# Patient Record
Sex: FEMALE | Race: WHITE | NOT HISPANIC OR LATINO | Employment: FULL TIME | URBAN - METROPOLITAN AREA
[De-identification: names, ages, dates, MRNs, and addresses within clinical notes are randomized per-mention and may not be internally consistent; named-entity substitution may affect disease eponyms.]

---

## 2017-03-08 ENCOUNTER — MYC MEDICAL ADVICE (OUTPATIENT)
Dept: FAMILY MEDICINE | Facility: CLINIC | Age: 47
End: 2017-03-08

## 2017-03-08 DIAGNOSIS — T75.3XXA MOTION SICKNESS, INITIAL ENCOUNTER: Primary | ICD-10-CM

## 2017-03-08 RX ORDER — SCOLOPAMINE TRANSDERMAL SYSTEM 1 MG/1
PATCH, EXTENDED RELEASE TRANSDERMAL
Qty: 2 PATCH | Refills: 0 | Status: SHIPPED | OUTPATIENT
Start: 2017-03-08 | End: 2018-02-17

## 2017-03-08 NOTE — TELEPHONE ENCOUNTER
Please see message below, patient would like this filled at the [facility] in  [place] on [address], pharmacy loaded.   She would like as many patches as possible, if insurance doesn't cover the amount she will pay cash so a PA will not be needed.     Thanks!

## 2017-03-08 NOTE — TELEPHONE ENCOUNTER
Rx was sent. Please inform the patient that it typically takes at least 4 hours for the scopolamine patch to be effective. She may want to consider treatment with Dramamine or bonine instead.

## 2017-03-24 ENCOUNTER — OFFICE VISIT (OUTPATIENT)
Dept: FAMILY MEDICINE | Facility: CLINIC | Age: 47
End: 2017-03-24
Payer: COMMERCIAL

## 2017-03-24 VITALS
OXYGEN SATURATION: 98 % | SYSTOLIC BLOOD PRESSURE: 102 MMHG | WEIGHT: 165 LBS | BODY MASS INDEX: 26.52 KG/M2 | HEIGHT: 66 IN | TEMPERATURE: 98.5 F | HEART RATE: 80 BPM | DIASTOLIC BLOOD PRESSURE: 65 MMHG

## 2017-03-24 DIAGNOSIS — B00.1 COLD SORE: ICD-10-CM

## 2017-03-24 DIAGNOSIS — E55.9 VITAMIN D DEFICIENCY: ICD-10-CM

## 2017-03-24 DIAGNOSIS — Z01.419 ENCOUNTER FOR GYNECOLOGICAL EXAMINATION WITHOUT ABNORMAL FINDING: Primary | ICD-10-CM

## 2017-03-24 LAB
CHOLEST SERPL-MCNC: 246 MG/DL
DEPRECATED CALCIDIOL+CALCIFEROL SERPL-MC: 49 UG/L (ref 20–75)
GLUCOSE SERPL-MCNC: 95 MG/DL (ref 70–99)
HDLC SERPL-MCNC: 64 MG/DL
HGB BLD-MCNC: 13.1 G/DL (ref 11.7–15.7)
LDLC SERPL CALC-MCNC: 159 MG/DL
NONHDLC SERPL-MCNC: 182 MG/DL
TRIGL SERPL-MCNC: 117 MG/DL

## 2017-03-24 PROCEDURE — 80061 LIPID PANEL: CPT | Performed by: NURSE PRACTITIONER

## 2017-03-24 PROCEDURE — 82306 VITAMIN D 25 HYDROXY: CPT | Performed by: NURSE PRACTITIONER

## 2017-03-24 PROCEDURE — 99396 PREV VISIT EST AGE 40-64: CPT | Performed by: NURSE PRACTITIONER

## 2017-03-24 PROCEDURE — 36415 COLL VENOUS BLD VENIPUNCTURE: CPT | Performed by: NURSE PRACTITIONER

## 2017-03-24 PROCEDURE — 85018 HEMOGLOBIN: CPT | Performed by: NURSE PRACTITIONER

## 2017-03-24 PROCEDURE — 82947 ASSAY GLUCOSE BLOOD QUANT: CPT | Performed by: NURSE PRACTITIONER

## 2017-03-24 RX ORDER — ACYCLOVIR 800 MG/1
800 TABLET ORAL 3 TIMES DAILY
Qty: 18 TABLET | Refills: 11 | Status: SHIPPED | OUTPATIENT
Start: 2017-03-24 | End: 2018-05-18

## 2017-03-24 NOTE — PROGRESS NOTES
Taran Preston,    This note is to let you know the results of your recent lab studies.    Your blood count (hemoglobin), blood sugar (glucose) and vitamin D level came back normal.    Your cholesterol levels are mildly elevated. I recommend a diet low in fat and cholesterol as well as regular aerobic activity. I would like to recheck your cholesterol in one year.    Faustina URIBE CNP

## 2017-03-24 NOTE — PROGRESS NOTES
SUBJECTIVE:     CC: Kary Perry is an 46 year old woman who presents for preventive health visit.     Physical   Annual:     Getting at least 3 servings of Calcium per day::  Yes    Bi-annual eye exam::  Yes    Dental care twice a year::  Yes    Sleep apnea or symptoms of sleep apnea::  Daytime drowsiness    Diet::  Regular (no restrictions)    Frequency of exercise::  2-3 days/week    Duration of exercise::  15-30 minutes    Taking medications regularly::  Yes    Medication side effects::  Not applicable    Additional concerns today::  No        Today's PHQ-2 Score:   PHQ-2 ( 1999 Pf[employer]) 3/23/2017   Little interest or pleasure in doing things Not at all   Feeling down, depressed or hopeless Not at all   PHQ-2 Score 0       Abuse: Current or Past(Physical, Sexual or Emotional)- No  Do you feel safe in your environment - Yes    Social History   Substance Use Topics     Smoking status: Never Smoker     Smokeless tobacco: Never Used     Alcohol use Yes      Comment: 3 drinks per week     The patient does not drink >3 drinks per day nor >7 drinks per week.    Recent Labs   Lab Test  04/05/16   0858  03/10/15   0918  02/24/14   0945   CHOL  212*  169  191   HDL  58  63  54   LDL  129*  88  102   TRIG  125  91  175*   CHOLHDLRATIO   --   2.7  3.5   NHDL  154*   --    --        Reviewed orders with patient.  Reviewed health maintenance and updated orders accordingly - Yes    Mammo Decision Support:  Patient under age 50, mutual decision reflected in health maintenance.      Pertinent mammograms are reviewed under the imaging tab.  History of abnormal Pap smear:   Last 3 Pap Results:   PAP (no units)   Date Value   03/10/2015 NIL   02/24/2014 OTHER-NIL, See Result   04/15/2011 NIL     Ablation several years ago.  Last menstrual cycle before her ablation.  Having some hot flashes.  She feels like she is going through menopause.    Cold sores:  Rarely.  Going well with the acyclovir.      Reviewed and updated  as needed this visit by clinical staff  Tobacco  Allergies  Meds  Med Hx  Surg Hx  Fam Hx  Soc Hx        Reviewed and updated as needed this visit by Provider          ROS:  C: NEGATIVE for fever, chills, change in weight  I: NEGATIVE for worrisome rashes, moles or lesions  E: NEGATIVE for vision changes or irritation  ENT: NEGATIVE for ear, mouth and throat problems  R: NEGATIVE for significant cough or SOB  B: NEGATIVE for masses, tenderness or discharge  CV: NEGATIVE for chest pain, palpitations or peripheral edema  GI: NEGATIVE for nausea, abdominal pain, heartburn, or change in bowel habits  : NEGATIVE for unusual urinary or vaginal symptoms.  M: NEGATIVE for significant arthralgias or myalgia  N: NEGATIVE for weakness, dizziness or paresthesias  E: NEGATIVE for temperature intolerance, skin/hair changes  P: NEGATIVE for changes in mood or affect    Problem list, Medication list, Allergies, and Medical/Social/Surgical histories reviewed in [facility] and updated as appropriate.  Labs reviewed in EPIC  BP Readings from Last 3 Encounters:   03/24/17 102/65   12/14/16 104/64   11/08/16 108/58    Wt Readings from Last 3 Encounters:   03/24/17 165 lb (74.8 kg)   12/14/16 166 lb 12.8 oz (75.7 kg)   11/08/16 169 lb (76.7 kg)                  Patient Active Problem List   Diagnosis     Undiagnosed cardiac murmurs     Other specified disorders of temporomandibular joint     Diffuse cystic mastopathy     Herpes simplex virus (HSV) infection     DUB (dysfunctional uterine bleeding)     Sprain and strain of shoulder and upper arm     CARDIOVASCULAR SCREENING; LDL GOAL LESS THAN 160     Dermatitis     Dysphagia     Vitamin D deficiency     Neck sprain     Eczema     History of ovarian cyst     Past Surgical History:   Procedure Laterality Date     C NONSPECIFIC PROCEDURE  1993    Tonsillectomy     HYSTEROSCOPY  10/04    D&C/hysteroscopy/polypectomy     HYSTEROSCOPY  5/08    hysteroscopy/polypectomy     HYSTEROSCOPY   11/2/09    hysteroscopy/D&C/endometrial ablation       Social History   Substance Use Topics     Smoking status: Never Smoker     Smokeless tobacco: Never Used     Alcohol use Yes      Comment: 3 drinks per week     Family History   Problem Relation Age of Onset     Hypertension Mother      Lipids Mother      Obesity Mother      Blood Disease Mother      low epo level     CANCER Mother 67     Lymphoma chemo and radiation     Hyperlipidemia Mother      Other Cancer Mother      Lymphoma     Other - See Comments Mother      anemia     CANCER Maternal Grandmother      Melanoma/Leukemia     OSTEOPOROSIS Maternal Grandmother      C.A.D. Maternal Grandmother      Arthritis Maternal Grandmother      Obesity Maternal Grandmother      Breast Cancer Maternal Grandmother      age 60-70     HEART DISEASE Paternal Grandmother      Obesity Paternal Grandmother      Blood Disease Paternal Grandmother      Used Blood Thinners     CEREBROVASCULAR DISEASE Paternal Grandmother      Hypertension Paternal Grandmother      C.A.D. Paternal Grandmother      Arthritis Paternal Grandmother      Musculoskeletal Disorder Paternal Grandmother      Anti Phospho Lipid Syndrome     CEREBROVASCULAR DISEASE Paternal Grandfather      DIABETES Paternal Grandfather      HEART DISEASE Paternal Grandfather      Heart Attack     Alcohol/Drug Father      methamphetamine     HEART DISEASE Father      passed away from CHF     Substance Abuse Father      Breast Cancer Other          Current Outpatient Prescriptions   Medication Sig Dispense Refill     acyclovir (ZOVIRAX) 800 MG tablet Take 1 tablet (800 mg) by mouth 3 times daily 18 tablet 11     scopolamine (TRANSDERM) 72 hr patch Apply 1 patch to hairless area behind one ear at least 4 hours before travel.  Remove old patch and change every 3 days (72 hours). 2 patch 0     Omega-3 Fatty Acids (OMEGA-3 FISH OIL PO) Take 1,200 mg by mouth daily        Cholecalciferol (VITAMIN D) 2000 UNITS tablet Take 4,000  "Units by mouth daily        Ibuprofen 200 MG capsule Take 200-600 mg by mouth every 4 hours as needed for fever. 100 capsule 3     Calcium 600 MG tablet Take 1 tablet by mouth daily.       VITAMIN B COMPLEX PO TABS 2,000 mg daily       MULTIVITAMIN TABS   OR 2 tabs daily       [DISCONTINUED] acyclovir (ZOVIRAX) 800 MG tablet Take 1 tablet (800 mg) by mouth 3 times daily (Patient taking differently: No sig reported) 18 tablet 11     Allergies   Allergen Reactions     Sulfa Drugs Hives     Recent Labs   Lab Test  04/05/16   0858  03/10/15   0918  02/24/14   0945 12/09/11   07/30/10   1109  02/25/10   0723   A1C  5.7   --    --    --    --    --    --    LDL  129*  88  102  106   < >   --   126   HDL  58  63  54  58   < >   --   53   TRIG  125  91  175*  88   < >   --   84   ALT   --    --    --   19   --    --   18   CR   --   0.63   --   0.68   --    --   0.74   GFRESTIMATED   --   >90  Non  GFR Calc     --   108   --    --   87   GFRESTBLACK   --   >90   GFR Calc     --   125   --    --   >90   POTASSIUM   --   4.0   --   4.5   --    --   3.7   TSH   --    --    --   2.600   --   2.49   --     < > = values in this interval not displayed.      OBJECTIVE:     /65  Pulse 80  Temp 98.5  F (36.9  C) (Oral)  Ht 5' 6\" (1.676 m)  Wt 165 lb (74.8 kg)  SpO2 98%  BMI 26.63 kg/m2  EXAM:  GENERAL: healthy, alert and no distress  EYES: Eyes grossly normal to inspection, PERRL and conjunctivae and sclerae normal  HENT: ear canals and TM's normal, nose and mouth without ulcers or lesions  NECK: no adenopathy, no asymmetry, masses, or scars and thyroid normal to palpation  RESP: lungs clear to auscultation - no rales, rhonchi or wheezes  BREAST: normal without masses, tenderness or nipple discharge and no palpable axillary masses or adenopathy  CV: regular rate and rhythm, normal S1 S2, no S3 or S4, no murmur, click or rub, no peripheral edema and peripheral pulses strong  ABDOMEN: soft, " "nontender, no hepatosplenomegaly, no masses and bowel sounds normal   (female): normal female external genitalia, normal urethral meatus, vaginal mucosa pink, moist, well rugated, and normal cervix/adnexa/uterus without masses or discharge  MS: no gross musculoskeletal defects noted, no edema  SKIN: no suspicious lesions or rashes  NEURO: Normal strength and tone, mentation intact and speech normal  PSYCH: mentation appears normal, affect normal/bright    ASSESSMENT/PLAN:     (Z01.419) Encounter for gynecological examination without abnormal finding  (primary encounter diagnosis)  Comment:   Plan: Lipid panel reflex to direct LDL, Hemoglobin,         Glucose, Vitamin D Deficiency            (E55.9) Vitamin D deficiency  Comment:   Plan: Vitamin D Deficiency            (B00.1) Cold sore  Comment:   Plan: acyclovir (ZOVIRAX) 800 MG tablet            COUNSELING:  Reviewed preventive health counseling, as reflected in patient instructions         reports that she has never smoked. She has never used smokeless tobacco.    Estimated body mass index is 26.63 kg/(m^2) as calculated from the following:    Height as of this encounter: 5' 6\" (1.676 m).    Weight as of this encounter: 165 lb (74.8 kg).   Weight management plan: Discussed healthy diet and exercise guidelines and patient will follow up in 12 months in clinic to re-evaluate.    Counseling Resources:  ATP IV Guidelines  Pooled Cohorts Equation Calculator  Breast Cancer Risk Calculator  FRAX Risk Assessment  ICSI Preventive Guidelines  Dietary Guidelines for Americans, 2010  USDA's MyPlate  ASA Prophylaxis  Lung CA Screening    RONY Ramirez [facility]  Answers for HPI/ROS submitted by the patient on 3/23/2017   Q1: Little interest or pleasure in doing things: 0=Not at all  Q2: Feeling down, depressed or hopeless: 0=Not at all  PHQ-2 Score: 0    "

## 2017-03-24 NOTE — NURSING NOTE
"Chief Complaint   Patient presents with     Physical     Mouth Problem       Initial /65  Pulse 80  Temp 98.5  F (36.9  C) (Oral)  Ht 5' 6\" (1.676 m)  Wt 165 lb (74.8 kg)  SpO2 98%  BMI 26.63 kg/m2 Estimated body mass index is 26.63 kg/(m^2) as calculated from the following:    Height as of this encounter: 5' 6\" (1.676 m).    Weight as of this encounter: 165 lb (74.8 kg).  Medication Reconciliation: complete       Vel Crenshaw MA       "

## 2017-03-24 NOTE — MR AVS SNAPSHOT
After Visit Summary   3/24/2017    Kary Perry    MRN: [id number]           Patient Information     Date Of Birth          1970        Visit Information        Provider Department      3/24/2017 8:00 AM Faustina Wright APRN [facility]        Today's Diagnoses     Encounter for gynecological examination without abnormal finding    -  1    Vitamin D deficiency        Cold sore          Care Instructions      Preventive Health Recommendations  Female Ages 40 to 49    Yearly exam:     See your health care provider every year in order to  1. Review health changes.   2. Discuss preventive care.    3. Review your medicines if your doctor prescribed any.      Get a Pap test every three years (unless you have an abnormal result and your provider advises testing more often).      If you get Pap tests with HPV test, you only need to test every 5 years, unless you have an abnormal result. You do not need a Pap test if your uterus was removed (hysterectomy) and you have not had cancer.      You should be tested each year for STDs (sexually transmitted diseases), if you're at risk.       Ask your doctor if you should have a mammogram.      Have a colonoscopy (test for colon cancer) if someone in your family has had colon cancer or polyps before age 50.       Have a cholesterol test every 5 years.       Have a diabetes test (fasting glucose) after age 45. If you are at risk for diabetes, you should have this test every 3 years.    Shots: Get a flu shot each year. Get a tetanus shot every 10 years.     Nutrition:     Eat at least 5 servings of fruits and vegetables each day.    Eat whole-grain bread, whole-wheat pasta and brown rice instead of white grains and rice.    Talk to your provider about Calcium and Vitamin D.     Lifestyle    Exercise at least 150 minutes a week (an average of 30 minutes a day, 5 days a week). This will help you control your weight and prevent  "disease.    Limit alcohol to one drink per day.    No smoking.     Wear sunscreen to prevent skin cancer.    See your dentist every six months for an exam and cleaning.        Follow-ups after your visit        Who to contact     If you have questions or need follow up information about today's clinic visit or your schedule please contact [facility] directly at [phone].  Normal or non-critical lab and imaging results will be communicated to you by [employer]ars4Lifehart, letter or phone within 4 business days after the clinic has received the results. If you do not hear from us within 7 days, please contact the clinic through Delyt or phone. If you have a critical or abnormal lab result, we will notify you by phone as soon as possible.  Submit refill requests through Laiyaoyao or call your pharmacy and they will forward the refill request to us. Please allow 3 business days for your refill to be completed.          Additional Information About Your Visit        Pillars4[employer] Information     [employer] gives you secure access to your electronic health record. If you see a primary care provider, you can also send messages to your care team and make appointments. If you have questions, please call your primary care clinic.  If you do not have a primary care provider, please call [phone] and they will assist you.        Care EveryWhere ID     This is your Care EveryWhere ID. This could be used by other organizations to access your [facility] medical records  [id number]        Your Vitals Were     Pulse Temperature Height Pulse Oximetry BMI (Body Mass Index)       80 98.5  F (36.9  C) (Oral) 5' 6\" (1.676 m) 98% 26.63 kg/m2        Blood Pressure from Last 3 Encounters:   03/24/17 102/65   12/14/16 104/64   11/08/16 108/58    Weight from Last 3 Encounters:   03/24/17 165 lb (74.8 kg)   12/14/16 166 lb 12.8 oz (75.7 kg)   11/08/16 169 lb (76.7 kg)              We Performed the Following     Glucose     Hemoglobin     " Lipid panel reflex to direct LDL     Vitamin D Deficiency          Today's Medication Changes          These changes are accurate as of: 3/24/17  8:30 AM.  If you have any questions, ask your nurse or doctor.               These medicines have changed or have updated prescriptions.        Dose/Directions    acyclovir 800 MG tablet   Commonly known as:  ZOVIRAX   This may have changed:    - how much to take  - when to take this   Used for:  Cold sore        Dose:  800 mg   Take 1 tablet (800 mg) by mouth 3 times daily   Quantity:  18 tablet   Refills:  11            Where to get your medicines      These medications were sent to [facility] - [place], [place] - [address]  [address], [place] [address]     Phone:  [phone]     acyclovir 800 MG tablet                Primary Care Provider Office Phone # Fax #    Andressa Oneal [phone]       [facility] [address] [facility] [address]  [facility] [address]        Thank you!     Thank you for choosing [facility]  for your care. Our goal is always to provide you with excellent care. Hearing back from our patients is one way we can continue to improve our services. Please take a few minutes to complete the written survey that you may receive in the mail after your visit with us. Thank you!             Your Updated Medication List - Protect others around you: Learn how to safely use, store and throw away your medicines at www.disposemymeds.org.          This list is accurate as of: 3/24/17  8:30 AM.  Always use your most recent med list.                   Brand Name Dispense Instructions for use    acyclovir 800 MG tablet    ZOVIRAX    18 tablet    Take 1 tablet (800 mg) by mouth 3 times daily       calcium 600 MG tablet      Take 1 tablet by mouth daily.       ibuprofen 200 MG capsule     100 capsule    Take 200-600 mg by mouth every 4 hours as needed for fever.       MULTIVITAMIN TABS   OR       2 tabs daily       OMEGA-3 FISH OIL PO      Take 1,200 mg by mouth daily       scopolamine 72 hr patch    TRANSDERM    2 patch    Apply 1 patch to hairless area behind one ear at least 4 hours before travel.  Remove old patch and change every 3 days (72 hours).       vitamin B complex with vitamin C Tabs tablet    STRESS TAB     2,000 mg daily       vitamin D 2000 UNITS tablet      Take 4,000 Units by mouth daily

## 2017-08-11 ENCOUNTER — RADIANT APPOINTMENT (OUTPATIENT)
Dept: MAMMOGRAPHY | Facility: CLINIC | Age: 47
End: 2017-08-11

## 2017-08-11 DIAGNOSIS — Z12.31 VISIT FOR SCREENING MAMMOGRAM: ICD-10-CM

## 2018-01-18 ENCOUNTER — TELEPHONE (OUTPATIENT)
Dept: FAMILY MEDICINE | Facility: CLINIC | Age: 48
End: 2018-01-18

## 2018-01-18 DIAGNOSIS — K62.5 RECTAL BLEEDING: Primary | ICD-10-CM

## 2018-01-18 DIAGNOSIS — K64.9 HEMORRHOIDS: ICD-10-CM

## 2018-01-18 NOTE — TELEPHONE ENCOUNTER
Referral requested to [facility] or Colorectal surgeon to check for probable hemorrhoids and treat. Huddle with provider.     Gave referral numbers to patient to check on first available appt.   Yulia Galvez RN

## 2018-01-25 NOTE — TELEPHONE ENCOUNTER
Reason for Call:  Other Referral    Detailed comments: Kary has indicated that she would prefer the [facility] in [place] but in order for them to schedule an appointment for her, they need the referral sent over to them via fax: [phone] as soon as possible. [facility] asked kary if this was just a consult or should she actually get a colonoscopy? Please advise PCP and patient of situation.    Phone Number Patient can be reached at: Home number on file [phone] (home)    Best Time: anytime    Can we leave a detailed message on this number? YES    Call taken on 1/25/2018 at 11:26 AM by Monica Catalan

## 2018-02-01 ENCOUNTER — TRANSFERRED RECORDS (OUTPATIENT)
Dept: HEALTH INFORMATION MANAGEMENT | Facility: CLINIC | Age: 48
End: 2018-02-01

## 2018-02-06 ENCOUNTER — TRANSFERRED RECORDS (OUTPATIENT)
Dept: HEALTH INFORMATION MANAGEMENT | Facility: CLINIC | Age: 48
End: 2018-02-06

## 2018-02-08 ENCOUNTER — RADIANT APPOINTMENT (OUTPATIENT)
Dept: CT IMAGING | Facility: CLINIC | Age: 48
End: 2018-02-08
Attending: INTERNAL MEDICINE
Payer: COMMERCIAL

## 2018-02-08 ENCOUNTER — TRANSFERRED RECORDS (OUTPATIENT)
Dept: HEALTH INFORMATION MANAGEMENT | Facility: CLINIC | Age: 48
End: 2018-02-08

## 2018-02-08 RX ORDER — IOPAMIDOL 755 MG/ML
101 INJECTION, SOLUTION INTRAVASCULAR ONCE
Status: COMPLETED | OUTPATIENT
Start: 2018-02-08 | End: 2018-02-08

## 2018-02-08 RX ADMIN — IOPAMIDOL 101 ML: 755 INJECTION, SOLUTION INTRAVASCULAR at 17:53

## 2018-02-08 NOTE — DISCHARGE INSTRUCTIONS

## 2018-02-09 ENCOUNTER — TRANSFERRED RECORDS (OUTPATIENT)
Dept: HEALTH INFORMATION MANAGEMENT | Facility: CLINIC | Age: 48
End: 2018-02-09

## 2018-02-17 ENCOUNTER — OFFICE VISIT (OUTPATIENT)
Dept: URGENT CARE | Facility: URGENT CARE | Age: 48
End: 2018-02-17
Payer: COMMERCIAL

## 2018-02-17 VITALS
OXYGEN SATURATION: 100 % | DIASTOLIC BLOOD PRESSURE: 71 MMHG | SYSTOLIC BLOOD PRESSURE: 112 MMHG | BODY MASS INDEX: 26.52 KG/M2 | WEIGHT: 165 LBS | TEMPERATURE: 97.9 F | HEIGHT: 66 IN | HEART RATE: 75 BPM

## 2018-02-17 DIAGNOSIS — T75.3XXA MOTION SICKNESS, INITIAL ENCOUNTER: ICD-10-CM

## 2018-02-17 DIAGNOSIS — B00.9 HERPES SIMPLEX VIRUS INFECTION: Primary | ICD-10-CM

## 2018-02-17 DIAGNOSIS — B00.1 COLD SORE: ICD-10-CM

## 2018-02-17 PROCEDURE — 87529 HSV DNA AMP PROBE: CPT | Mod: 59 | Performed by: INTERNAL MEDICINE

## 2018-02-17 PROCEDURE — 36415 COLL VENOUS BLD VENIPUNCTURE: CPT | Performed by: INTERNAL MEDICINE

## 2018-02-17 PROCEDURE — 87798 DETECT AGENT NOS DNA AMP: CPT | Performed by: INTERNAL MEDICINE

## 2018-02-17 PROCEDURE — 99213 OFFICE O/P EST LOW 20 MIN: CPT | Performed by: INTERNAL MEDICINE

## 2018-02-17 PROCEDURE — 87529 HSV DNA AMP PROBE: CPT | Performed by: INTERNAL MEDICINE

## 2018-02-17 RX ORDER — ACYCLOVIR 800 MG/1
800 TABLET ORAL 3 TIMES DAILY
Qty: 24 TABLET | Refills: 2 | Status: SHIPPED | OUTPATIENT
Start: 2018-02-17 | End: 2018-02-19

## 2018-02-17 RX ORDER — SCOLOPAMINE TRANSDERMAL SYSTEM 1 MG/1
PATCH, EXTENDED RELEASE TRANSDERMAL
Qty: 5 PATCH | Refills: 0 | Status: SHIPPED | OUTPATIENT
Start: 2018-02-17 | End: 2019-10-11

## 2018-02-17 RX ORDER — ACYCLOVIR 50 MG/G
OINTMENT TOPICAL
Qty: 15 G | Refills: 3 | Status: SHIPPED | OUTPATIENT
Start: 2018-02-17 | End: 2018-05-18

## 2018-02-17 ASSESSMENT — ENCOUNTER SYMPTOMS: FEVER: 0

## 2018-02-17 NOTE — NURSING NOTE
"Chief Complaint   Patient presents with     Urgent Care     Shingles     c/o shingles for 1 day       Initial /71  Pulse 75  Temp 97.9  F (36.6  C) (Tympanic)  Ht 5' 6\" (1.676 m)  Wt 165 lb (74.8 kg)  SpO2 100%  BMI 26.63 kg/m2 Estimated body mass index is 26.63 kg/(m^2) as calculated from the following:    Height as of this encounter: 5' 6\" (1.676 m).    Weight as of this encounter: 165 lb (74.8 kg).  Medication Reconciliation: complete   Mare Brar MA      "

## 2018-02-17 NOTE — MR AVS SNAPSHOT
"              After Visit Summary   2/17/2018    Kary Perry    MRN: [id number]           Patient Information     Date Of Birth          1970        Visit Information        Provider Department      2/17/2018 8:15 AM Rika Bassett MD [facility]        Today's Diagnoses     Herpes simplex virus infection    -  1    Cold sore        Motion sickness, initial encounter           Follow-ups after your visit        Who to contact     If you have questions or need follow up information about today's clinic visit or your schedule please contact [facility] directly at [phone].  Normal or non-critical lab and imaging results will be communicated to you by MyChart, letter or phone within 4 business days after the clinic has received the results. If you do not hear from us within 7 days, please contact the clinic through [employer] or phone. If you have a critical or abnormal lab result, we will notify you by phone as soon as possible.  Submit refill requests through [email or web address] or call your pharmacy and they will forward the refill request to us. Please allow 3 business days for your refill to be completed.          Additional Information About Your Visit        MyChart Information     [email or web address] gives you secure access to your electronic health record. If you see a primary care provider, you can also send messages to your care team and make appointments. If you have questions, please call your primary care clinic.  If you do not have a primary care provider, please call [phone] and they will assist you.        Care EveryWhere ID     This is your Care EveryWhere ID. This could be used by other organizations to access your [place] medical records  [id number]        Your Vitals Were     Pulse Temperature Height Pulse Oximetry BMI (Body Mass Index)       75 97.9  F (36.6  C) (Tympanic) 5' 6\" (1.676 m) 100% 26.63 kg/m2        Blood Pressure from Last 3 " Encounters:   02/17/18 112/71   03/24/17 102/65   12/14/16 104/64    Weight from Last 3 Encounters:   02/17/18 165 lb (74.8 kg)   03/24/17 165 lb (74.8 kg)   12/14/16 166 lb 12.8 oz (75.7 kg)              Today, you had the following     No orders found for display         Today's Medication Changes          These changes are accurate as of 2/17/18  8:33 AM.  If you have any questions, ask your nurse or doctor.               These medicines have changed or have updated prescriptions.        Dose/Directions    * acyclovir 800 MG tablet   Commonly known as:  ZOVIRAX   This may have changed:  Another medication with the same name was added. Make sure you understand how and when to take each.   Used for:  Cold sore   Changed by:  Rika Bassett MD        Dose:  800 mg   Take 1 tablet (800 mg) by mouth 3 times daily   Quantity:  18 tablet   Refills:  11       * acyclovir 800 MG tablet   Commonly known as:  ZOVIRAX   This may have changed:  You were already taking a medication with the same name, and this prescription was added. Make sure you understand how and when to take each.   Used for:  Herpes simplex virus infection, Cold sore   Changed by:  Rika Bassett MD        Dose:  800 mg   Take 1 tablet (800 mg) by mouth 3 times daily for 2 days prn   Quantity:  24 tablet   Refills:  2       * Notice:  This list has 2 medication(s) that are the same as other medications prescribed for you. Read the directions carefully, and ask your doctor or other care provider to review them with you.         Where to get your medicines      These medications were sent to [facility] - [place], [place] - [address]  [address], [place] [address]     Phone:  [phone]     acyclovir 800 MG tablet    scopolamine 72 hr patch                Primary Care Provider Office Phone # Fax #    RONY Hernández [facility] [phone]       [address] [place]  [address]        Equal Access to Services     [facility] : Hadii freddy candelaria naziaava Mindy, wazorada luqlaineyha, qanhan bonifaciobryonlida spear, whitney covington. So [facility] [phone].    ATENCIÓN: Si habla español, tiene a domingo disposición servicios gratuitos de asistencia lingüística. Elier al [phone].    We comply with applicable federal civil rights laws and [place] laws. We do not discriminate on the basis of race, color, national origin, age, disability, sex, sexual orientation, or gender identity.            Thank you!     Thank you for choosing [facility]  for your care. Our goal is always to provide you with excellent care. Hearing back from our patients is one way we can continue to improve our services. Please take a few minutes to complete the written survey that you may receive in the mail after your visit with us. Thank you!             Your Updated Medication List - Protect others around you: Learn how to safely use, store and throw away your medicines at www.disposemymeds.org.          This list is accurate as of 2/17/18  8:33 AM.  Always use your most recent med list.                   Brand Name Dispense Instructions for use Diagnosis    * acyclovir 800 MG tablet    ZOVIRAX    18 tablet    Take 1 tablet (800 mg) by mouth 3 times daily    Cold sore       * acyclovir 800 MG tablet    ZOVIRAX    24 tablet    Take 1 tablet (800 mg) by mouth 3 times daily for 2 days prn    Herpes simplex virus infection, Cold sore       calcium 600 MG tablet      Take 1 tablet by mouth daily.        ibuprofen 200 MG capsule     100 capsule    Take 200-600 mg by mouth every 4 hours as needed for fever.    Neck pain       MULTIVITAMIN TABS   OR      2 tabs daily        OMEGA-3 FISH OIL PO      Take 1,200 mg by mouth daily        scopolamine 72 hr patch    TRANSDERM    5 patch    Apply 1 patch to hairless area behind one ear at least 4 hours before travel.  Remove old patch and  change every 3 days (72 hours).    Motion sickness, initial encounter       vitamin B complex with vitamin C Tabs tablet    STRESS TAB     2,000 mg daily        vitamin D 2000 UNITS tablet      Take 4,000 Units by mouth daily        * Notice:  This list has 2 medication(s) that are the same as other medications prescribed for you. Read the directions carefully, and ask your doctor or other care provider to review them with you.

## 2018-02-18 LAB
HSV1 DNA SPEC QL NAA+PROBE: NEGATIVE
HSV2 DNA SPEC QL NAA+PROBE: NEGATIVE
SPECIMEN SOURCE: NORMAL

## 2018-02-19 ENCOUNTER — MYC MEDICAL ADVICE (OUTPATIENT)
Dept: FAMILY MEDICINE | Facility: CLINIC | Age: 48
End: 2018-02-19

## 2018-02-19 LAB
SPECIMEN TYPE: ABNORMAL
VARICELLA ZOSTER DNA PCR COMMENT: ABNORMAL
VZV DNA SPEC QL NAA+PROBE: ABNORMAL

## 2018-02-19 NOTE — TELEPHONE ENCOUNTER
Will route this to Dr Phillip who commented on her test results earlier today. I asked pt to look at her result note .    Chary Reyes RN, BSN

## 2018-02-19 NOTE — PROGRESS NOTES
This confirms that the Herpes type virus is the kind that causes chicken pox and Shingles. Hope that is helpful

## 2018-02-20 ENCOUNTER — TELEPHONE (OUTPATIENT)
Dept: FAMILY MEDICINE | Facility: CLINIC | Age: 48
End: 2018-02-20

## 2018-02-20 DIAGNOSIS — B02.9 HERPES ZOSTER WITHOUT COMPLICATION: Primary | ICD-10-CM

## 2018-02-20 RX ORDER — ACYCLOVIR 800 MG/1
800 TABLET ORAL
Qty: 35 TABLET | Refills: 0 | Status: SHIPPED | OUTPATIENT
Start: 2018-02-20 | End: 2018-05-18

## 2018-02-20 NOTE — TELEPHONE ENCOUNTER
Patient seen in urgent care over the weekend, given refill of her cold sore treatment before shingles diagnosis was verified. Out of medication, needs rx for shingles treatment unable to refill cold sore rx because it is too soon through insurance.  Orin Garcia [facility]  [phone]

## 2018-02-20 NOTE — TELEPHONE ENCOUNTER
Karloina,    Can you advise pt on her medication dosing now that shingles is verified.     Looks like when UC provider gave her the aclyclovir the dosing was for cold sore but now testing shows it is shingles.     Thanks,    Chary Reyes, RN, BSN

## 2018-02-20 NOTE — TELEPHONE ENCOUNTER
Patient is calling and wants to speak with someone regarding my chart messages, said's her my chart messages are not being understood with what she has been getting for a response back  She has now been confirmed that she does have shingles yesterday and the medication given she wants to know if she is to still take as was given or is there going to be a change in the dosage  And yes you can leave message  [facility] pharmacy is the pharmacy she would be using

## 2018-05-04 ENCOUNTER — OFFICE VISIT (OUTPATIENT)
Dept: DERMATOLOGY | Facility: CLINIC | Age: 48
End: 2018-05-04
Payer: COMMERCIAL

## 2018-05-04 VITALS — HEART RATE: 70 BPM | SYSTOLIC BLOOD PRESSURE: 100 MMHG | OXYGEN SATURATION: 96 % | DIASTOLIC BLOOD PRESSURE: 67 MMHG

## 2018-05-04 DIAGNOSIS — D48.5 NEOPLASM OF UNCERTAIN BEHAVIOR OF SKIN: Primary | ICD-10-CM

## 2018-05-04 DIAGNOSIS — D18.00 ANGIOMA: ICD-10-CM

## 2018-05-04 DIAGNOSIS — L81.4 LENTIGO: ICD-10-CM

## 2018-05-04 DIAGNOSIS — D22.9 NEVUS: ICD-10-CM

## 2018-05-04 DIAGNOSIS — L82.1 SEBORRHEIC KERATOSIS: ICD-10-CM

## 2018-05-04 PROCEDURE — 11100 ZZHC BIOPSY SKIN/SUBQ/MUC MEM, SINGLE LESION: CPT | Mod: 59 | Performed by: PHYSICIAN ASSISTANT

## 2018-05-04 PROCEDURE — 87624 HPV HI-RISK TYP POOLED RSLT: CPT | Performed by: PHYSICIAN ASSISTANT

## 2018-05-04 PROCEDURE — 88305 TISSUE EXAM BY PATHOLOGIST: CPT | Mod: TC | Performed by: PHYSICIAN ASSISTANT

## 2018-05-04 PROCEDURE — 99204 OFFICE O/P NEW MOD 45 MIN: CPT | Mod: 25 | Performed by: PHYSICIAN ASSISTANT

## 2018-05-04 PROCEDURE — 00000159 ZZHCL STATISTIC H-SEND OUTS PREP: Performed by: PHYSICIAN ASSISTANT

## 2018-05-04 NOTE — PATIENT INSTRUCTIONS
Topical cream on affected area once daily       Wound Care Instructions     FOR SUPERFICIAL WOUNDS     [facility] [phone]                 AFTER 24 HOURS YOU SHOULD REMOVE THE BANDAGE AND BEGIN DAILY DRESSING CHANGES AS FOLLOWS:     1) Remove Dressing.     2) Clean and dry the area with tap water using a Q-tip or sterile gauze pad.     3) Apply Vaseline, Aquaphor, Polysporin ointment or Bacitracin ointment over entire wound.  Do NOT use Neosporin ointment.     4) Cover the wound with a band-aid, or a sterile non-stick gauze pad and micropore paper tape      REPEAT THESE INSTRUCTIONS AT LEAST ONCE A DAY UNTIL THE WOUND HAS COMPLETELY HEALED.    It is an old wives tale that a wound heals better when it is exposed to air and allowed to dry out. The wound will heal faster with a better cosmetic result if it is kept moist with ointment and covered with a bandage.    **Do not let the wound dry out.**      Supplies Needed:      *Cotton tipped applicators (Q-tips)    *Polysporin Ointment or Bacitracin Ointment (NOT NEOSPORIN)    *Band-aids or non-stick gauze pads and micropore paper tape.      PATIENT INFORMATION:    During the healing process you will notice a number of changes. All wounds develop a small halo of redness surrounding the wound.  This means healing is occurring. Severe itching with extensive redness usually indicates sensitivity to the ointment or bandage tape used to dress the wound.  You should call our office if this develops.      Swelling  and/or discoloration around your surgical site is common, particularly when performed around the eye.    All wounds normally drain.  The larger the wound the more drainage there will be.  After 7-10 days, you will notice the wound beginning to shrink and new skin will begin to grow.  The wound is healed when you can see skin has formed over the entire area.  A healed wound has a healthy, shiny look to the surface and is red to dark pink in color to  normalize.  Wounds may take approximately 4-6 weeks to heal.  Larger wounds may take 6-8 weeks.  After the wound is healed you may discontinue dressing changes.    You may experience a sensation of tightness as your wound heals. This is normal and will gradually subside.    Your healed wound may be sensitive to temperature changes. This sensitivity improves with time, but if you re having a lot of discomfort, try to avoid temperature extremes.    Patients frequently experience itching after their wound appears to have healed because of the continue healing under the skin.  Plain Vaseline will help relieve the itching.        POSSIBLE COMPLICATIONS    BLEEDIN. Leave the bandage in place.  2. Use tightly rolled up gauze or a cloth to apply direct pressure over the bandage for 30  minutes.  3. Reapply pressure for an additional 30 minutes if necessary  4. Use additional gauze and tape to maintain pressure once the bleeding has stopped.

## 2018-05-04 NOTE — PROGRESS NOTES
HPI:   Kary Perry is a 47 year old female who presents for Full skin cancer screening.  chief complaint  Last Skin Exam: none      1st Baseline: YES  Personal HX of Skin Cancer: no   Personal HX of Malignant Melanoma: no   Family HX of Skin Cancer / Malignant Melanoma: mother BCC  Personal HX of Atypical Moles:   no  Risk factors: sun exposure, blistering sunburn and tanning bed use in the past.   New / Changing lesions:yes, scalp and right of face   Social History: Here today with wife, Estrellita  On review of systems, there are no further skin complaints, patient is feeling otherwise well.  See patient intake sheet.  ROS of the following were done and are negative: Constitutional, Eyes, Ears, Nose,   Mouth, Throat, Cardiovascular, Respiratory, GI, Genitourinary, Musculoskeletal,   Psychiatric, Endocrine, Allergic/Immunologic.    This document serves as a record of the services and decisions personally performed and made by Marley Calle, MS, PA-C. It was created on her behalf by Elisabeth Snell, a trained medical scribe. The creation of this document is based on the provider's statements to the medical scribe.  Elisabeth Snell 3:17 PM May 4, 2018    PHYSICAL EXAM:   /67  Pulse 70  SpO2 96%  Breastfeeding? No  Skin exam performed as follows: Type 2 skin. Mood appropriate  Alert and Oriented X 3. Well developed, well nourished in no distress.  General appearance: Normal  Head including face: Normal  Eyes: conjunctiva and lids: Normal  Mouth: Lips, teeth, gums: Normal  Neck: Normal  Chest-breast/axillae: Normal  Back: Normal  Spleen and liver: Normal  Cardiovascular: Exam of peripheral vascular system by observation for swelling, varicosities, edema: Normal  Genitalia: groin, buttocks: Normal  Extremities: digits/nails (clubbing): Normal  Eccrine and Apocrine glands: Normal  Right upper extremity: Normal  Left upper extremity: Normal  Right lower extremity: Normal  Left lower  extremity: Normal  Skin: Scalp and body hair: See below    Pt deferred exam of breasts, groin, buttocks: No    Other physical findings:  1. Multiple pigmented macules on extremities and trunk  2. Multiple pigmented macules on face, trunk and extremities  3. Multiple vascular papules on trunk, arms and legs  4. Multiple scattered keratotic plaques  5. 10 mm inflamed tan papule on perianal region       Except as noted above, no other signs of skin cancer or melanoma.     ASSESSMENT/PLAN:   Benign Full skin cancer screening today. . Patient with history of none  Advised on monthly self exams and 1 year  Patient Education: Appropriate brochures given.    Multiple benign appearing nevi on arms, legs and trunk. Discussed ABCDEs of melanoma and sunscreen.   Multiple lentigos on arms, legs and trunk. Advised benign, no treatment needed.  Multiple scattered angiomas. Advised benign, no treatment needed.   Seborrheic keratosis on arms, legs and trunk. Advised benign, no treatment needed.  Inflamed acrochordon vs nevus on the perianal area, r/o atypia - advised. Shave bx in typical fashion .  Area cleaned with betadyne and anesthetized with 1% lidocaine with epi .Dermablade used to remove the lesion and sent to pathology. Bleeding was cauterized. Pt tolerated procedure well.  Corn vs verruca on right planter foot - advised. Try OTC but will call if no improvement.        Follow-up: yearly FSE/PRN sooner    1.) Patient was asked about new and changing moles. YES  2.) Patient received a complete physical skin examination: YES  3.) Patient was counseled to perform a monthly self skin examination: YES  Scribed By:Elisabeth Snell Medical Scribe    The information in this document, created by the medical scribe for me, accurately reflects the services I personally performed and the decisions made by me. I have reviewed and approved this document for accuracy prior to leaving the patient care area.  May 4, 2018 3:37  PM    Marley Calle, MS, PAYESI

## 2018-05-04 NOTE — MR AVS SNAPSHOT
After Visit Summary   5/4/2018    Kary Perry    MRN: [id number]           Patient Information     Date Of Birth          1970        Visit Information        Provider Department      5/4/2018 3:00 PM Marley Calle PA-C [facility]        Today's Diagnoses     Neoplasm of uncertain behavior of skin    -  1    Lentigo        Angioma        Seborrheic keratosis        Nevus          Care Instructions    Topical cream on affected area once daily       Wound Care Instructions     FOR SUPERFICIAL WOUNDS     [facility] [phone]                 AFTER 24 HOURS YOU SHOULD REMOVE THE BANDAGE AND BEGIN DAILY DRESSING CHANGES AS FOLLOWS:     1) Remove Dressing.     2) Clean and dry the area with tap water using a Q-tip or sterile gauze pad.     3) Apply Vaseline, Aquaphor, Polysporin ointment or Bacitracin ointment over entire wound.  Do NOT use Neosporin ointment.     4) Cover the wound with a band-aid, or a sterile non-stick gauze pad and micropore paper tape      REPEAT THESE INSTRUCTIONS AT LEAST ONCE A DAY UNTIL THE WOUND HAS COMPLETELY HEALED.    It is an old wives tale that a wound heals better when it is exposed to air and allowed to dry out. The wound will heal faster with a better cosmetic result if it is kept moist with ointment and covered with a bandage.    **Do not let the wound dry out.**      Supplies Needed:      *Cotton tipped applicators (Q-tips)    *Polysporin Ointment or Bacitracin Ointment (NOT NEOSPORIN)    *Band-aids or non-stick gauze pads and micropore paper tape.      PATIENT INFORMATION:    During the healing process you will notice a number of changes. All wounds develop a small halo of redness surrounding the wound.  This means healing is occurring. Severe itching with extensive redness usually indicates sensitivity to the ointment or bandage tape used to dress the wound.  You should call our office if this develops.       Swelling  and/or discoloration around your surgical site is common, particularly when performed around the eye.    All wounds normally drain.  The larger the wound the more drainage there will be.  After 7-10 days, you will notice the wound beginning to shrink and new skin will begin to grow.  The wound is healed when you can see skin has formed over the entire area.  A healed wound has a healthy, shiny look to the surface and is red to dark pink in color to normalize.  Wounds may take approximately 4-6 weeks to heal.  Larger wounds may take 6-8 weeks.  After the wound is healed you may discontinue dressing changes.    You may experience a sensation of tightness as your wound heals. This is normal and will gradually subside.    Your healed wound may be sensitive to temperature changes. This sensitivity improves with time, but if you re having a lot of discomfort, try to avoid temperature extremes.    Patients frequently experience itching after their wound appears to have healed because of the continue healing under the skin.  Plain Vaseline will help relieve the itching.        POSSIBLE COMPLICATIONS    BLEEDIN. Leave the bandage in place.  2. Use tightly rolled up gauze or a cloth to apply direct pressure over the bandage for 30  minutes.  3. Reapply pressure for an additional 30 minutes if necessary  4. Use additional gauze and tape to maintain pressure once the bleeding has stopped.            Follow-ups after your visit        Your next 10 appointments already scheduled     May 18, 2018  8:00 AM AYANNA Jarrett Physical Adult with RONY Hernández CNP   [facility] ([facility]    [address] [place] [id number] [phone]              Who to contact     If you have questions or need follow up information about today's clinic visit or your schedule please contact [facility] directly at [phone].  Normal or  non-critical lab and imaging results will be communicated to you by MyChart, letter or phone within 4 business days after the clinic has received the results. If you do not hear from us within 7 days, please contact the clinic through [employer] or phone. If you have a critical or abnormal lab result, we will notify you by phone as soon as possible.  Submit refill requests through [employer] or call your pharmacy and they will forward the refill request to us. Please allow 3 business days for your refill to be completed.          Additional Information About Your Visit        [employer] Information     [employer] gives you secure access to your electronic health record. If you see a primary care provider, you can also send messages to your care team and make appointments. If you have questions, please call your primary care clinic.  If you do not have a primary care provider, please call [phone] and they will assist you.        Care EveryWhere ID     This is your Care EveryWhere ID. This could be used by other organizations to access your [place] medical records  [id number]        Your Vitals Were     Pulse Pulse Oximetry Breastfeeding?             70 96% No          Blood Pressure from Last 3 Encounters:   05/04/18 100/67   02/17/18 112/71   03/24/17 102/65    Weight from Last 3 Encounters:   02/17/18 74.8 kg (165 lb)   03/24/17 74.8 kg (165 lb)   12/14/16 75.7 kg (166 lb 12.8 oz)              Today, you had the following     No orders found for display       Primary Care Provider Office Phone # Fax #    RONY Hernández [facility] [phone] [address] [place] [address]        Equal Access to Services     [facility]: Hadii freddy dickerson Sofawn, waaxda luqadaha, qaybta kaalmada rainer, whitney covington. So [facility] [phone].    ATENCIÓN: Si habla español, tiene a domingo disposición servicios gratuitos de asistencia lingüística. Llame al  [phone].    We comply with applicable federal civil rights laws and [place] laws. We do not discriminate on the basis of race, color, national origin, age, disability, sex, sexual orientation, or gender identity.            Thank you!     Thank you for choosing [facility]  for your care. Our goal is always to provide you with excellent care. Hearing back from our patients is one way we can continue to improve our services. Please take a few minutes to complete the written survey that you may receive in the mail after your visit with us. Thank you!             Your Updated Medication List - Protect others around you: Learn how to safely use, store and throw away your medicines at www.disposemymeds.org.          This list is accurate as of 5/4/18  3:42 PM.  Always use your most recent med list.                   Brand Name Dispense Instructions for use Diagnosis    acyclovir 5 % ointment    ZOVIRAX    15 g    Apply topically 6 times daily    Herpes simplex virus infection, Cold sore       * acyclovir 800 MG tablet    ZOVIRAX    18 tablet    Take 1 tablet (800 mg) by mouth 3 times daily    Cold sore       * acyclovir 800 MG tablet    ZOVIRAX    35 tablet    Take 1 tablet (800 mg) by mouth 5 times daily    Herpes zoster without complication       calcium 600 MG tablet      Take 1 tablet by mouth daily.        ibuprofen 200 MG capsule     100 capsule    Take 200-600 mg by mouth every 4 hours as needed for fever.    Neck pain       MULTIVITAMIN TABS   OR      2 tabs daily        OMEGA-3 FISH OIL PO      Take 1,200 mg by mouth daily        scopolamine 72 hr patch    TRANSDERM    5 patch    Apply 1 patch to hairless area behind one ear at least 4 hours before travel.  Remove old patch and change every 3 days (72 hours).    Motion sickness, initial encounter       vitamin B complex with vitamin C Tabs tablet    STRESS TAB     2,000 mg daily        vitamin D 2000 units tablet      Take 4,000  Units by mouth daily        * Notice:  This list has 2 medication(s) that are the same as other medications prescribed for you. Read the directions carefully, and ask your doctor or other care provider to review them with you.

## 2018-05-04 NOTE — LETTER
5/4/2018         RE: Kary Perry  [address]  [facility] [address]        Dear Colleague,    Thank you for referring your patient, Kary Perry, to the [facility]. Please see a copy of my visit note below.    HPI:   Kary Perry is a 47 year old female who presents for Full skin cancer screening.  chief complaint  Last Skin Exam: none      1st Baseline: YES  Personal HX of Skin Cancer: no   Personal HX of Malignant Melanoma: no   Family HX of Skin Cancer / Malignant Melanoma: mother BCC  Personal HX of Atypical Moles:   no  Risk factors: sun exposure, blistering sunburn and tanning bed use in the past.   New / Changing lesions:yes, scalp and right of face   Social History: Here today with wifeEstrellita  On review of systems, there are no further skin complaints, patient is feeling otherwise well.  See patient intake sheet.  ROS of the following were done and are negative: Constitutional, Eyes, Ears, Nose,   Mouth, Throat, Cardiovascular, Respiratory, GI, Genitourinary, Musculoskeletal,   Psychiatric, Endocrine, Allergic/Immunologic.    This document serves as a record of the services and decisions personally performed and made by Marley Calle, MS, PA-C. It was created on her behalf by Elisabeth Snell, a trained medical scribe. The creation of this document is based on the provider's statements to the medical scribe.  Elisabeth Snell 3:17 PM May 4, 2018    PHYSICAL EXAM:   /67  Pulse 70  SpO2 96%  Breastfeeding? No  Skin exam performed as follows: Type 2 skin. Mood appropriate  Alert and Oriented X 3. Well developed, well nourished in no distress.  General appearance: Normal  Head including face: Normal  Eyes: conjunctiva and lids: Normal  Mouth: Lips, teeth, gums: Normal  Neck: Normal  Chest-breast/axillae: Normal  Back: Normal  Spleen and liver: Normal  Cardiovascular: Exam of peripheral vascular system by  observation for swelling, varicosities, edema: Normal  Genitalia: groin, buttocks: Normal  Extremities: digits/nails (clubbing): Normal  Eccrine and Apocrine glands: Normal  Right upper extremity: Normal  Left upper extremity: Normal  Right lower extremity: Normal  Left lower extremity: Normal  Skin: Scalp and body hair: See below    Pt deferred exam of breasts, groin, buttocks: No    Other physical findings:  1. Multiple pigmented macules on extremities and trunk  2. Multiple pigmented macules on face, trunk and extremities  3. Multiple vascular papules on trunk, arms and legs  4. Multiple scattered keratotic plaques  5. 10 mm inflamed tan papule on perianal region       Except as noted above, no other signs of skin cancer or melanoma.     ASSESSMENT/PLAN:   Benign Full skin cancer screening today. . Patient with history of none  Advised on monthly self exams and 1 year  Patient Education: Appropriate brochures given.    Multiple benign appearing nevi on arms, legs and trunk. Discussed ABCDEs of melanoma and sunscreen.   Multiple lentigos on arms, legs and trunk. Advised benign, no treatment needed.  Multiple scattered angiomas. Advised benign, no treatment needed.   Seborrheic keratosis on arms, legs and trunk. Advised benign, no treatment needed.  Inflamed acrochordon vs nevus on the perianal area, r/o atypia - advised. Shave bx in typical fashion .  Area cleaned with betadyne and anesthetized with 1% lidocaine with epi .Dermablade used to remove the lesion and sent to pathology. Bleeding was cauterized. Pt tolerated procedure well.  Corn vs verruca on right planter foot - advised. Try OTC but will call if no improvement.        Follow-up: yearly FSE/PRN sooner    1.) Patient was asked about new and changing moles. YES  2.) Patient received a complete physical skin examination: YES  3.) Patient was counseled to perform a monthly self skin examination: YES  Scribed By:Elisabeth Snell, Medical  Scribe    The information in this document, created by the medical scribe for me, accurately reflects the services I personally performed and the decisions made by me. I have reviewed and approved this document for accuracy prior to leaving the patient care area.  May 4, 2018 3:37 PM    Marley Calle MS, PASimiC        Again, thank you for allowing me to participate in the care of your patient.        Sincerely,        Marley Calle PA-C

## 2018-05-13 ENCOUNTER — HEALTH MAINTENANCE LETTER (OUTPATIENT)
Age: 48
End: 2018-05-13

## 2018-05-14 LAB — COPATH REPORT: NORMAL

## 2018-05-15 ENCOUNTER — TELEPHONE (OUTPATIENT)
Dept: DERMATOLOGY | Facility: CLINIC | Age: 48
End: 2018-05-15

## 2018-05-15 NOTE — TELEPHONE ENCOUNTER
Called and LM for patient to call back in regards to pathology results and findings.  MG Cosme-BSN  [facility]  [phone]

## 2018-05-15 NOTE — TELEPHONE ENCOUNTER
Patient called back. Educated patient on pathology findings and further testing needed in order to verify diganosis. Patient stated she would like to go ahead with further studies in order to determine if lesion has any HPV strains. Patient voiced understanding.  MG Cosme-BSN  [facility]  [phone]

## 2018-05-15 NOTE — TELEPHONE ENCOUNTER
Notes Recorded by Marley Calle PA-C on 5/14/2018 at 8:27 PM  The area removed on her buttocks was likely a normal skin tag, but the pathologist can not rule out a wart. There are further studies that can be performed to determine if the lesion has any HPV strains - would she like to do this?

## 2018-05-15 NOTE — TELEPHONE ENCOUNTER
Called and LM for patient to call back in regards to pathology results and findings.  MG Cosme-BSN  [facility]  [phone]

## 2018-05-17 NOTE — TELEPHONE ENCOUNTER
Patient called back, let her know tissue is out for further testing and that I will f/u with her in 7-10 days. Patient voice understanding.  MG Cosme-BSN  [facility]  [phone]

## 2018-05-17 NOTE — TELEPHONE ENCOUNTER
Called and LM for patient to call back, regarding further testing with pathologist.  Ba RN-BSN  [facility]  [phone]

## 2018-05-18 ENCOUNTER — OFFICE VISIT (OUTPATIENT)
Dept: FAMILY MEDICINE | Facility: CLINIC | Age: 48
End: 2018-05-18
Payer: COMMERCIAL

## 2018-05-18 VITALS
SYSTOLIC BLOOD PRESSURE: 92 MMHG | RESPIRATION RATE: 14 BRPM | DIASTOLIC BLOOD PRESSURE: 68 MMHG | TEMPERATURE: 98.1 F | BODY MASS INDEX: 26.68 KG/M2 | WEIGHT: 166 LBS | HEART RATE: 66 BPM | HEIGHT: 66 IN

## 2018-05-18 DIAGNOSIS — N62 LARGE BREASTS: ICD-10-CM

## 2018-05-18 DIAGNOSIS — B00.9 HERPES SIMPLEX VIRUS INFECTION: ICD-10-CM

## 2018-05-18 DIAGNOSIS — Z01.419 ENCOUNTER FOR GYNECOLOGICAL EXAMINATION WITHOUT ABNORMAL FINDING: Primary | ICD-10-CM

## 2018-05-18 DIAGNOSIS — B00.1 COLD SORE: ICD-10-CM

## 2018-05-18 LAB
CHOLEST SERPL-MCNC: 227 MG/DL
DEPRECATED CALCIDIOL+CALCIFEROL SERPL-MC: 50 UG/L (ref 20–75)
GLUCOSE SERPL-MCNC: 80 MG/DL (ref 70–99)
HDLC SERPL-MCNC: 64 MG/DL
HGB BLD-MCNC: 12.9 G/DL (ref 11.7–15.7)
LDLC SERPL CALC-MCNC: 136 MG/DL
NONHDLC SERPL-MCNC: 163 MG/DL
TRIGL SERPL-MCNC: 134 MG/DL

## 2018-05-18 PROCEDURE — 87624 HPV HI-RISK TYP POOLED RSLT: CPT | Performed by: NURSE PRACTITIONER

## 2018-05-18 PROCEDURE — G0145 SCR C/V CYTO,THINLAYER,RESCR: HCPCS | Performed by: NURSE PRACTITIONER

## 2018-05-18 PROCEDURE — 82947 ASSAY GLUCOSE BLOOD QUANT: CPT | Performed by: NURSE PRACTITIONER

## 2018-05-18 PROCEDURE — 82306 VITAMIN D 25 HYDROXY: CPT | Performed by: NURSE PRACTITIONER

## 2018-05-18 PROCEDURE — 80061 LIPID PANEL: CPT | Performed by: NURSE PRACTITIONER

## 2018-05-18 PROCEDURE — 85018 HEMOGLOBIN: CPT | Performed by: NURSE PRACTITIONER

## 2018-05-18 PROCEDURE — 99396 PREV VISIT EST AGE 40-64: CPT | Performed by: NURSE PRACTITIONER

## 2018-05-18 PROCEDURE — 36415 COLL VENOUS BLD VENIPUNCTURE: CPT | Performed by: NURSE PRACTITIONER

## 2018-05-18 RX ORDER — ACYCLOVIR 800 MG/1
800 TABLET ORAL 3 TIMES DAILY
Qty: 6 TABLET | Refills: 11 | Status: SHIPPED | OUTPATIENT
Start: 2018-05-18 | End: 2019-09-17

## 2018-05-18 NOTE — PROGRESS NOTES
SUBJECTIVE:   CC: Kary Perry is an 47 year old woman who presents for preventive health visit.     Physical   Annual:     Getting at least 3 servings of Calcium per day::  Yes    Bi-annual eye exam::  Yes    Dental care twice a year::  Yes    Sleep apnea or symptoms of sleep apnea::  None    Diet::  Regular (no restrictions) and Breakfast skipped    Frequency of exercise::  2-3 days/week    Duration of exercise::  15-30 minutes    Taking medications regularly::  Not Applicable                    Today's PHQ-2 Score:   PHQ-2 ( 1999 Pfizer) 5/18/2018   Q1: Little interest or pleasure in doing things 0   Q2: Feeling down, depressed or hopeless 0   PHQ-2 Score 0   Q1: Little interest or pleasure in doing things Not at all   Q2: Feeling down, depressed or hopeless Not at all   PHQ-2 Score 0       Abuse: Current or Past(Physical, Sexual or Emotional)- No  Do you feel safe in your environment - Yes    Social History   Substance Use Topics     Smoking status: Never Smoker     Smokeless tobacco: Never Used     Alcohol use Yes      Comment: 3 drinks per week     Alcohol Use 5/18/2018   If you drink alcohol do you typically have greater than 3 drinks per day OR greater than 7 drinks per week? No   No flowsheet data found.    Reviewed orders with patient.  Reviewed health maintenance and updated orders accordingly - Yes  Labs reviewed in EPIC  BP Readings from Last 3 Encounters:   05/18/18 92/68   05/04/18 100/67   02/17/18 112/71    Wt Readings from Last 3 Encounters:   05/18/18 166 lb (75.3 kg)   02/17/18 165 lb (74.8 kg)   03/24/17 165 lb (74.8 kg)                  Patient Active Problem List   Diagnosis     Undiagnosed cardiac murmurs     Other specified disorders of temporomandibular joint     Diffuse cystic mastopathy     Herpes simplex virus (HSV) infection     DUB (dysfunctional uterine bleeding)     Sprain and strain of shoulder and upper arm     CARDIOVASCULAR SCREENING; LDL GOAL LESS THAN 160      Dermatitis     Dysphagia     Vitamin D deficiency     Neck sprain     Eczema     History of ovarian cyst     Past Surgical History:   Procedure Laterality Date     C NONSPECIFIC PROCEDURE  1993    Tonsillectomy     HYSTEROSCOPY  10/04    D&C/hysteroscopy/polypectomy     HYSTEROSCOPY  5/08    hysteroscopy/polypectomy     HYSTEROSCOPY  11/2/09    hysteroscopy/D&C/endometrial ablation       Social History   Substance Use Topics     Smoking status: Never Smoker     Smokeless tobacco: Never Used     Alcohol use Yes      Comment: 3 drinks per week     Family History   Problem Relation Age of Onset     Hypertension Mother      Lipids Mother      Obesity Mother      Blood Disease Mother      low epo level     CANCER Mother 67     Lymphoma chemo and radiation     Hyperlipidemia Mother      Other Cancer Mother      Lymphoma     Other - See Comments Mother      anemia     Skin Cancer Mother      CANCER Maternal Grandmother      Melanoma/Leukemia     OSTEOPOROSIS Maternal Grandmother      C.A.D. Maternal Grandmother      Arthritis Maternal Grandmother      Obesity Maternal Grandmother      Breast Cancer Maternal Grandmother      age 60-70     Skin Cancer Maternal Grandmother      HEART DISEASE Paternal Grandmother      Obesity Paternal Grandmother      Blood Disease Paternal Grandmother      Used Blood Thinners     CEREBROVASCULAR DISEASE Paternal Grandmother      Hypertension Paternal Grandmother      C.A.D. Paternal Grandmother      Arthritis Paternal Grandmother      Musculoskeletal Disorder Paternal Grandmother      Anti Phospho Lipid Syndrome     CEREBROVASCULAR DISEASE Paternal Grandfather      DIABETES Paternal Grandfather      HEART DISEASE Paternal Grandfather      Heart Attack     Alcohol/Drug Father      methamphetamine     HEART DISEASE Father      passed away from CHF     Substance Abuse Father      Breast Cancer Other          Current Outpatient Prescriptions   Medication Sig Dispense Refill     Calcium 600 MG  tablet Take 1 tablet by mouth daily.       Cholecalciferol (VITAMIN D) 2000 UNITS tablet Take 4,000 Units by mouth daily        Ibuprofen 200 MG capsule Take 200-600 mg by mouth every 4 hours as needed for fever. 100 capsule 3     MULTIVITAMIN TABS   OR 2 tabs daily       Omega-3 Fatty Acids (OMEGA-3 FISH OIL PO) Take 1,200 mg by mouth daily        scopolamine (TRANSDERM) 72 hr patch Apply 1 patch to hairless area behind one ear at least 4 hours before travel.  Remove old patch and change every 3 days (72 hours). 5 patch 0     VITAMIN B COMPLEX PO TABS 2,000 mg daily       Allergies   Allergen Reactions     Sulfa Drugs Hives     Recent Labs   Lab Test  03/24/17   0839  04/05/16   0858  03/10/15   0918  12/09/11   07/30/10   1109   A1C   --   5.7   --    --    --    --    --    LDL  159*  129*  88   < >  106   < >   --    HDL  64  58  63   < >  58   < >   --    TRIG  117  125  91   < >  88   < >   --    ALT   --    --    --    --   19   --    --    CR   --    --   0.63   --   0.68   --    --    GFRESTIMATED   --    --   >90  Non  GFR Calc     --   108   --    --    GFRESTBLACK   --    --   >90   GFR Calc     --   125   --    --    POTASSIUM   --    --   4.0   --   4.5   --    --    TSH   --    --    --    --   2.600   --   2.49    < > = values in this interval not displayed.        Patient under age 50, mutual decision reflected in health maintenance.      Pertinent mammograms are reviewed under the imaging tab.  History of abnormal Pap smear:   Last 3 Pap and HPV Results:   PAP / HPV 3/10/2015 2/24/2014 4/15/2011   PAP NIL OTHER-NIL, See Result NIL       Reviewed and updated as needed this visit by clinical staff  Tobacco  Allergies  Meds  Med Hx  Surg Hx  Fam Hx  Soc Hx        Reviewed and updated as needed this visit by Provider        Shingles in February.  Right low back.  Completely resolved.    Colonoscopy:  Done 2/2018   Hemorrhoid, was banded.      Cold sores:  Once a  "year or every other year.    Large breasts:  Painful shoulders, neck, dents in her shoulders.  Considering breast reduction.    Doing better now.  Review of Systems  CONSTITUTIONAL: NEGATIVE for fever, chills, change in weight  INTEGUMENTARY/SKIN: NEGATIVE for worrisome rashes, moles or lesions  EYES: NEGATIVE for vision changes or irritation  ENT: NEGATIVE for ear, mouth and throat problems  RESP: NEGATIVE for significant cough or SOB  BREAST: NEGATIVE for masses, tenderness or discharge  CV: NEGATIVE for chest pain, palpitations or peripheral edema  GI: NEGATIVE for nausea, abdominal pain, heartburn, or change in bowel habits  : NEGATIVE for unusual urinary or vaginal symptoms. No vaginal bleeding.  MUSCULOSKELETAL: NEGATIVE for significant arthralgias or myalgia  NEURO: NEGATIVE for weakness, dizziness or paresthesias  ENDOCRINE: NEGATIVE for temperature intolerance, skin/hair changes  PSYCHIATRIC: NEGATIVE for changes in mood or affect      OBJECTIVE:   BP 92/68 (BP Location: Left arm, Patient Position: Chair, Cuff Size: Adult Regular)  Pulse 66  Temp 98.1  F (36.7  C) (Oral)  Resp 14  Ht 5' 6\" (1.676 m)  Wt 166 lb (75.3 kg)  BMI 26.79 kg/m2  Physical Exam  GENERAL: healthy, alert and no distress  EYES: Eyes grossly normal to inspection, PERRL and conjunctivae and sclerae normal  HENT: ear canals and TM's normal, nose and mouth without ulcers or lesions  NECK: no adenopathy, no asymmetry, masses, or scars and thyroid normal to palpation  RESP: lungs clear to auscultation - no rales, rhonchi or wheezes  BREAST: normal without masses, tenderness or nipple discharge and no palpable axillary masses or adenopathy  CV: regular rate and rhythm, normal S1 S2, no S3 or S4, no murmur, click or rub, no peripheral edema and peripheral pulses strong  ABDOMEN: soft, nontender, no hepatosplenomegaly, no masses and bowel sounds normal   (female): normal female external genitalia, normal urethral meatus, vaginal mucosa " "pink, moist, well rugated, and normal cervix/adnexa/uterus without masses or discharge  MS: no gross musculoskeletal defects noted, no edema  SKIN: no suspicious lesions or rashes  NEURO: Normal strength and tone, mentation intact and speech normal  PSYCH: mentation appears normal, affect normal/bright    ASSESSMENT/PLAN:   (Z01.419) Encounter for gynecological examination without abnormal finding  (primary encounter diagnosis)  Comment:   Plan: Pap imaged thin layer screen with HPV -         recommended age 30 - 65 years (select HPV order        below), HPV High Risk Types DNA Cervical, Lipid        panel reflex to direct LDL Fasting, Hemoglobin,        Glucose, Vitamin D Deficiency            (B00.1) Cold sore  Comment: history of/infrequent  Plan: acyclovir (ZOVIRAX) 800 MG tablet        Refills given    (N62) Large breasts  Comment:   Plan: PLASTIC SURGERY REFERRAL        Consult with plastics for treatment options.         COUNSELING:  Reviewed preventive health counseling, as reflected in patient instructions         reports that she has never smoked. She has never used smokeless tobacco.    Estimated body mass index is 26.79 kg/(m^2) as calculated from the following:    Height as of this encounter: 5' 6\" (1.676 m).    Weight as of this encounter: 166 lb (75.3 kg).   Weight management plan: Discussed healthy diet and exercise guidelines and patient will follow up in 12 months in clinic to re-evaluate.    Counseling Resources:  ATP IV Guidelines  Pooled Cohorts Equation Calculator  Breast Cancer Risk Calculator  FRAX Risk Assessment  ICSI Preventive Guidelines  Dietary Guidelines for Americans, 2010  USDA's MyPlate  ASA Prophylaxis  Lung CA Screening    RONY Ramirez [facility]  Answers for HPI/ROS submitted by the patient on 5/18/2018   PHQ-2 Score: 0    "

## 2018-05-18 NOTE — MR AVS SNAPSHOT
After Visit Summary   5/18/2018    Kary Perry    MRN: [id number]           Patient Information     Date Of Birth          1970        Visit Information        Provider Department      5/18/2018 8:00 AM Faustina Wright APRN [facility]        Today's Diagnoses     Encounter for gynecological examination without abnormal finding    -  1    Herpes simplex virus infection        Cold sore        Large breasts          Care Instructions      Preventive Health Recommendations  Female Ages 40 to 49    Yearly exam:     See your health care provider every year in order to  1. Review health changes.   2. Discuss preventive care.    3. Review your medicines if your doctor prescribed any.      Get a Pap test every three years (unless you have an abnormal result and your provider advises testing more often).      If you get Pap tests with HPV test, you only need to test every 5 years, unless you have an abnormal result. You do not need a Pap test if your uterus was removed (hysterectomy) and you have not had cancer.      You should be tested each year for STDs (sexually transmitted diseases), if you're at risk.       Ask your doctor if you should have a mammogram.      Have a colonoscopy (test for colon cancer) if someone in your family has had colon cancer or polyps before age 50.       Have a cholesterol test every 5 years.       Have a diabetes test (fasting glucose) after age 45. If you are at risk for diabetes, you should have this test every 3 years.    Shots: Get a flu shot each year. Get a tetanus shot every 10 years.     Nutrition:     Eat at least 5 servings of fruits and vegetables each day.    Eat whole-grain bread, whole-wheat pasta and brown rice instead of white grains and rice.    Talk to your provider about Calcium and Vitamin D.     Lifestyle    Exercise at least 150 minutes a week (an average of 30 minutes a day, 5 days a week). This will help you  control your weight and prevent disease.    Limit alcohol to one drink per day.    No smoking.     Wear sunscreen to prevent skin cancer.    See your dentist every six months for an exam and cleaning.          Follow-ups after your visit        Additional Services     PLASTIC SURGERY REFERRAL       Your provider has referred you to: [facility]: Plastic and Reconstructive Surgery [facility] [phone]   [email or web address]  [facility] [place] [phone] (Dr. Na Barton)   [email or web address]    Please be aware that coverage of these services is subject to the terms and limitations of your health insurance plan.  Call member services at your health plan with any benefit or coverage questions.      Please bring the following with you to your appointment:    (1) Any X-Rays, CTs or MRIs which have been performed.  Contact the facility where they were done to arrange for  prior to your scheduled appointment.    (2) List of current medications  (3) This referral request   (4) Any documents/labs given to you for this referral                  Who to contact     If you have questions or need follow up information about today's clinic visit or your schedule please contact [facility] directly at [phone].  Normal or non-critical lab and imaging results will be communicated to you by MyChart, letter or phone within 4 business days after the clinic has received the results. If you do not hear from us within 7 days, please contact the clinic through MyChart or phone. If you have a critical or abnormal lab result, we will notify you by phone as soon as possible.  Submit refill requests through [employer] or call your pharmacy and they will forward the refill request to us. Please allow 3 business days for your refill to be completed.          Additional Information About Your Visit        [employer]  "Information     [facility] gives you secure access to your electronic health record. If you see a primary care provider, you can also send messages to your care team and make appointments. If you have questions, please call your primary care clinic.  If you do not have a primary care provider, please call [phone] and they will assist you.        Care EveryWhere ID     This is your Care EveryWhere ID. This could be used by other organizations to access your [facility] medical records  [id number]        Your Vitals Were     Pulse Temperature Respirations Height BMI (Body Mass Index)       66 98.1  F (36.7  C) (Oral) 14 5' 6\" (1.676 m) 26.79 kg/m2        Blood Pressure from Last 3 Encounters:   05/18/18 92/68   05/04/18 100/67   02/17/18 112/71    Weight from Last 3 Encounters:   05/18/18 166 lb (75.3 kg)   02/17/18 165 lb (74.8 kg)   03/24/17 165 lb (74.8 kg)              We Performed the Following     Glucose     Hemoglobin     HPV High Risk Types DNA Cervical     Lipid panel reflex to direct LDL Fasting     Pap imaged thin layer screen with HPV - recommended age 30 - 65 years (select HPV order below)     PLASTIC SURGERY REFERRAL     Vitamin D Deficiency          Where to get your medicines      These medications were sent to [facility] - [place], [place] - [address]  [address], [place] [address]     Phone:  [phone]     acyclovir 800 MG tablet          Primary Care Provider Office Phone # Fax #    Faustina Wright, APRN [facility] [phone]       [address] [place] [address]        Equal Access to Services     DEWAYNE JOHNSON AH: Hadii aad ku hadashava Sofawn, waaxda luqadaha, qaybta kaalmada whitney spear. So [facility] [phone].    ATENCIÓN: Si habla español, tiene a domingo disposición servicios gratuitos de asistencia lingüística. Elier hector [phone].    We comply with applicable federal civil rights laws and " [place] laws. We do not discriminate on the basis of race, color, national origin, age, disability, sex, sexual orientation, or gender identity.            Thank you!     Thank you for choosing [facility]  for your care. Our goal is always to provide you with excellent care. Hearing back from our patients is one way we can continue to improve our services. Please take a few minutes to complete the written survey that you may receive in the mail after your visit with us. Thank you!             Your Updated Medication List - Protect others around you: Learn how to safely use, store and throw away your medicines at www.disposemymeds.org.          This list is accurate as of 5/18/18  8:39 AM.  Always use your most recent med list.                   Brand Name Dispense Instructions for use Diagnosis    acyclovir 800 MG tablet    ZOVIRAX    6 tablet    Take 1 tablet (800 mg) by mouth 3 times daily for 2 days    Cold sore       calcium 600 MG tablet      Take 1 tablet by mouth daily.        ibuprofen 200 MG capsule     100 capsule    Take 200-600 mg by mouth every 4 hours as needed for fever.    Neck pain       MULTIVITAMIN TABS   OR      2 tabs daily        OMEGA-3 FISH OIL PO      Take 1,200 mg by mouth daily        scopolamine 72 hr patch    TRANSDERM    5 patch    Apply 1 patch to hairless area behind one ear at least 4 hours before travel.  Remove old patch and change every 3 days (72 hours).    Motion sickness, initial encounter       vitamin B complex with vitamin C Tabs tablet    STRESS TAB     2,000 mg daily        vitamin D 2000 units tablet      Take 4,000 Units by mouth daily

## 2018-05-22 LAB
COPATH REPORT: NORMAL
PAP: NORMAL

## 2018-05-23 NOTE — PROGRESS NOTES
Taran Preston,    This note is to let you know that all of your blood test results from the other day look good.  Your cholesterol levels are slightly elevated (the total cholesterol and LDL cholesterols) but I am not overly concerned about these at this time.    Take good care of yourself and I look forward to seeing you soon    Faustina

## 2018-05-24 LAB
FINAL DIAGNOSIS: NORMAL
HPV HR 12 DNA CVX QL NAA+PROBE: NEGATIVE
HPV16 DNA SPEC QL NAA+PROBE: NEGATIVE
HPV18 DNA SPEC QL NAA+PROBE: NEGATIVE
SPECIMEN DESCRIPTION: NORMAL
SPECIMEN SOURCE CVX/VAG CYTO: NORMAL

## 2018-05-25 LAB — COPATH REPORT: NORMAL

## 2018-05-29 ENCOUNTER — TELEPHONE (OUTPATIENT)
Dept: DERMATOLOGY | Facility: CLINIC | Age: 48
End: 2018-05-29

## 2018-05-29 NOTE — TELEPHONE ENCOUNTER
Called and LM for patient to call back in regards to lab results.  Ba RN-BSN  [facility]  [phone]

## 2018-05-29 NOTE — TELEPHONE ENCOUNTER
[facility] Message Sent:    Jean Paul Pinon-    Great news. There was no HPV DNA that was found in the specimen. Area was just a skin tag, and there is no further treatment needed.    Please let me know if you have any further questions.    MG Cosme-BSN  [facility]  [phone]

## 2018-05-29 NOTE — TELEPHONE ENCOUNTER
Notes Recorded by Marley Calle PA-C on 5/29/2018 at 1:40 PM  Great news, no HPV DNA was seen in the specimen, so it was just a skin tag. No further treatment is needed.

## 2018-08-23 ENCOUNTER — TRANSFERRED RECORDS (OUTPATIENT)
Dept: HEALTH INFORMATION MANAGEMENT | Facility: CLINIC | Age: 48
End: 2018-08-23

## 2018-09-26 ENCOUNTER — RADIANT APPOINTMENT (OUTPATIENT)
Dept: MAMMOGRAPHY | Facility: CLINIC | Age: 48
End: 2018-09-26
Payer: COMMERCIAL

## 2018-09-26 DIAGNOSIS — Z00.00 PREVENTATIVE HEALTH CARE: ICD-10-CM

## 2019-01-22 ENCOUNTER — OFFICE VISIT (OUTPATIENT)
Dept: DERMATOLOGY | Facility: CLINIC | Age: 49
End: 2019-01-22
Payer: COMMERCIAL

## 2019-01-22 VITALS — DIASTOLIC BLOOD PRESSURE: 74 MMHG | SYSTOLIC BLOOD PRESSURE: 117 MMHG | HEART RATE: 83 BPM | OXYGEN SATURATION: 99 %

## 2019-01-22 DIAGNOSIS — L82.0 INFLAMED SEBORRHEIC KERATOSIS: ICD-10-CM

## 2019-01-22 DIAGNOSIS — B07.0 PLANTAR WART: Primary | ICD-10-CM

## 2019-01-22 PROCEDURE — 17110 DESTRUCTION B9 LES UP TO 14: CPT | Performed by: PHYSICIAN ASSISTANT

## 2019-01-22 NOTE — PROGRESS NOTES
HPI:   Kary Perry is a 48 year old female who presents for evaluation of spot on scalp and left arm. Also, plantar wart on right foot.   chief complaint  Location: left arm, scalp, and right foot   Condition present for:  awhile.   Previous treatments include: hydrocortisone on left arm; salicylic acid on wart      Social: sold her house and cabin and bought a new house; will be moving soon    Review Of Systems  Eyes: negative  Ears/Nose/Throat: negative  Respiratory: No shortness of breath, dyspnea on exertion, cough, or hemoptysis  Cardiovascular: negative  Gastrointestinal: negative  Genitourinary: negative  Musculoskeletal: negative  Neurologic: negative  Psychiatric: negative    This document serves as a record of the services and decisions personally performed and made by Marley Calle, MS, PA-C. It was created on her behalf by Elisabeth Snell, a trained medical scribe. The creation of this document is based on the provider's statements to the medical scribe.  Elisabeth Snell 10:29 AM January 22, 2019    PHYSICAL EXAM:    /74   Pulse 83   SpO2 99%   Breastfeeding? No   Skin exam performed as follows: Type 2 skin. Mood appropriate  Alert and Oriented X 3. Well developed, well nourished in no distress.  General appearance: Normal  Head including face: Normal  Eyes: conjunctiva and lids: Normal  Mouth: Lips, teeth, gums: Normal  Neck: Normal  Chest-breast/axillae: Normal  Back: Normal  Spleen and liver: Normal  Cardiovascular: Exam of peripheral vascular system by observation for swelling, varicosities, edema: Normal  Genitalia: groin, buttocks: Normal  Extremities: digits/nails (clubbing): Normal  Eccrine and Apocrine glands: Normal  Right upper extremity: Normal  Left upper extremity: Normal  Right lower extremity: Normal  Left lower extremity: Normal  Skin: Scalp and body hair: See below    1. Inflamed keratotic plaque located on left hairline x 1  2. Verrucous  papule located on right plantar surface     ASSESSMENT/PLAN:     1. Inflamed seborrheic keratosis on left hairline x 1. As physically tender cryosurgery performed. Advised on post op care.   2. Wart on right plantar surface - advised on diagnosis and treatment options. Has tried salicylic acid in the past with improvement. She would like to proceed with LN2 and will continue her OTC treatments at home.   -Cryosurgery performed. Advised on blistering and post op care.       Follow-up: PRN   CC:   Scribed By: Elisabeth Snell, Medical Scribe    The information in this document, created by the medical scribe for me, accurately reflects the services I personally performed and the decisions made by me. I have reviewed and approved this document for accuracy prior to leaving the patient care area.  January 22, 2019 10:37 AM    Marley Calle MS, PA-C

## 2019-01-22 NOTE — LETTER
1/22/2019         RE: Kary Perry  [address]  [facility] [address]        Dear Colleague,    Thank you for referring your patient, Kary Perry, to the [facility]. Please see a copy of my visit note below.    HPI:   Kary Perry is a 48 year old female who presents for evaluation of spot on scalp and left arm. Also, plantar wart on right foot.   chief complaint  Location: left arm, scalp, and right foot   Condition present for:  awhile.   Previous treatments include: hydrocortisone on left arm; salicylic acid on wart      Social: sold her house and cabin and bought a new house; will be moving soon    Review Of Systems  Eyes: negative  Ears/Nose/Throat: negative  Respiratory: No shortness of breath, dyspnea on exertion, cough, or hemoptysis  Cardiovascular: negative  Gastrointestinal: negative  Genitourinary: negative  Musculoskeletal: negative  Neurologic: negative  Psychiatric: negative    This document serves as a record of the services and decisions personally performed and made by Marley Calle, MS, PA-C. It was created on her behalf by Elisabeth Snell, a trained medical scribe. The creation of this document is based on the provider's statements to the medical scribe.  Elisabeth Snell 10:29 AM January 22, 2019    PHYSICAL EXAM:    /74   Pulse 83   SpO2 99%   Breastfeeding? No   Skin exam performed as follows: Type 2 skin. Mood appropriate  Alert and Oriented X 3. Well developed, well nourished in no distress.  General appearance: Normal  Head including face: Normal  Eyes: conjunctiva and lids: Normal  Mouth: Lips, teeth, gums: Normal  Neck: Normal  Chest-breast/axillae: Normal  Back: Normal  Spleen and liver: Normal  Cardiovascular: Exam of peripheral vascular system by observation for swelling, varicosities, edema: Normal  Genitalia: groin, buttocks: Normal  Extremities: digits/nails (clubbing):  Normal  Eccrine and Apocrine glands: Normal  Right upper extremity: Normal  Left upper extremity: Normal  Right lower extremity: Normal  Left lower extremity: Normal  Skin: Scalp and body hair: See below    1. Inflamed keratotic plaque located on left hairline x 1  2. Verrucous papule located on right plantar surface     ASSESSMENT/PLAN:     1. Inflamed seborrheic keratosis on left hairline x 1. As physically tender cryosurgery performed. Advised on post op care.   2. Wart on right plantar surface - advised on diagnosis and treatment options. Has tried salicylic acid in the past with improvement. She would like to proceed with LN2 and will continue her OTC treatments at home.   -Cryosurgery performed. Advised on blistering and post op care.       Follow-up: PRN   CC:   Scribed By: Elisabeth Snell, Medical Scribe    The information in this document, created by the medical scribe for me, accurately reflects the services I personally performed and the decisions made by me. I have reviewed and approved this document for accuracy prior to leaving the patient care area.  January 22, 2019 10:37 AM    Marley Calle MS, PAYESI      Again, thank you for allowing me to participate in the care of your patient.        Sincerely,        Marley Calle PA-C

## 2019-04-15 ENCOUNTER — OFFICE VISIT (OUTPATIENT)
Dept: ORTHOPEDICS | Facility: CLINIC | Age: 49
End: 2019-04-15
Payer: COMMERCIAL

## 2019-04-15 ENCOUNTER — ANCILLARY PROCEDURE (OUTPATIENT)
Dept: GENERAL RADIOLOGY | Facility: CLINIC | Age: 49
End: 2019-04-15
Attending: FAMILY MEDICINE
Payer: COMMERCIAL

## 2019-04-15 DIAGNOSIS — M25.532 LEFT WRIST PAIN: Primary | ICD-10-CM

## 2019-04-15 DIAGNOSIS — M25.532 LEFT WRIST PAIN: ICD-10-CM

## 2019-04-15 NOTE — PROGRESS NOTES
SPORTS & ORTHOPEDIC WALK-IN VISIT 4/15/2019    Primary Care Physician:    Here today for left wrist pain.  Fell onto a boat while on vacation roughly 10 days ago.  Had some pain in wrist immediately but more so in the days afterwards.  Overall pain is improved but she continues to have some point tenderness over the wrist that has worsened after using keyboard today.  No tingling or numbness.  No prior wrist injury.  She does not have pain at rest.  Has tried OTC splint which helps some.    Patient reports about to go snorkeling and jumped off the boat while holding on to the boat.     Reason for visit:     What part of your body is injured / painful?  left wrist    What caused the injury /pain? Recreational/competitive sports injury - Other:snorkeling    How long ago did your injury occur or pain begin? several days ago    What are your most bothersome symptoms? Pain    How would you characterize your symptom?  aching    What makes your symptoms better? Rest    What makes your symptoms worse? Movement    Have you been previously seen for this problem? No    Medical History:    Any recent changes to your medical history? No    Any new medication prescribed since last visit? No    Have you had surgery on this body part before? No           Past Medical History, Current Medications, and Allergies are reviewed in the electronic medical record as appropriate.       EXAM:There were no vitals taken for this visit.    General: Alert, pleasant, no distress  Left wrist: warm, well perfused, SILT throughout     Inspection: No soft tissue swelling, ecchymosis, erythema, deformity     ROM: Full and symmetric, some pain with passive full extension and ulnar deviation     Strength: Able to flex, extend, pronate and supinate against resistance without significant discomfort     Palpation: Mild TTP  diffusely over dorsal wrist.  No specific snuffbox tenderness.  Mild TTP of the ulnar wrist.  No TTP of the remaining carpal  bones or metacarpals.  No TTP of the distal radius or ulnar styloid.       Special Tests: None      Imaging: X-rays are performed and reviewed independently demonstrating no acute fracture or dislocation.  No significant degenerative disease.  See EMR for formal radiology report      Assessment: Patient is a 48 year old female with left wrist sprain.  Less likely occult fracture or significant soft tissue injury.    Recommendations:   Reviewed imaging and assessment with patient in detail  Recommended continued use of splint and offered wrist splint from clinic today.  Given home exercises to begin with gentle range of motion and progress as tolerated  Patient will call if she like to follow-up with hand therapy formally  Recommended follow-up in 1 month if she is continued to have significant discomfort.  Recommended continued icing after activity.  May use acetaminophen or OTC NSAIDs as needed for pain.    Richie Quinones MD

## 2019-07-01 ENCOUNTER — OFFICE VISIT (OUTPATIENT)
Dept: ORTHOPEDICS | Facility: CLINIC | Age: 49
End: 2019-07-01
Payer: COMMERCIAL

## 2019-07-01 DIAGNOSIS — M25.532 LEFT WRIST PAIN: Primary | ICD-10-CM

## 2019-07-01 NOTE — PROGRESS NOTES
[facility]  Orthopedics  Richie Quinones MD  2019     Name: Kary Perry  MRN: [id number]  Age: 49 year old  : 1970  Referring provider: Faustina Wright     Chief Complaint: Left wrist sprain    Date of Injury: 2019    History of Present Illness:   Kary Perry is a 49 year old, female who presents today for follow-up regarding her left wrist sprain. I last evaluated the patient on 4/15/19, at which time she reported falling on a boat 10 days ago. After clinical ad radiographic examination, I recommended she use a splint for immobilization and she was given a HEP to begin for gentle ROM. Today, the patient reports that she was wearing the splint and she was doing well, but when she discontinued the splint she again had pain with mowing the lawn or driving the car. She has a numbness/tingling in her little and ring finger and up into her elbow.    Review of Systems:   A 10-point review of systems was obtained and is negative except for as noted in the HPI.     Physical Examination:     General: Alert, pleasant, no distress  Left wrist: warm, well perfused, SILT throughout     Inspection: No soft tissue swelling, ecchymosis, erythema, deformity     ROM: Full and symmetric, some pain with passive ulnar deviation     Strength: Able to flex, extend, pronate and supinate against resistance without significant discomfort     Palpation: No TTP of dorsal wrist.  No snuffbox tenderness.  Mild TTP of the ulnar wrist.  No TTP of the remaining carpal bones or metacarpals.  No TTP of the distal radius or ulnar styloid.       Special Tests: None    Left Elbow: no swelling or deformity. Full ROM and full strength without pain. No ttp about the elbow including over the ulnar nerve or cubital tunnel. No provocation of symptoms with prolonged elbow flexion.     Assessment:   49 year old, female with left wrist injury that has been slow to improve. Pain is improved but tingling/numbness in  hand persists, which may be related to the same injury, but patient does describe some radiation of symptoms to elbow suggesting possible impingement there.     Plan:   We will schedule her for a left wrist MRI for further evaluation. We may also consider an EMG to investigate her neurological symptoms if symptoms persist and no etiology seen on MRI. She should follow-up after her MRI to discuss the results.     Richie Quinones MD      Scribe Disclosure:  I, Jann Faustin, am serving as a scribe to document services personally performed by Richie Quinones MD at this visit, based upon the provider's statements to me. All documentation has been reviewed by the aforementioned provider prior to being entered into the official medical record.

## 2019-07-01 NOTE — PROGRESS NOTES
SPORTS & ORTHOPEDIC WALK-IN FOLLOW-UP VISIT 7/1/2019    Interval History:     Follow up reason: left wrist     Date of injury: April 2019    Date last seen: 4/15/19    Following Therapeutic Plan: Yes     Pain: Improving    Function: Improving    Interval History: Was doing okay but then has gotten worse. Driving a car, mowing the lawn do not do good. Tingling in pinky and ring finger up to elbow. Was given brace and wore it at night and then had an additional one for work- thinks she wore it for 2-3 weeks and then stopped.     Medical History:    Any recent changes to your medical history? No    Any new medication prescribed since last visit? No    Review of Systems:    Do you have fever, chills, weight loss? No    Do you have any vision problems? No    Do you have any chest pain or edema? No    Do you have any shortness of breath or wheezing?  No    Do you have stomach problems? No    Do you have any numbness or focal weakness? No    Do you have diabetes? No    Do you have problems with bleeding or clotting? No    Do you have an rashes or other skin lesions? No

## 2019-07-03 ENCOUNTER — ANCILLARY PROCEDURE (OUTPATIENT)
Dept: MRI IMAGING | Facility: CLINIC | Age: 49
End: 2019-07-03
Attending: FAMILY MEDICINE
Payer: COMMERCIAL

## 2019-07-03 DIAGNOSIS — M25.532 LEFT WRIST PAIN: ICD-10-CM

## 2019-07-12 ENCOUNTER — OFFICE VISIT (OUTPATIENT)
Dept: ORTHOPEDICS | Facility: CLINIC | Age: 49
End: 2019-07-12
Payer: COMMERCIAL

## 2019-07-12 VITALS — HEIGHT: 66 IN | BODY MASS INDEX: 26.79 KG/M2 | RESPIRATION RATE: 16 BRPM

## 2019-07-12 DIAGNOSIS — M77.8 TENDONITIS OF WRIST, LEFT: Primary | ICD-10-CM

## 2019-07-12 NOTE — PROGRESS NOTES
SPORTS & ORTHOPEDIC WALK-IN FOLLOW-UP VISIT 7/12/2019    Interval History:     Follow up reason: Pain in wrist    Date of injury: April 2019     Date last seen: 7/1/19    Following Therapeutic Plan: Yes     Pain: Improving    Function: Improving    Interval History: Little better, still has some nerve tingling stuff but depends on activity.      Medical History:    Any recent changes to your medical history? No    Any new medication prescribed since last visit? No    Review of Systems:    Do you have fever, chills, weight loss? No    Do you have any vision problems? No    Do you have any chest pain or edema? No    Do you have any shortness of breath or wheezing?  No    Do you have stomach problems? No    Do you have any numbness or focal weakness? No    Do you have diabetes? No    Do you have problems with bleeding or clotting? No    Do you have an rashes or other skin lesions? No

## 2019-07-12 NOTE — LETTER
"2019       RE: Kary Perry  [address] [place] [address]     Dear Colleague,    Thank you for referring your patient, Kary Perry, to the [facility] SPORTS AND ORTHOPAEDIC WALK IN CLINIC at [facility]. Please see a copy of my visit note below.    [facility]  Orthopedics  Richie Quinones MD  2019     Name: Kary Perry  MRN: [id number]  Age: 49 year old  : 1970  Referring provider: Faustina Wright     Chief Complaint: Follow Up of the Left Wrist    History of Present Illness:   Kary Perry is a 49 year old, female who presents today for follow-up regarding a left wrist sprain. I last evaluated the patient on 2019, at which time the patient reported doing well in the splint, but had pain once she mowed the lawn without the splint on. See note for further details. We elected to proceed with an MRI of the left wrist. She presents today to discuss the results of the MRI. Her wrist symptoms have not changed since her last visit. Her pain is primarily localized over the ulnar aspect of wrist into the fifth metacarpal. She voices no further concerns at this time.     Review of Systems:   A 10-point review of systems was obtained and is negative except for as noted in the HPI.     Physical Examination:  Resp. rate 16, height 1.676 m (5' 6\"), not currently breastfeeding.  Exam:  Patient is alert, in no acute distress, pleasant and conversational  No further exam this visit    Imaging:  MR imaging of the left wrist without contrast (7/3/2019)  1. Low-grade sprain of the dorsal scapholunate ligament.  2. Mild tenosynovitis of the extensor carpi ulnaris.    I have independently reviewed the above imaging studies; the results were discussed with the patient.     Assessment:   49 year old, female with mild scapholunate sprain and ECU tendonitis     Plan:   Reviewed the imaging with the patient in detail.  "   Discussed etiology of her diagnosis with the patient in detail.    Discussed treatment options including NSAIDs, icing, home exercise program and hand therapy.  Provided a home exercise program for wrist rehab.   Recommended NSAIDs as needed to reduce inflammation.  She will call if she would like referral to hand therapy or to schedule a steroid injection.  Follow-up with me as needed or if symptoms do not improve.     Richie Quinones MD      Scribe Disclosure:  I, Nacho Sveta, am serving as a scribe to document services personally performed by Richie Quinones MD at this visit, based upon the provider's statements to me. All documentation has been reviewed by the aforementioned provider prior to being entered into the official medical record.                 SPORTS & ORTHOPEDIC WALK-IN FOLLOW-UP VISIT 7/12/2019    Interval History:     Follow up reason: Pain in wrist    Date of injury: April 2019     Date last seen: 7/1/19    Following Therapeutic Plan: Yes     Pain: Improving    Function: Improving    Interval History: Little better, still has some nerve tingling stuff but depends on activity.      Medical History:    Any recent changes to your medical history? No    Any new medication prescribed since last visit? No    Review of Systems:    Do you have fever, chills, weight loss? No    Do you have any vision problems? No    Do you have any chest pain or edema? No    Do you have any shortness of breath or wheezing?  No    Do you have stomach problems? No    Do you have any numbness or focal weakness? No    Do you have diabetes? No    Do you have problems with bleeding or clotting? No    Do you have an rashes or other skin lesions? No             Again, thank you for allowing me to participate in the care of your patient.      Sincerely,    Richie Quinones MD

## 2019-07-12 NOTE — PROGRESS NOTES
"[facility]  Orthopedics  Richie Quinones MD  2019     Name: Kary Perry  MRN: [id number]  Age: 49 year old  : 1970  Referring provider: Faustina Wright     Chief Complaint: Follow Up of the Left Wrist    History of Present Illness:   Kary Perry is a 49 year old, female who presents today for follow-up regarding a left wrist sprain. I last evaluated the patient on 2019, at which time the patient reported doing well in the splint, but had pain once she mowed the lawn without the splint on. See note for further details. We elected to proceed with an MRI of the left wrist. She presents today to discuss the results of the MRI. Her wrist symptoms have not changed since her last visit. Her pain is primarily localized over the ulnar aspect of wrist into the fifth metacarpal. She voices no further concerns at this time.     Review of Systems:   A 10-point review of systems was obtained and is negative except for as noted in the HPI.     Physical Examination:  Resp. rate 16, height 1.676 m (5' 6\"), not currently breastfeeding.  Exam:  Patient is alert, in no acute distress, pleasant and conversational  No further exam this visit    Imaging:  MR imaging of the left wrist without contrast (7/3/2019)  1. Low-grade sprain of the dorsal scapholunate ligament.  2. Mild tenosynovitis of the extensor carpi ulnaris.    I have independently reviewed the above imaging studies; the results were discussed with the patient.     Assessment:   49 year old, female with mild scapholunate sprain and ECU tendonitis     Plan:   Reviewed the imaging with the patient in detail.    Discussed etiology of her diagnosis with the patient in detail.    Discussed treatment options including NSAIDs, icing, home exercise program and hand therapy.  Provided a home exercise program for wrist rehab.   Recommended NSAIDs as needed to reduce inflammation.  She will call if she would like referral to hand therapy or " to schedule a steroid injection.  Follow-up with me as needed or if symptoms do not improve.     Richie Quinones MD      Scribe Disclosure:  I, Nacho Bangura, am serving as a scribe to document services personally performed by Richie Quinones MD at this visit, based upon the provider's statements to me. All documentation has been reviewed by the aforementioned provider prior to being entered into the official medical record.

## 2019-09-17 ENCOUNTER — OFFICE VISIT (OUTPATIENT)
Dept: DERMATOLOGY | Facility: CLINIC | Age: 49
End: 2019-09-17
Payer: COMMERCIAL

## 2019-09-17 VITALS — HEART RATE: 86 BPM | DIASTOLIC BLOOD PRESSURE: 69 MMHG | OXYGEN SATURATION: 96 % | SYSTOLIC BLOOD PRESSURE: 112 MMHG

## 2019-09-17 DIAGNOSIS — D48.5 NEOPLASM OF UNCERTAIN BEHAVIOR OF SKIN: Primary | ICD-10-CM

## 2019-09-17 DIAGNOSIS — D22.9 NEVUS: ICD-10-CM

## 2019-09-17 DIAGNOSIS — D18.01 ANGIOMA OF SKIN: ICD-10-CM

## 2019-09-17 DIAGNOSIS — L82.1 SEBORRHEIC KERATOSIS: ICD-10-CM

## 2019-09-17 DIAGNOSIS — L81.4 LENTIGO: ICD-10-CM

## 2019-09-17 DIAGNOSIS — B07.9 VIRAL WARTS, UNSPECIFIED TYPE: ICD-10-CM

## 2019-09-17 PROCEDURE — 88305 TISSUE EXAM BY PATHOLOGIST: CPT | Mod: TC | Performed by: PHYSICIAN ASSISTANT

## 2019-09-17 PROCEDURE — 17110 DESTRUCTION B9 LES UP TO 14: CPT | Performed by: PHYSICIAN ASSISTANT

## 2019-09-17 PROCEDURE — 11102 TANGNTL BX SKIN SINGLE LES: CPT | Mod: 59 | Performed by: PHYSICIAN ASSISTANT

## 2019-09-17 PROCEDURE — 99214 OFFICE O/P EST MOD 30 MIN: CPT | Mod: 25 | Performed by: PHYSICIAN ASSISTANT

## 2019-09-17 PROCEDURE — 11103 TANGNTL BX SKIN EA SEP/ADDL: CPT | Performed by: PHYSICIAN ASSISTANT

## 2019-09-17 NOTE — PROGRESS NOTES
HPI:   Chief complaint: Kary Perry is a 49 year old female who presents for Full skin cancer screening to rule out skin cancer.  Last Skin Exam: 1 year ago      1st Baseline: no  Personal HX of Skin Cancer: none   Personal HX of Malignant Melanoma: none   Family HX of Skin Cancer / Malignant Melanoma: mother BCC  Personal HX of Atypical Moles: none  Risk factors: sun exposure, blistering sunburn and tanning bed use in the past.   New / Changing lesions: yes, spot on upper back and right upper thigh  Social History: Wife, Estrellita. 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 Moved last spring.  On review of systems, there are no further skin complaints, patient is feeling otherwise well.  See patient intake sheet.  ROS of the following were done and are negative: Constitutional, Eyes, Ears, Nose,   Mouth, Throat, Cardiovascular, Respiratory, GI, Genitourinary, Musculoskeletal,   Psychiatric, Endocrine, Allergic/Immunologic.    This document serves as a record of the services and decisions personally performed and made by Marley Calle, MS, PA-C. It was created on her behalf by Elisabeth nSell, a trained medical scribe. The creation of this document is based on the provider's statements to the medical scribe.  Elisabeth Snell 4:07 PM September 17, 2019      PHYSICAL EXAM:   /69   Pulse 86   SpO2 96%   Breastfeeding? No   Skin exam performed as follows: Type 2 skin. Mood appropriate  Alert and Oriented X 3. Well developed, well nourished in no distress.  General appearance: Normal  Head including face: Normal  Eyes: conjunctiva and lids: Normal  Mouth: Lips, teeth, gums: Normal  Neck: Normal  Chest-breast/axillae: Normal  Back: Normal  Spleen and liver: Normal  Cardiovascular: Exam of peripheral vascular system by observation for swelling, varicosities, edema: Normal  Genitalia: groin, buttocks: Normal  Extremities: digits/nails (clubbing):  Normal  Eccrine and Apocrine glands: Normal  Right upper extremity: Normal  Left upper extremity: Normal  Right lower extremity: Normal  Left lower extremity: Normal  Skin: Scalp and body hair: See below    Pt deferred exam of breasts, groin, buttocks: No    Other physical findings:  1. Multiple pigmented macules on extremities and trunk  2. Multiple pigmented macules on face, trunk and extremities  3. Multiple vascular papules on trunk, arms and legs  4. Multiple scattered keratotic plaques  5. 4 mm pink fleshy papule on left trapezius  6. 3 mm pink fleshy papule on left lower abdomen   7. Verrucous papule located on right plantar surface          Except as noted above, no other signs of skin cancer or melanoma.     ASSESSMENT/PLAN:   Benign Full skin cancer screening today.     Patient with history of none  Advised on monthly self exams and 1 year  Patient Education: Appropriate brochures given.    Multiple benign appearing nevi on arms, legs and trunk. Discussed ABCDEs of melanoma and sunscreen.   Multiple lentigos on arms, legs and trunk. Advised benign, no treatment needed.  Multiple scattered angiomas. Advised benign, no treatment needed.   Seborrheic keratosis on arms, legs and trunk. Advised benign, no treatment needed.  Dermal nevus r/o atypicality on left trapezius. Shave bx in typical fashion .  Area cleaned with betadyne and anesthetized with 1% lidocaine with epi .  Dermablade used to remove the lesion and sent to pathology. Bleeding was cauterized. Pt tolerated procedure well.   Dermal nevus r/o atypicality on left lower abdomen. Shave bx in typical fashion .  Area cleaned with betadyne and anesthetized with 1% lidocaine with epi .  Dermablade used to remove the lesion and sent to pathology. Bleeding was cauterized. Pt tolerated procedure well.  Wart on right plantar surface - advised on diagnosis and treatment options. Has tried LN2 in the past. Discussed LN2, cantharidin, Candin, imiquimod and OTC  treatments. Discussed likely need for multiple treatments. After lengthy discussion, she would like to proceed with LN2.    --Cryosurgery performed. Advised on blistering and post op care.         Follow-up: pending path/yearly FSE/PRN sooner    1.) Patient was asked about new and changing moles. YES  2.) Patient received a complete physical skin examination: YES  3.) Patient was counseled to perform a monthly self skin examination: YES  Scribed By:Elisabeth Snell, Medical Scribe      The information in this document, created by the medical scribe for me, accurately reflects the services I personally performed and the decisions made by me. I have reviewed and approved this document for accuracy prior to leaving the patient care area.  September 17, 2019 4:15 PM    Marley Calle MS, PA-C

## 2019-09-17 NOTE — LETTER
9/17/2019         RE: Kary Perry  [address] [facility] [address]        Dear Colleague,    Thank you for referring your patient, Kary Perry, to the [facility]. Please see a copy of my visit note below.    HPI:   Chief complaint: Kary Perry is a 49 year old female who presents for Full skin cancer screening to rule out skin cancer.  Last Skin Exam: 1 year ago      1st Baseline: no  Personal HX of Skin Cancer: none   Personal HX of Malignant Melanoma: none   Family HX of Skin Cancer / Malignant Melanoma: mother BCC  Personal HX of Atypical Moles: none  Risk factors: sun exposure, blistering sunburn and tanning bed use in the past.   New / Changing lesions: yes, spot on upper back and right upper thigh  Social History: Wife, Estrellita. 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 Moved last spring.  On review of systems, there are no further skin complaints, patient is feeling otherwise well.  See patient intake sheet.  ROS of the following were done and are negative: Constitutional, Eyes, Ears, Nose,   Mouth, Throat, Cardiovascular, Respiratory, GI, Genitourinary, Musculoskeletal,   Psychiatric, Endocrine, Allergic/Immunologic.    This document serves as a record of the services and decisions personally performed and made by Marley Calle, MS, PA-C. It was created on her behalf by Elisabeth Snell, a trained medical scribe. The creation of this document is based on the provider's statements to the medical scribe.  Elisabeth Snell 4:07 PM September 17, 2019      PHYSICAL EXAM:   /69   Pulse 86   SpO2 96%   Breastfeeding? No   Skin exam performed as follows: Type 2 skin. Mood appropriate  Alert and Oriented X 3. Well developed, well nourished in no distress.  General appearance: Normal  Head including face: Normal  Eyes: conjunctiva and lids: Normal  Mouth: Lips, teeth, gums:  Normal  Neck: Normal  Chest-breast/axillae: Normal  Back: Normal  Spleen and liver: Normal  Cardiovascular: Exam of peripheral vascular system by observation for swelling, varicosities, edema: Normal  Genitalia: groin, buttocks: Normal  Extremities: digits/nails (clubbing): Normal  Eccrine and Apocrine glands: Normal  Right upper extremity: Normal  Left upper extremity: Normal  Right lower extremity: Normal  Left lower extremity: Normal  Skin: Scalp and body hair: See below    Pt deferred exam of breasts, groin, buttocks: No    Other physical findings:  1. Multiple pigmented macules on extremities and trunk  2. Multiple pigmented macules on face, trunk and extremities  3. Multiple vascular papules on trunk, arms and legs  4. Multiple scattered keratotic plaques  5. 4 mm pink fleshy papule on left trapezius  6. 3 mm pink fleshy papule on left lower abdomen   7. Verrucous papule located on right plantar surface          Except as noted above, no other signs of skin cancer or melanoma.     ASSESSMENT/PLAN:   Benign Full skin cancer screening today.     Patient with history of none  Advised on monthly self exams and 1 year  Patient Education: Appropriate brochures given.    Multiple benign appearing nevi on arms, legs and trunk. Discussed ABCDEs of melanoma and sunscreen.   Multiple lentigos on arms, legs and trunk. Advised benign, no treatment needed.  Multiple scattered angiomas. Advised benign, no treatment needed.   Seborrheic keratosis on arms, legs and trunk. Advised benign, no treatment needed.  Dermal nevus r/o atypicality on left trapezius. Shave bx in typical fashion .  Area cleaned with betadyne and anesthetized with 1% lidocaine with epi .  Dermablade used to remove the lesion and sent to pathology. Bleeding was cauterized. Pt tolerated procedure well.   Dermal nevus r/o atypicality on left lower abdomen. Shave bx in typical fashion .  Area cleaned with betadyne and anesthetized with 1% lidocaine with epi .   Dermablade used to remove the lesion and sent to pathology. Bleeding was cauterized. Pt tolerated procedure well.  Wart on right plantar surface - advised on diagnosis and treatment options. Has tried LN2 in the past. Discussed LN2, cantharidin, Candin, imiquimod and OTC treatments. Discussed likely need for multiple treatments. After lengthy discussion, she would like to proceed with LN2.    --Cryosurgery performed. Advised on blistering and post op care.         Follow-up: pending path/yearly FSE/PRN sooner    1.) Patient was asked about new and changing moles. YES  2.) Patient received a complete physical skin examination: YES  3.) Patient was counseled to perform a monthly self skin examination: YES  Scribed By:Elisabeth Snell, Medical Scribe      The information in this document, created by the medical scribe for me, accurately reflects the services I personally performed and the decisions made by me. I have reviewed and approved this document for accuracy prior to leaving the patient care area.  September 17, 2019 4:15 PM    Marley Calle MS, PAYESI        Again, thank you for allowing me to participate in the care of your patient.        Sincerely,        Marley Calle PA-C

## 2019-09-17 NOTE — PATIENT INSTRUCTIONS
Wound Care Instructions     FOR SUPERFICIAL WOUNDS     [facility] [phone]                 AFTER 24 HOURS YOU SHOULD REMOVE THE BANDAGE AND BEGIN DAILY DRESSING CHANGES AS FOLLOWS:     1) Remove Dressing.     2) Clean and dry the area with tap water using a Q-tip or sterile gauze pad.     3) Apply Vaseline, Aquaphor, Polysporin ointment or Bacitracin ointment over entire wound.  Do NOT use Neosporin ointment.     4) Cover the wound with a band-aid, or a sterile non-stick gauze pad and micropore paper tape    REPEAT THESE INSTRUCTIONS AT LEAST ONCE A DAY UNTIL THE WOUND HAS COMPLETELY HEALED.    It is an old wives tale that a wound heals better when it is exposed to air and allowed to dry out. The wound will heal faster with a better cosmetic result if it is kept moist with ointment and covered with a bandage.    **Do not let the wound dry out.**    Supplies Needed:      *Cotton tipped applicators (Q-tips)    *Vaseline, Aquaphor, Polysporin or Bacitracin Ointment (NOT NEOSPORIN)    *Band-aids or non-stick gauze pads and micropore paper tape.      PATIENT INFORMATION:    During the healing process you will notice a number of changes. All wounds develop a small halo of redness surrounding the wound.  This means healing is occurring. Severe itching with extensive redness usually indicates sensitivity to the ointment or bandage tape used to dress the wound.  You should call our office if this develops.      Swelling  and/or discoloration around your surgical site is common, particularly when performed around the eye.    All wounds normally drain.  The larger the wound the more drainage there will be.  After 7-10 days, you will notice the wound beginning to shrink and new skin will begin to grow.  The wound is healed when you can see skin has formed over the entire area.  A healed wound has a healthy, shiny look to the surface and is red to dark pink in color to normalize.  Wounds may take approximately  4-6 weeks to heal.  Larger wounds may take 6-8 weeks.  After the wound is healed you may discontinue dressing changes.    You may experience a sensation of tightness as your wound heals. This is normal and will gradually subside.    Your healed wound may be sensitive to temperature changes. This sensitivity improves with time, but if you re having a lot of discomfort, try to avoid temperature extremes.    Patients frequently experience itching after their wound appears to have healed because of the continue healing under the skin.  Plain Vaseline will help relieve the itching.      POSSIBLE COMPLICATIONS    BLEEDIN. Leave the bandage in place.  2. Use tightly rolled up gauze or a cloth to apply direct pressure over the bandage for 30  minutes.  3. Reapply pressure for an additional 30 minutes if necessary  4. Use additional gauze and tape to maintain pressure once the bleeding has stopped.

## 2019-09-23 LAB — COPATH REPORT: NORMAL

## 2019-10-11 ENCOUNTER — OFFICE VISIT (OUTPATIENT)
Dept: FAMILY MEDICINE | Facility: CLINIC | Age: 49
End: 2019-10-11
Payer: COMMERCIAL

## 2019-10-11 VITALS
OXYGEN SATURATION: 99 % | TEMPERATURE: 98.2 F | BODY MASS INDEX: 26.36 KG/M2 | HEIGHT: 66 IN | WEIGHT: 164 LBS | RESPIRATION RATE: 16 BRPM | HEART RATE: 68 BPM | DIASTOLIC BLOOD PRESSURE: 74 MMHG | SYSTOLIC BLOOD PRESSURE: 92 MMHG

## 2019-10-11 DIAGNOSIS — Z13.220 SCREENING FOR HYPERLIPIDEMIA: ICD-10-CM

## 2019-10-11 DIAGNOSIS — Z00.00 ROUTINE GENERAL MEDICAL EXAMINATION AT A HEALTH CARE FACILITY: Primary | ICD-10-CM

## 2019-10-11 DIAGNOSIS — Z13.1 SCREENING FOR DIABETES MELLITUS: ICD-10-CM

## 2019-10-11 DIAGNOSIS — Z13.0 SCREENING FOR IRON DEFICIENCY ANEMIA: ICD-10-CM

## 2019-10-11 DIAGNOSIS — E55.9 VITAMIN D DEFICIENCY: ICD-10-CM

## 2019-10-11 DIAGNOSIS — T75.3XXA MOTION SICKNESS, INITIAL ENCOUNTER: ICD-10-CM

## 2019-10-11 DIAGNOSIS — B00.1 COLD SORE: ICD-10-CM

## 2019-10-11 LAB
CHOLEST SERPL-MCNC: 217 MG/DL
GLUCOSE SERPL-MCNC: 83 MG/DL (ref 70–99)
HDLC SERPL-MCNC: 64 MG/DL
HGB BLD-MCNC: 12.8 G/DL (ref 11.7–15.7)
LDLC SERPL CALC-MCNC: 133 MG/DL
NONHDLC SERPL-MCNC: 153 MG/DL
TRIGL SERPL-MCNC: 98 MG/DL

## 2019-10-11 PROCEDURE — 36415 COLL VENOUS BLD VENIPUNCTURE: CPT | Performed by: NURSE PRACTITIONER

## 2019-10-11 PROCEDURE — 99213 OFFICE O/P EST LOW 20 MIN: CPT | Mod: 25 | Performed by: NURSE PRACTITIONER

## 2019-10-11 PROCEDURE — 82947 ASSAY GLUCOSE BLOOD QUANT: CPT | Performed by: NURSE PRACTITIONER

## 2019-10-11 PROCEDURE — 99396 PREV VISIT EST AGE 40-64: CPT | Performed by: NURSE PRACTITIONER

## 2019-10-11 PROCEDURE — 80061 LIPID PANEL: CPT | Performed by: NURSE PRACTITIONER

## 2019-10-11 PROCEDURE — 85018 HEMOGLOBIN: CPT | Performed by: NURSE PRACTITIONER

## 2019-10-11 RX ORDER — SCOLOPAMINE TRANSDERMAL SYSTEM 1 MG/1
PATCH, EXTENDED RELEASE TRANSDERMAL
Qty: 5 PATCH | Refills: 0 | Status: SHIPPED | OUTPATIENT
Start: 2019-10-11 | End: 2021-12-07

## 2019-10-11 RX ORDER — ACYCLOVIR 400 MG/1
400 TABLET ORAL 3 TIMES DAILY PRN
Qty: 20 TABLET | Refills: 1 | Status: SHIPPED | OUTPATIENT
Start: 2019-10-11 | End: 2020-02-27

## 2019-10-11 RX ORDER — ACYCLOVIR 400 MG/1
400 TABLET ORAL
COMMUNITY
End: 2019-10-11

## 2019-10-11 ASSESSMENT — ENCOUNTER SYMPTOMS
DIARRHEA: 0
FREQUENCY: 0
DIZZINESS: 0
HEARTBURN: 0
ABDOMINAL PAIN: 0
SHORTNESS OF BREATH: 0
DYSURIA: 0
CHILLS: 0
CONSTIPATION: 0
FEVER: 0
NERVOUS/ANXIOUS: 0
PARESTHESIAS: 0
HEMATOCHEZIA: 0
MYALGIAS: 0
NAUSEA: 0
COUGH: 0
HEMATURIA: 0
WEAKNESS: 0
JOINT SWELLING: 0
ARTHRALGIAS: 0
HEADACHES: 0
SORE THROAT: 0
PALPITATIONS: 0
BREAST MASS: 0
EYE PAIN: 0

## 2019-10-11 ASSESSMENT — MIFFLIN-ST. JEOR: SCORE: 1380.65

## 2019-10-11 NOTE — RESULT ENCOUNTER NOTE
Taran Preston,    This note is to let you know that your lab results look good. Your cholesterol levels are mildly elevated. I recommend a diet low in fat and cholesterol as well as regular aerobic activity. I would like to recheck your cholesterol in one year.    Let me know if you have any questions. I will repeat your labs in one year.    Faustina URIBE CNP

## 2019-10-11 NOTE — PROGRESS NOTES
SUBJECTIVE:   CC: Kary Perry is an 49 year old woman who presents for preventive health visit.     Healthy Habits:     Getting at least 3 servings of Calcium per day:  Yes    Bi-annual eye exam:  Yes    Dental care twice a year:  Yes    Sleep apnea or symptoms of sleep apnea:  None    Diet:  Regular (no restrictions)    Frequency of exercise:  2-3 days/week    Duration of exercise:  15-30 minutes    Taking medications regularly:  Not Applicable    Medication side effects:  Not applicable    PHQ-2 Total Score: 0    Additional concerns today:  No      Today's PHQ-2 Score:   PHQ-2 ( 1999 Pf[employer]) 10/11/2019   Q1: Little interest or pleasure in doing things 0   Q2: Feeling down, depressed or hopeless 0   PHQ-2 Score 0   Q1: Little interest or pleasure in doing things Not at all   Q2: Feeling down, depressed or hopeless Not at all   PHQ-2 Score 0       Abuse: Current or Past(Physical, Sexual or Emotional)- No  Do you feel safe in your environment? Yes    Social History     Tobacco Use     Smoking status: Never Smoker     Smokeless tobacco: Never Used   Substance Use Topics     Alcohol use: Yes     Comment: 3 drinks per week     If you drink alcohol do you typically have >3 drinks per day or >7 drinks per week? No    Alcohol Use 10/11/2019   Prescreen: >3 drinks/day or >7 drinks/week? No   Prescreen: >3 drinks/day or >7 drinks/week? -   No flowsheet data found.    Reviewed orders with patient.  Reviewed health maintenance and updated orders accordingly - Yes  Lab work is in process  Labs reviewed in EPIC  BP Readings from Last 3 Encounters:   10/11/19 92/74   09/17/19 112/69   01/22/19 117/74    Wt Readings from Last 3 Encounters:   10/11/19 74.4 kg (164 lb)   05/18/18 75.3 kg (166 lb)   02/17/18 74.8 kg (165 lb)                  Patient Active Problem List   Diagnosis     Undiagnosed cardiac murmurs     Other specified disorders of temporomandibular joint     Diffuse cystic mastopathy     Herpes simplex  virus (HSV) infection     DUB (dysfunctional uterine bleeding)     Sprain and strain of shoulder and upper arm     CARDIOVASCULAR SCREENING; LDL GOAL LESS THAN 160     Dermatitis     Dysphagia     Vitamin D deficiency     Neck sprain     Eczema     History of ovarian cyst     Past Surgical History:   Procedure Laterality Date     C NONSPECIFIC PROCEDURE  1993    Tonsillectomy     HYSTEROSCOPY  10/04    D&C/hysteroscopy/polypectomy     HYSTEROSCOPY  5/08    hysteroscopy/polypectomy     HYSTEROSCOPY  11/2/09    hysteroscopy/D&C/endometrial ablation       Social History     Tobacco Use     Smoking status: Never Smoker     Smokeless tobacco: Never Used   Substance Use Topics     Alcohol use: Yes     Comment: 3 drinks per week     Family History   Problem Relation Age of Onset     Hypertension Mother      Lipids Mother      Obesity Mother      Blood Disease Mother         low epo level     Cancer Mother 67        Lymphoma chemo and radiation     Hyperlipidemia Mother      Other Cancer Mother         Lymphoma     Other - See Comments Mother         anemia     Skin Cancer Mother      Cancer Maternal Grandmother         Melanoma/Leukemia     Osteoporosis Maternal Grandmother      C.A.D. Maternal Grandmother      Arthritis Maternal Grandmother      Obesity Maternal Grandmother      Breast Cancer Maternal Grandmother         age 60-70     Skin Cancer Maternal Grandmother      Heart Disease Paternal Grandmother      Obesity Paternal Grandmother      Blood Disease Paternal Grandmother         Used Blood Thinners     Cerebrovascular Disease Paternal Grandmother      Hypertension Paternal Grandmother      C.A.D. Paternal Grandmother      Arthritis Paternal Grandmother      Musculoskeletal Disorder Paternal Grandmother         Anti Phospho Lipid Syndrome     Cerebrovascular Disease Paternal Grandfather      Diabetes Paternal Grandfather      Heart Disease Paternal Grandfather         Heart Attack     Alcohol/Drug Father          methamphetamine     Heart Disease Father         passed away from CHF     Substance Abuse Father      Breast Cancer Other          Current Outpatient Medications   Medication Sig Dispense Refill     acyclovir (ZOVIRAX) 400 MG tablet Take 1 tablet (400 mg) by mouth 3 times daily as needed (cold sore) 20 tablet 1     Calcium 600 MG tablet Take 1 tablet by mouth daily.       Cholecalciferol (VITAMIN D-3) 5000 units TABS Take 1 tablet by mouth daily 90 tablet 3     Ibuprofen 200 MG capsule Take 200-600 mg by mouth every 4 hours as needed for fever. 100 capsule 3     MULTIVITAMIN TABS   OR 2 tabs daily       Omega-3 Fatty Acids (OMEGA-3 FISH OIL PO) Take 1,200 mg by mouth daily        scopolamine (TRANSDERM) 1 MG/3DAYS 72 hr patch Apply 1 patch to hairless area behind one ear at least 4 hours before travel.  Remove old patch and change every 3 days (72 hours). 5 patch 0     VITAMIN B COMPLEX PO TABS 2,000 mg daily       Allergies   Allergen Reactions     Sulfa Drugs Hives     Recent Labs   Lab Test 05/18/18  0846 03/24/17  0839 04/05/16  0858 03/10/15  0918  12/09/11   A1C  --   --  5.7  --   --   --    * 159* 129* 88   < > 106   HDL 64 64 58 63   < > 58   TRIG 134 117 125 91   < > 88   ALT  --   --   --   --   --  19   CR  --   --   --  0.63  --  0.68   GFRESTIMATED  --   --   --  >90  Non  GFR Calc    --  108   GFRESTBLACK  --   --   --  >90  African American GFR Calc    --  125   POTASSIUM  --   --   --  4.0  --  4.5   TSH  --   --   --   --   --  2.600    < > = values in this interval not displayed.        Mammogram Screening: Patient over age 50, mutual decision to screen reflected in health maintenance.    Pertinent mammograms are reviewed under the imaging tab.  History of abnormal Pap smear:   NO - age 30-65 PAP every 5 years with negative HPV co-testing recommended  Last 3 Pap Results:   PAP (no units)   Date Value   05/18/2018 NIL   03/10/2015 NIL   02/24/2014 OTHER-NIL, See Result  "    PAP / HPV Latest Ref Rng & Units 5/18/2018 3/10/2015 2/24/2014   PAP - NIL NIL OTHER-NIL, See Result   HPV 16 DNA NEG:Negative Negative - -   HPV 18 DNA NEG:Negative Negative - -   OTHER HR HPV NEG:Negative Negative - -     Reviewed and updated as needed this visit by clinical staff  Tobacco  Allergies  Meds  Med Hx  Surg Hx  Fam Hx  Soc Hx        Reviewed and updated as needed this visit by Provider        Cold sores.   She gets an outbreak 3 times a year.  Acyclovir works well.  She is requesting refills today.    Motion sickness:  She will use scopolomine patches for sailing/motion sickness.   It works well.  She is requesting refills today.     Review of Systems   Constitutional: Negative for chills and fever.   HENT: Negative for congestion, ear pain, hearing loss and sore throat.    Eyes: Negative for pain and visual disturbance.   Respiratory: Negative for cough and shortness of breath.    Cardiovascular: Negative for chest pain, palpitations and peripheral edema.   Gastrointestinal: Negative for abdominal pain, constipation, diarrhea, heartburn, hematochezia and nausea.   Breasts:  Negative for tenderness, breast mass and discharge.   Genitourinary: Negative for dysuria, frequency, genital sores, hematuria, pelvic pain, urgency, vaginal bleeding and vaginal discharge.   Musculoskeletal: Negative for arthralgias, joint swelling and myalgias.   Skin: Negative for rash.   Neurological: Negative for dizziness, weakness, headaches and paresthesias.   Psychiatric/Behavioral: Negative for mood changes. The patient is not nervous/anxious.         OBJECTIVE:   BP 92/74 (BP Location: Left arm, Patient Position: Sitting, Cuff Size: Adult Regular)   Pulse 68   Temp 98.2  F (36.8  C) (Oral)   Resp 16   Ht 1.676 m (5' 6\")   Wt 74.4 kg (164 lb)   SpO2 99%   BMI 26.47 kg/m    Physical Exam  GENERAL: healthy, alert and no distress  EYES: Eyes grossly normal to inspection, PERRL and conjunctivae and sclerae " normal  HENT: ear canals and TM's normal, nose and mouth without ulcers or lesions  NECK: no adenopathy, no asymmetry, masses, or scars and thyroid normal to palpation  RESP: lungs clear to auscultation - no rales, rhonchi or wheezes  CV: regular rate and rhythm, normal S1 S2, no S3 or S4, no murmur, click or rub, no peripheral edema and peripheral pulses strong  ABDOMEN: soft, nontender, no hepatosplenomegaly, no masses and bowel sounds normal  MS: no gross musculoskeletal defects noted, no edema  SKIN: no suspicious lesions or rashes  NEURO: Normal strength and tone, mentation intact and speech normal  PSYCH: mentation appears normal, affect normal/bright     Diagnostic Test Results:  Labs reviewed in [facility]  Labs drawn, results pending    ASSESSMENT/PLAN:   (Z00.00) Routine general medical examination at a health care facility  (primary encounter diagnosis)  Comment:   Plan: Lipid panel reflex to direct LDL Fasting,         Hemoglobin, Glucose, Cholecalciferol (VITAMIN         D-3) 5000 units TABS            (T75.3XXA) Motion sickness, initial encounter  Comment: History of  Plan: scopolamine (TRANSDERM) 1 MG/3DAYS 72 hr patch        I did go ahead and refill her scopolamine patches today.  She will use them as prescribed/as needed.    (B00.1) Cold sore  Comment: History of  Plan: acyclovir (ZOVIRAX) 400 MG tablet        Her cold sores respond well to acyclovir.  Refills were given.  I encouraged her to continue healthy self cares and return to clinic to see me yearly for refills, sooner as needed.    (Z13.1) Screening for diabetes mellitus  Comment:   Plan: Done today    (Z13.0) Screening for iron deficiency anemia  Comment:   Plan: History ofdone today    (Z13.220) Screening for hyperlipidemia  Comment:   Plan:     (E55.9) Vitamin D deficiency  Comment:   Plan: Cholecalciferol (VITAMIN D-3) 5000 units TABS        She will continue her vitamin D 5000 units a day.  I offered to draw a vitamin D level but she  "declined.      COUNSELING:  Reviewed preventive health counseling, as reflected in patient instructions    Estimated body mass index is 26.47 kg/m  as calculated from the following:    Height as of this encounter: 1.676 m (5' 6\").    Weight as of this encounter: 74.4 kg (164 lb).    Weight management plan: Discussed healthy diet and exercise guidelines     reports that she has never smoked. She has never used smokeless tobacco.      Counseling Resources:  ATP IV Guidelines  Pooled Cohorts Equation Calculator  Breast Cancer Risk Calculator  FRAX Risk Assessment  ICSI Preventive Guidelines  Dietary Guidelines for Americans, 2010  USDA's MyPlate  ASA Prophylaxis  Lung CA Screening    RONY Ramirez CNP  [facility]  "

## 2019-10-16 ENCOUNTER — ANCILLARY PROCEDURE (OUTPATIENT)
Dept: MAMMOGRAPHY | Facility: CLINIC | Age: 49
End: 2019-10-16
Attending: NURSE PRACTITIONER
Payer: COMMERCIAL

## 2019-10-16 DIAGNOSIS — Z12.31 SCREENING MAMMOGRAM FOR HIGH-RISK PATIENT: ICD-10-CM

## 2019-10-29 ENCOUNTER — TRANSFERRED RECORDS (OUTPATIENT)
Dept: HEALTH INFORMATION MANAGEMENT | Facility: CLINIC | Age: 49
End: 2019-10-29

## 2019-10-30 ENCOUNTER — OFFICE VISIT (OUTPATIENT)
Dept: URGENT CARE | Facility: URGENT CARE | Age: 49
End: 2019-10-30
Payer: OTHER MISCELLANEOUS

## 2019-10-30 VITALS
DIASTOLIC BLOOD PRESSURE: 70 MMHG | HEART RATE: 92 BPM | BODY MASS INDEX: 27 KG/M2 | TEMPERATURE: 98.4 F | OXYGEN SATURATION: 97 % | SYSTOLIC BLOOD PRESSURE: 120 MMHG | HEIGHT: 66 IN | WEIGHT: 168 LBS
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
## 2019-10-30 DIAGNOSIS — R68.84 JAW PAIN: ICD-10-CM

## 2019-10-30 DIAGNOSIS — M25.512 ACUTE PAIN OF LEFT SHOULDER: ICD-10-CM

## 2019-10-30 DIAGNOSIS — M79.661 PAIN OF RIGHT LOWER LEG: ICD-10-CM

## 2019-10-30 DIAGNOSIS — M25.551 HIP PAIN, RIGHT: ICD-10-CM

## 2019-10-30 DIAGNOSIS — M54.2 NECK PAIN: ICD-10-CM

## 2019-10-30 DIAGNOSIS — G44.319 ACUTE POST-TRAUMATIC HEADACHE, NOT INTRACTABLE: ICD-10-CM

## 2019-10-30 DIAGNOSIS — M62.830 SPASM OF BACK MUSCLES: ICD-10-CM

## 2019-10-30 PROCEDURE — 99214 OFFICE O/P EST MOD 30 MIN: CPT | Performed by: INTERNAL MEDICINE

## 2019-10-30 RX ORDER — CYCLOBENZAPRINE HCL 10 MG
10 TABLET ORAL 3 TIMES DAILY PRN
Qty: 10 TABLET | Refills: 0 | Status: SHIPPED | OUTPATIENT
Start: 2019-10-30 | End: 2019-10-30

## 2019-10-30 RX ORDER — CYCLOBENZAPRINE HCL 10 MG
10 TABLET ORAL 3 TIMES DAILY PRN
Qty: 10 TABLET | Refills: 0 | Status: SHIPPED | OUTPATIENT
Start: 2019-10-30 | End: 2019-11-19

## 2019-10-30 ASSESSMENT — ENCOUNTER SYMPTOMS
NAUSEA: 0
LIGHT-HEADEDNESS: 0
CONFUSION: 0
NUMBNESS: 0
WOUND: 0
ABDOMINAL PAIN: 0
WEAKNESS: 0
BRUISES/BLEEDS EASILY: 0
HEADACHES: 1

## 2019-10-30 ASSESSMENT — MIFFLIN-ST. JEOR: SCORE: 1398.79

## 2019-10-30 NOTE — LETTER
[facility]  [address] [facility] [address] [address]  Phone: [phone]    October 30, 2019        Kary Perry  [address] [address]          To whom it may concern:    RE: Kary Perry    Patient was seen and treated today at our clinic.  Please excuse up 2 days work absence this week.    Please contact me for questions or concerns.      Sincerely,        Rika Bassett MD
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
## 2019-11-04 ENCOUNTER — HEALTH MAINTENANCE LETTER (OUTPATIENT)
Age: 49
End: 2019-11-04

## 2019-11-05 ENCOUNTER — HOSPITAL ENCOUNTER (OUTPATIENT)
Facility: CLINIC | Age: 49
End: 2019-11-05
Attending: PLASTIC SURGERY | Admitting: PLASTIC SURGERY
Payer: COMMERCIAL

## 2019-11-19 ENCOUNTER — OFFICE VISIT (OUTPATIENT)
Dept: FAMILY MEDICINE | Facility: CLINIC | Age: 49
End: 2019-11-19
Payer: OTHER MISCELLANEOUS

## 2019-11-19 VITALS
HEIGHT: 66 IN | SYSTOLIC BLOOD PRESSURE: 92 MMHG | RESPIRATION RATE: 14 BRPM | BODY MASS INDEX: 27 KG/M2 | WEIGHT: 168 LBS | HEART RATE: 80 BPM | TEMPERATURE: 98.5 F | DIASTOLIC BLOOD PRESSURE: 62 MMHG

## 2019-11-19 DIAGNOSIS — R68.84 JAW PAIN: ICD-10-CM

## 2019-11-19 DIAGNOSIS — M54.2 ACUTE NECK PAIN: Primary | ICD-10-CM

## 2019-11-19 DIAGNOSIS — M62.830 SPASM OF BACK MUSCLES: ICD-10-CM

## 2019-11-19 DIAGNOSIS — M25.551 HIP PAIN, RIGHT: ICD-10-CM

## 2019-11-19 PROCEDURE — 99214 OFFICE O/P EST MOD 30 MIN: CPT | Performed by: NURSE PRACTITIONER

## 2019-11-19 RX ORDER — CYCLOBENZAPRINE HCL 10 MG
5 TABLET ORAL 3 TIMES DAILY PRN
Qty: 30 TABLET | Refills: 0 | Status: SHIPPED | OUTPATIENT
Start: 2019-11-19 | End: 2021-02-16

## 2019-11-19 ASSESSMENT — MIFFLIN-ST. JEOR: SCORE: 1398.79
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
## 2019-12-04 ENCOUNTER — OFFICE VISIT (OUTPATIENT)
Dept: FAMILY MEDICINE | Facility: CLINIC | Age: 49
End: 2019-12-04
Payer: COMMERCIAL

## 2019-12-04 VITALS
OXYGEN SATURATION: 96 % | BODY MASS INDEX: 26.36 KG/M2 | HEART RATE: 95 BPM | WEIGHT: 164 LBS | DIASTOLIC BLOOD PRESSURE: 70 MMHG | HEIGHT: 66 IN | RESPIRATION RATE: 16 BRPM | SYSTOLIC BLOOD PRESSURE: 132 MMHG | TEMPERATURE: 97.8 F

## 2019-12-04 DIAGNOSIS — Z01.818 PREOP GENERAL PHYSICAL EXAM: Primary | ICD-10-CM

## 2019-12-04 DIAGNOSIS — N62 LARGE BREASTS: ICD-10-CM

## 2019-12-04 PROCEDURE — 99214 OFFICE O/P EST MOD 30 MIN: CPT | Performed by: NURSE PRACTITIONER

## 2019-12-04 ASSESSMENT — MIFFLIN-ST. JEOR: SCORE: 1380.65

## 2019-12-04 NOTE — PROGRESS NOTES
[facility] [address] [place] [address]  [phone]  Dept: [phone]    PRE-OP EVALUATION:  Today's date: 2019    Kary Perry (: 1970) presents for pre-operative evaluation assessment as requested by Dr. Barton.  She requires evaluation and anesthesia risk assessment prior to undergoing surgery/procedure of a bilateral Breast Reduction.    Primary Physician: Faustina Wright  Type of Anesthesia Anticipated: to be determined    Patient has a Health Care Directive or Living Will:  NO    Preop Questions 2019   Who is doing your surgery? Dr. Barton   What are you having done? breast reduction   Date of Surgery/Procedure: 2019   Facility or Hospital where procedure/surgery will be performed: [facility]   1.  Do you have a history of Heart attack, stroke, stent, coronary bypass surgery, or other heart surgery? No   2.  Do you ever have any pain or discomfort in your chest? No   3.  Do you have a history of  Heart Failure? No   4.   Are you troubled by shortness of breath when:  walking on a level surface, or up a slight hill, or at night? No   5.  Do you currently have a cold, bronchitis or other respiratory infection? No   6.  Do you have a cough, shortness of breath, or wheezing? No   7.  Do you sometimes get pains in the calves of your legs when you walk? No   8. Do you or anyone in your family have previous history of blood clots? No   9.  Do you or does anyone in your family have a serious bleeding problem such as prolonged bleeding following surgeries or cuts? maybe - clotting disorder in paternal grandmother   10. Have you ever had problems with anemia or been told to take iron pills? No   11. Have you had any abnormal blood loss such as black, tarry or bloody stools, or abnormal vaginal bleeding? No   12. Have you ever had a blood transfusion? No   13. Have you or any of your relatives ever had problems with anesthesia? No    14. Do you have sleep apnea, excessive snoring or daytime drowsiness? No   15. Do you have any prosthetic heart valves? No   16. Do you have prosthetic joints? No   17. Is there any chance that you may be pregnant? No         HPI:     HPI related to upcoming procedure:   History of large breasts.  Now planning bilateral breast reduction      See problem list for active medical problems.  Problems all longstanding and stable, except as noted/documented.  See ROS for pertinent symptoms related to these conditions.      MEDICAL HISTORY:     Patient Active Problem List    Diagnosis Date Noted     History of ovarian cyst 11/18/2014     Priority: Medium     Problem list name updated by automated process. Provider to review       Eczema 07/10/2012     Priority: Medium     (Problem list name updated by automated process. Provider to review and confirm.)       Neck sprain 05/12/2011     Priority: Medium     Dermatitis 02/03/2011     Priority: Medium     Dysphagia 02/03/2011     Priority: Medium     Vitamin D deficiency 02/03/2011     Priority: Medium     CARDIOVASCULAR SCREENING; LDL GOAL LESS THAN 160 10/31/2010     Priority: Medium     Sprain and strain of shoulder and upper arm 01/27/2010     Priority: Medium     DUB (dysfunctional uterine bleeding) 05/12/2008     Priority: Medium     Endometrial ablation 2008       Herpes simplex virus (HSV) infection 09/29/2004     Priority: Medium     Problem list name updated by automated process. Provider to review       Undiagnosed cardiac murmurs 03/11/2004     Priority: Medium     Other specified disorders of temporomandibular joint 03/11/2004     Priority: Medium     Diagnosis updated by automated process. Provider to review and confirm.       Diffuse cystic mastopathy 03/11/2004     Priority: Medium     1991 had mammogram - left breast more dense        Past Medical History:   Diagnosis Date     Diffuse cystic mastopathy     1991 had mammogram - left breast more dense      Family history of blood disorder     related to a transfusion in her paternal grandmother     FH: non-Hodgkin's lymphoma     Mother 2009     Herpes simplex without mention of complication     herpes labialis     Infectious mononucleosis 1987     Other specified temporomandibular joint disorders      Undiagnosed cardiac murmurs     innocent murmur     Past Surgical History:   Procedure Laterality Date     C NONSPECIFIC PROCEDURE  1993    Tonsillectomy     HYSTEROSCOPY  10/04    D&C/hysteroscopy/polypectomy     HYSTEROSCOPY  5/08    hysteroscopy/polypectomy     HYSTEROSCOPY  11/2/09    hysteroscopy/D&C/endometrial ablation     Current Outpatient Medications   Medication Sig Dispense Refill     acyclovir (ZOVIRAX) 400 MG tablet Take 1 tablet (400 mg) by mouth 3 times daily as needed (cold sore) 20 tablet 1     Calcium 600 MG tablet Take 1 tablet by mouth daily.       Cholecalciferol (VITAMIN D-3) 5000 units TABS Take 1 tablet by mouth daily 90 tablet 3     cyclobenzaprine (FLEXERIL) 10 MG tablet Take 0.5 tablets (5 mg) by mouth 3 times daily as needed for muscle spasms 30 tablet 0     Ibuprofen 200 MG capsule Take 200-600 mg by mouth every 4 hours as needed for fever. 100 capsule 3     MULTIVITAMIN TABS   OR 2 tabs daily       Omega-3 Fatty Acids (OMEGA-3 FISH OIL PO) Take 1,200 mg by mouth daily        scopolamine (TRANSDERM) 1 MG/3DAYS 72 hr patch Apply 1 patch to hairless area behind one ear at least 4 hours before travel.  Remove old patch and change every 3 days (72 hours). 5 patch 0     VITAMIN B COMPLEX PO TABS 2,000 mg daily       OTC products: None, except as noted above    Allergies   Allergen Reactions     Sulfa Drugs Hives      Latex Allergy: NO    Social History     Tobacco Use     Smoking status: Never Smoker     Smokeless tobacco: Never Used   Substance Use Topics     Alcohol use: Yes     Comment: 3 drinks per week     History   Drug Use No       REVIEW OF SYSTEMS:   CONSTITUTIONAL: NEGATIVE for fever,  "chills, change in weight  INTEGUMENTARY/SKIN: NEGATIVE for worrisome rashes  EYES: NEGATIVE for vision changes or irritation  ENT/MOUTH: NEGATIVE for ear, mouth and throat problems  RESP: NEGATIVE for significant cough or SOB  BREAST: NEGATIVE for masses, tenderness or discharge. POSTIVE for large breasts  CV: NEGATIVE for chest pain, palpitations or peripheral edema  GI: NEGATIVE for nausea, abdominal pain, heartburn, or change in bowel habits  : NEGATIVE for frequency, dysuria, or hematuria  MUSCULOSKELETAL: NEGATIVE for significant arthralgias or myalgia  NEURO: NEGATIVE for weakness, dizziness or paresthesias  ENDOCRINE: NEGATIVE for temperature intolerance, skin/hair changes  HEME: NEGATIVE for bleeding or bruising problems  PSYCHIATRIC: NEGATIVE for changes in mood or affect    EXAM:   /70 (BP Location: Left arm, Patient Position: Sitting, Cuff Size: Adult Regular)   Pulse 95   Temp 97.8  F (36.6  C) (Tympanic)   Resp 16   Ht 1.676 m (5' 6\")   Wt 74.4 kg (164 lb)   SpO2 96%   BMI 26.47 kg/m      GENERAL APPEARANCE: healthy, alert and no distress     EYES: EOMI, PERRL     HENT: ear canals and TM's normal and nose and mouth without ulcers or lesions     NECK: no adenopathy, no asymmetry, masses, or scars and thyroid normal to palpation     RESP: lungs clear to auscultation - no rales, rhonchi or wheezes     CV: regular rates and rhythm, normal S1 S2, no S3 or S4 and no murmur, click or rub     ABDOMEN:  soft, nontender, no HSM or masses and bowel sounds normal     MS: extremities normal- no gross deformities noted, no evidence of inflammation in joints, FROM in all extremities.     SKIN: no suspicious lesions or rashes     NEURO: Normal strength and tone, sensory exam grossly normal, mentation intact and speech normal     PSYCH: mentation appears normal. and affect normal/bright     LYMPHATICS: No cervical adenopathy    DIAGNOSTICS:   No labs or EKG required for low risk surgery (cataract, skin " procedure, breast biopsy, etc)    Recent Labs   Lab Test 10/11/19  0843 05/18/18  0846  04/05/16  0858 03/10/15  0918 12/09/11   HGB 12.8 12.9   < >  --  12.8  --    NA  --   --   --   --  139  --    POTASSIUM  --   --   --   --  4.0 4.5   CR  --   --   --   --  0.63 0.68   A1C  --   --   --  5.7  --   --     < > = values in this interval not displayed.     IMPRESSION:   (Z01.818) Preop general physical exam  (primary encounter diagnosis)  Comment:   Plan: cleared for surgery    (N62) Large breasts  Comment:   Plan:         The proposed surgical procedure is considered LOW risk.    REVISED CARDIAC RISK INDEX  The patient has the following serious cardiovascular risks for perioperative complications such as (MI, PE, VFib and 3  AV Block):  No serious cardiac risks  INTERPRETATION: 0 risks: Class I (very low risk - 0.4% complication rate)    The patient has the following additional risks for perioperative complications:  No identified additional risks      ICD-10-CM    1. Preop general physical exam Z01.818    2. Large breasts N62        RECOMMENDATIONS:     --Consult hospital rounder / IM to assist post-op medical management    --meds:  Stop omega's one week before surgery.   No ibuprofen one week before surgery.  No flexeril 24 hours before surgery.     APPROVAL GIVEN to proceed with proposed procedure, without further diagnostic evaluation       Signed Electronically by: RONY Ramirez CNP    Copy of this evaluation report is provided to requesting physician.    [facility] Preop Guidelines    Revised Cardiac Risk Index

## 2019-12-05 ENCOUNTER — THERAPY VISIT (OUTPATIENT)
Dept: PHYSICAL THERAPY | Facility: CLINIC | Age: 49
End: 2019-12-05
Payer: OTHER MISCELLANEOUS

## 2019-12-05 DIAGNOSIS — M54.50 RIGHT LOW BACK PAIN: Primary | ICD-10-CM

## 2019-12-05 DIAGNOSIS — M79.671 RIGHT FOOT PAIN: ICD-10-CM

## 2019-12-05 PROCEDURE — 97110 THERAPEUTIC EXERCISES: CPT | Mod: GP | Performed by: PHYSICAL THERAPIST

## 2019-12-05 PROCEDURE — 97161 PT EVAL LOW COMPLEX 20 MIN: CPT | Mod: GP | Performed by: PHYSICAL THERAPIST
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
## 2019-12-06 ENCOUNTER — THERAPY VISIT (OUTPATIENT)
Dept: PHYSICAL THERAPY | Facility: CLINIC | Age: 49
End: 2019-12-06
Attending: NURSE PRACTITIONER
Payer: OTHER MISCELLANEOUS

## 2019-12-06 DIAGNOSIS — G44.209 TENSION HEADACHE: ICD-10-CM

## 2019-12-06 DIAGNOSIS — M26.609 TEMPOROMANDIBULAR DYSFUNCTION SYNDROME: ICD-10-CM

## 2019-12-06 DIAGNOSIS — M62.830 SPASM OF BACK MUSCLES: ICD-10-CM

## 2019-12-06 DIAGNOSIS — S16.1XXD STRAIN OF NECK MUSCLE, SUBSEQUENT ENCOUNTER: ICD-10-CM

## 2019-12-06 DIAGNOSIS — M54.2 ACUTE NECK PAIN: ICD-10-CM

## 2019-12-06 DIAGNOSIS — M25.551 HIP PAIN, RIGHT: ICD-10-CM

## 2019-12-06 DIAGNOSIS — R68.84 JAW PAIN: ICD-10-CM

## 2019-12-06 PROBLEM — S16.1XXA CERVICAL STRAIN: Status: ACTIVE | Noted: 2019-12-06

## 2019-12-06 PROCEDURE — 97110 THERAPEUTIC EXERCISES: CPT | Mod: GP | Performed by: PHYSICAL THERAPIST

## 2019-12-06 PROCEDURE — 97140 MANUAL THERAPY 1/> REGIONS: CPT | Mod: GP | Performed by: PHYSICAL THERAPIST

## 2019-12-06 PROCEDURE — 97161 PT EVAL LOW COMPLEX 20 MIN: CPT | Mod: GP | Performed by: PHYSICAL THERAPIST
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
## 2019-12-07 ENCOUNTER — OFFICE VISIT (OUTPATIENT)
Dept: URGENT CARE | Facility: URGENT CARE | Age: 49
End: 2019-12-07
Payer: COMMERCIAL

## 2019-12-07 ENCOUNTER — NURSE TRIAGE (OUTPATIENT)
Dept: NURSING | Facility: CLINIC | Age: 49
End: 2019-12-07

## 2019-12-07 VITALS
BODY MASS INDEX: 26.36 KG/M2 | DIASTOLIC BLOOD PRESSURE: 70 MMHG | HEART RATE: 54 BPM | SYSTOLIC BLOOD PRESSURE: 125 MMHG | OXYGEN SATURATION: 100 % | HEIGHT: 66 IN | WEIGHT: 164 LBS | TEMPERATURE: 99.5 F

## 2019-12-07 DIAGNOSIS — R50.9 FEVER, UNSPECIFIED: Primary | ICD-10-CM

## 2019-12-07 DIAGNOSIS — J06.9 VIRAL UPPER RESPIRATORY TRACT INFECTION: ICD-10-CM

## 2019-12-07 LAB
FLUAV+FLUBV AG SPEC QL: NEGATIVE
FLUAV+FLUBV AG SPEC QL: NEGATIVE
SPECIMEN SOURCE: NORMAL

## 2019-12-07 PROCEDURE — 99213 OFFICE O/P EST LOW 20 MIN: CPT | Performed by: NURSE PRACTITIONER

## 2019-12-07 PROCEDURE — 87804 INFLUENZA ASSAY W/OPTIC: CPT | Performed by: FAMILY MEDICINE

## 2019-12-07 RX ORDER — OSELTAMIVIR PHOSPHATE 75 MG/1
75 CAPSULE ORAL 2 TIMES DAILY
Qty: 10 CAPSULE | Refills: 0 | Status: SHIPPED | OUTPATIENT
Start: 2019-12-07 | End: 2019-12-12

## 2019-12-07 ASSESSMENT — MIFFLIN-ST. JEOR: SCORE: 1380.65

## 2019-12-08 NOTE — PATIENT INSTRUCTIONS
Patient Education     Influenza (Adult)    Influenza is also called the flu. It is a viral illness that affects the air passages of your lungs. It is different from the common cold. The flu can easily be passed from one to person to another. It may be spread through the air by coughing and sneezing. Or it can be spread by touching the sick person and then touching your own eyes, nose, or mouth.  The flu starts 1 to 3 days after you are exposed to the flu virus. It may last for 1 to 2 weeks but many people feel tired or fatigued for many weeks afterward. You usually don t need to take antibiotics unless you have a complication. This might be an ear or sinus infection or pneumonia.  Symptoms of the flu may be mild or severe. They can include extreme tiredness (wanting to stay in bed all day), chills, fevers, muscle aches, soreness with eye movement, headache, and a dry, hacking cough.  Home care  Follow these guidelines when caring for yourself at home:    Avoid being around cigarette smoke, whether yours or other people s.    Acetaminophen or ibuprofen will help ease your fever, muscle aches, and headache. Don t give aspirin to anyone younger than 18 who has the flu. Aspirin can harm the liver.    Nausea and loss of appetite are common with the flu. Eat light meals. Drink 6 to 8 glasses of liquids every day. Good choices are water, sport drinks, soft drinks without caffeine, juices, tea, and soup. Extra fluids will also help loosen secretions in your nose and lungs.    Over-the-counter cold medicines will not make the flu go away faster. But the medicines may help with coughing, sore throat, and congestion in your nose and sinuses. Don t use a decongestant if you have high blood pressure.    Stay home until your fever has been gone for at least 24 hours without using medicine to reduce fever.  Follow-up care  Follow up with your healthcare provider, or as advised, if you are not getting better over the next  week.  If you are age 65 or older, talk with your provider about getting a pneumococcal vaccine every 5 years. You should also get this vaccine if you have chronic asthma or COPD. All adults should get a flu vaccine every fall. Ask your provider about this.  When to seek medical advice  Call your healthcare provider right away if any of these occur:    Cough with lots of colored mucus (sputum) or blood in your mucus    Chest pain, shortness of breath, wheezing, or trouble breathing    Severe headache, or face, neck, or ear pain    New rash with fever    Fever of 100.4 F (38 C) or higher, or as directed by your healthcare provider    Confusion, behavior change, or seizure    Severe weakness or dizziness    You get a new fever or cough after getting better for a few days  Date Last Reviewed: 1/1/2017 2000-2018 [employer]. [address] [place] [address], [place] [address]. All rights reserved. This information is not intended as a substitute for professional medical care. Always follow your healthcare professional's instructions.

## 2019-12-08 NOTE — PROGRESS NOTES
HPI  Kary Perry 49 year old presents to the urgent care with acute onset of fever, body aches, headache, sore throat, nasal congestion and dry cough.  Symptoms started approximately 12 hours ago.  She has taken homeopathic medicine and did not get any relief.  She does note that she is having a breast reduction in approximately 2 weeks and is somewhat concerned about this being influenza.    ROS  Past Medical History:   Diagnosis Date     Diffuse cystic mastopathy     1991 had mammogram - left breast more dense     Family history of blood disorder     related to a transfusion in her paternal grandmother     FH: non-Hodgkin's lymphoma     Mother 2009     Herpes simplex without mention of complication     herpes labialis     Infectious mononucleosis 1987     Other specified temporomandibular joint disorders      Undiagnosed cardiac murmurs     innocent murmur     Patient Active Problem List   Diagnosis     Undiagnosed cardiac murmurs     Temporomandibular dysfunction syndrome     Diffuse cystic mastopathy     Herpes simplex virus (HSV) infection     DUB (dysfunctional uterine bleeding)     CARDIOVASCULAR SCREENING; LDL GOAL LESS THAN 160     Dermatitis     Dysphagia     Vitamin D deficiency     Neck sprain     Eczema     History of ovarian cyst     Right low back pain     Right foot pain     Cervical strain     Tension headache      Past Surgical History:   Procedure Laterality Date     C NONSPECIFIC PROCEDURE  1993    Tonsillectomy     HYSTEROSCOPY  10/04    D&C/hysteroscopy/polypectomy     HYSTEROSCOPY  5/08    hysteroscopy/polypectomy     HYSTEROSCOPY  11/2/09    hysteroscopy/D&C/endometrial ablation     Allergies   Allergen Reactions     Sulfa Drugs Hives     Current Outpatient Medications   Medication     acyclovir (ZOVIRAX) 400 MG tablet     Calcium 600 MG tablet     Cholecalciferol (VITAMIN D-3) 5000 units TABS     cyclobenzaprine (FLEXERIL) 10 MG tablet     Ibuprofen 200 MG capsule      "MULTIVITAMIN TABS   OR     Omega-3 Fatty Acids (OMEGA-3 FISH OIL PO)     oseltamivir (TAMIFLU) 75 MG capsule     scopolamine (TRANSDERM) 1 MG/3DAYS 72 hr patch     VITAMIN B COMPLEX PO TABS     Current Facility-Administered Medications   Medication     acetaminophen (TYLENOL) Suppository 325 mg         Physical Exam  Vitals signs and nursing note reviewed.   Constitutional:       General: She is not in acute distress.     Appearance: Normal appearance. She is not toxic-appearing.      Comments: /70   Pulse 54   Temp 99.5  F (37.5  C) (Oral)   Ht 1.676 m (5' 6\")   Wt 74.4 kg (164 lb)   SpO2 100%   BMI 26.47 kg/m       HENT:      Head: Normocephalic.      Nose: Rhinorrhea present.      Mouth/Throat:      Mouth: Mucous membranes are moist.      Pharynx: No posterior oropharyngeal erythema.   Eyes:      Conjunctiva/sclera: Conjunctivae normal.   Cardiovascular:      Rate and Rhythm: Normal rate.   Pulmonary:      Effort: Pulmonary effort is normal.   Skin:     General: Skin is warm and dry.   Neurological:      Mental Status: She is alert and oriented to person, place, and time.       Results for orders placed or performed in visit on 12/07/19   Influenza A/B antigen     Status: None   Result Value Ref Range    Influenza A/B Agn Specimen Nasopharyngeal     Influenza A Negative NEG^Negative    Influenza B Negative NEG^Negative     Assessment:  1. Fever, unspecified    2. Viral upper respiratory tract infection        Plan:  Orders Placed This Encounter     acetaminophen (TYLENOL) Suppository 325 mg     oseltamivir (TAMIFLU) 75 MG capsule   Tylenol or Ibuprofen as directed on package for pain or fever  Cepacol, robitussin OTC  Instructions regarding self-care of patient/child reviewed.   Written instructions provided in after visit summary and reviewed.  Patient instructed to see primary care provider for new or persistent symptoms.   Red flag symptoms reviewed and patient has been instructed to seek emergent " care  Please contact pharmacy for medication questions.  Patient instructed to take medications as directed on package.      Sarah Eldridge, DNP, APRN, CNP

## 2019-12-08 NOTE — TELEPHONE ENCOUNTER
"\"I am having surgery on 12/18 and I think I have the flu. I woke up with fever, body aches, chills, cold and cough sx. I am wondering if I can go to  to be tested?\" Advised [facility]  Vi Banegas RN [facility]      "

## 2019-12-09 RX ORDER — ACETAMINOPHEN 500 MG
1000 TABLET ORAL ONCE
Status: CANCELLED | OUTPATIENT
Start: 2019-12-09 | End: 2019-12-09

## 2019-12-09 RX ORDER — CEFAZOLIN SODIUM 2 G/100ML
2 INJECTION, SOLUTION INTRAVENOUS
Status: CANCELLED | OUTPATIENT
Start: 2019-12-09

## 2019-12-09 RX ORDER — CEFAZOLIN SODIUM 1 G/3ML
1 INJECTION, POWDER, FOR SOLUTION INTRAMUSCULAR; INTRAVENOUS SEE ADMIN INSTRUCTIONS
Status: CANCELLED | OUTPATIENT
Start: 2019-12-09

## 2019-12-13 ENCOUNTER — THERAPY VISIT (OUTPATIENT)
Dept: PHYSICAL THERAPY | Facility: CLINIC | Age: 49
End: 2019-12-13
Payer: OTHER MISCELLANEOUS

## 2019-12-13 DIAGNOSIS — M26.609 TEMPOROMANDIBULAR DYSFUNCTION SYNDROME: ICD-10-CM

## 2019-12-13 DIAGNOSIS — S16.1XXA CERVICAL STRAIN: ICD-10-CM

## 2019-12-13 DIAGNOSIS — G44.209 TENSION HEADACHE: ICD-10-CM

## 2019-12-13 PROCEDURE — 97110 THERAPEUTIC EXERCISES: CPT | Mod: GP | Performed by: PHYSICAL THERAPIST

## 2019-12-13 PROCEDURE — 97140 MANUAL THERAPY 1/> REGIONS: CPT | Mod: GP | Performed by: PHYSICAL THERAPIST

## 2019-12-13 NOTE — PROGRESS NOTES
SUBJECTIVE  Subjective changes as noted by pt:  Just getting over the flu. Working on relaxing the jaw and it is going better. HA still constant, but has had cold and flu symptoms so hard to assess any change. May have to reschedule breast reduction surgery next week if not feeling better.    Current pain level: 3/10   Changes in function:  none  Adverse reaction to treatment or activity:  None    OBJECTIVE  Changes in objective findings:  Yes,  Minimal to moderate R>L masseter and L temporalis tone. Moderate R temporalis tone. R %, L 80%.  Minimal suboccipital tone. Fair to good scap control and postural awareness.      ASSESSMENT  Kary continues to require intervention to meet STG and LTG's: PT  Patient's symptoms are resolving.  Patient is ready to progress to more complex exercises.  Response to therapy has shown an improvement in  pain level, ROM , flexibility, muscle control and posture  Progress made towards STG/LTG?  None    PLAN  Current treatment program is being advanced to more complex exercises.    PTA/ATC plan:  N/A    Please refer to the daily flowsheet for treatment today, total treatment time and time spent performing 1:1 timed codes.

## 2020-01-02 ENCOUNTER — THERAPY VISIT (OUTPATIENT)
Dept: PHYSICAL THERAPY | Facility: CLINIC | Age: 50
End: 2020-01-02
Payer: OTHER MISCELLANEOUS

## 2020-01-02 DIAGNOSIS — M26.609 TEMPOROMANDIBULAR DYSFUNCTION SYNDROME: ICD-10-CM

## 2020-01-02 DIAGNOSIS — S16.1XXD STRAIN OF NECK MUSCLE, SUBSEQUENT ENCOUNTER: ICD-10-CM

## 2020-01-02 DIAGNOSIS — G44.209 TENSION HEADACHE: ICD-10-CM

## 2020-01-02 PROCEDURE — 97110 THERAPEUTIC EXERCISES: CPT | Mod: GP | Performed by: PHYSICAL THERAPIST

## 2020-01-02 PROCEDURE — 97140 MANUAL THERAPY 1/> REGIONS: CPT | Mod: GP | Performed by: PHYSICAL THERAPIST

## 2020-01-02 NOTE — PROGRESS NOTES
SUBJECTIVE  Subjective changes as noted by pt:   Working with chiropractic and having cranio therapy,    Current pain level:  3/10   Changes in function:  Yes (See Goal flowsheet attached for changes in current functional level)     Adverse reaction to treatment or activity:  None    OBJECTIVE  Changes in objective findings:  Yes,  Minimal R masseter and moderater R temporalis tone. Trace on left.  Moderate R suboccipital and UT tone, trace on L. Fair to good scap control and posture. Prone shoulder extension grade 4+/5 R, 5-/5 L. CROM WNL except L SB 90%.     ASSESSMENT  Kary continues to require intervention to meet STG and LTG's: PT  Patient's symptoms are resolving.  Response to therapy has shown an improvement in  pain level, ROM , flexibility, strength, muscle control, posture and function  Progress made towards STG/LTG?  Yes (See Goal flowsheet attached for updates on achievement of STG and LTG)    PLAN  Continue current treatment plan until patient demonstrates readiness to progress to higher level exercises.    PTA/ATC plan:  N/A    Please refer to the daily flowsheet for treatment today, total treatment time and time spent performing 1:1 timed codes.

## 2020-01-03 ENCOUNTER — THERAPY VISIT (OUTPATIENT)
Dept: PHYSICAL THERAPY | Facility: CLINIC | Age: 50
End: 2020-01-03
Payer: OTHER MISCELLANEOUS

## 2020-01-03 DIAGNOSIS — M79.671 RIGHT FOOT PAIN: ICD-10-CM

## 2020-01-03 DIAGNOSIS — M54.50 RIGHT LOW BACK PAIN: ICD-10-CM

## 2020-01-03 PROCEDURE — 97110 THERAPEUTIC EXERCISES: CPT | Mod: GP | Performed by: PHYSICAL THERAPIST

## 2020-01-03 PROCEDURE — 97112 NEUROMUSCULAR REEDUCATION: CPT | Mod: GP | Performed by: PHYSICAL THERAPIST

## 2020-02-12 PROBLEM — M79.671 RIGHT FOOT PAIN: Status: RESOLVED | Noted: 2019-12-05 | Resolved: 2020-02-12
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
## 2020-02-19 ENCOUNTER — THERAPY VISIT (OUTPATIENT)
Dept: PHYSICAL THERAPY | Facility: CLINIC | Age: 50
End: 2020-02-19
Payer: OTHER MISCELLANEOUS

## 2020-02-19 DIAGNOSIS — M26.609 TEMPOROMANDIBULAR DYSFUNCTION SYNDROME: ICD-10-CM

## 2020-02-19 DIAGNOSIS — G44.209 TENSION HEADACHE: ICD-10-CM

## 2020-02-19 DIAGNOSIS — S16.1XXD STRAIN OF NECK MUSCLE, SUBSEQUENT ENCOUNTER: ICD-10-CM

## 2020-02-19 PROCEDURE — 97110 THERAPEUTIC EXERCISES: CPT | Mod: GP | Performed by: PHYSICAL THERAPIST

## 2020-02-19 PROCEDURE — 97140 MANUAL THERAPY 1/> REGIONS: CPT | Mod: GP | Performed by: PHYSICAL THERAPIST

## 2020-02-19 NOTE — PROGRESS NOTES
SUBJECTIVE  Subjective changes as noted by pt:   Was sick and on vacation. Also was told that further PT not covered by WC at one point, so has not been in since 1/2/20. No cervical pain at rest. Occassional pain with movement or touch.  Intermittent R face tension. Headaches later in the day around 3 p.m. daily.  Some later on nonwork days. Just got new glasses last week and is having some difficulty adjusting to them.    Current pain level:  0/10 neck, 1/10 face  Changes in function:  Yes (See Goal flowsheet attached for changes in current functional level)     Adverse reaction to treatment or activity:  None    OBJECTIVE  Changes in objective findings:  Yes,  CROM WNL except L rotation, SB 90%. Minimal R masseter tone, trace on right. Moderate R temporalis and minimal L tone. Prone shoulder extension grade 5-/5 R, 5/5 L. Minimal R suboccipital and UT tone. Good posture with effort.      ASSESSMENT  Kary continues to require intervention to meet STG and LTG's: PT  Patient's symptoms are resolving.  Response to therapy has shown an improvement in  pain level, ROM , flexibility, strength, muscle control, posture and function  Progress made towards STG/LTG?  Yes (See Goal flowsheet attached for updates on achievement of STG and LTG)    PLAN  Current treatment program is being advanced to more complex exercises.    PTA/ATC plan:  N/A    Please refer to the daily flowsheet for treatment today, total treatment time and time spent performing 1:1 timed codes.

## 2020-02-27 ENCOUNTER — OFFICE VISIT (OUTPATIENT)
Dept: FAMILY MEDICINE | Facility: CLINIC | Age: 50
End: 2020-02-27
Payer: COMMERCIAL

## 2020-02-27 ENCOUNTER — MYC MEDICAL ADVICE (OUTPATIENT)
Dept: FAMILY MEDICINE | Facility: CLINIC | Age: 50
End: 2020-02-27

## 2020-02-27 VITALS
HEIGHT: 65 IN | HEART RATE: 93 BPM | DIASTOLIC BLOOD PRESSURE: 76 MMHG | OXYGEN SATURATION: 98 % | WEIGHT: 169 LBS | TEMPERATURE: 97.9 F | RESPIRATION RATE: 16 BRPM | BODY MASS INDEX: 28.16 KG/M2 | SYSTOLIC BLOOD PRESSURE: 102 MMHG

## 2020-02-27 DIAGNOSIS — B00.1 COLD SORE: ICD-10-CM

## 2020-02-27 DIAGNOSIS — Z01.818 PREOP GENERAL PHYSICAL EXAM: Primary | ICD-10-CM

## 2020-02-27 DIAGNOSIS — N62 LARGE BREASTS: ICD-10-CM

## 2020-02-27 LAB — HGB BLD-MCNC: 12.7 G/DL (ref 11.7–15.7)

## 2020-02-27 PROCEDURE — 99214 OFFICE O/P EST MOD 30 MIN: CPT | Performed by: NURSE PRACTITIONER

## 2020-02-27 PROCEDURE — 36415 COLL VENOUS BLD VENIPUNCTURE: CPT | Performed by: NURSE PRACTITIONER

## 2020-02-27 PROCEDURE — 85018 HEMOGLOBIN: CPT | Performed by: NURSE PRACTITIONER

## 2020-02-27 RX ORDER — ACYCLOVIR 400 MG/1
400 TABLET ORAL 3 TIMES DAILY PRN
Qty: 20 TABLET | Refills: 5 | Status: SHIPPED | OUTPATIENT
Start: 2020-02-27 | End: 2020-09-16 | Stop reason: DRUGHIGH

## 2020-02-27 ASSESSMENT — MIFFLIN-ST. JEOR: SCORE: 1385.71

## 2020-02-27 NOTE — PROGRESS NOTES
[facility] [address] [place] [address]  [phone]  Dept: [phone]    PRE-OP EVALUATION:  Today's date: 2020    Kary Perry (: 1970) presents for pre-operative evaluation assessment as requested by Dr. Barton.  She requires evaluation and anesthesia risk assessment prior to undergoing surgery/procedure for treatment of Breast reduction .    Fax number for surgical facility:   Primary Physician: Faustina Wright  Type of Anesthesia Anticipated: to be determined    Patient has a Health Care Directive or Living Will:  NO    Preop Questions 2020   Who is doing your surgery? dr Barton   What are you having done? breast reduction   Date of Surgery/Procedure: 3/4/2020   Facility or Hospital where procedure/surgery will be performed: [facility]   1.  Do you have a history of Heart attack, stroke, stent, coronary bypass surgery, or other heart surgery? No   2.  Do you ever have any pain or discomfort in your chest? No   3.  Do you have a history of  Heart Failure? No   4.   Are you troubled by shortness of breath when:  walking on a level surface, or up a slight hill, or at night? No   5.  Do you currently have a cold, bronchitis or other respiratory infection? No   6.  Do you have a cough, shortness of breath, or wheezing? No   7.  Do you sometimes get pains in the calves of your legs when you walk? No   8. Do you or anyone in your family have previous history of blood clots? No   9.  Do you or does anyone in your family have a serious bleeding problem such as prolonged bleeding following surgeries or cuts? No   10. Have you ever had problems with anemia or been told to take iron pills? No   11. Have you had any abnormal blood loss such as black, tarry or bloody stools, or abnormal vaginal bleeding? No   12. Have you ever had a blood transfusion? No   13. Have you or any of your relatives ever had problems with anesthesia? No    14. Do you have sleep apnea, excessive snoring or daytime drowsiness? No   15. Do you have any prosthetic heart valves? No   16. Do you have prosthetic joints? No   17. Is there any chance that you may be pregnant? No         HPI:     HPI related to upcoming procedure:   History of large breasts and physical related symptoms.  Now scheduled for reduction.    Cold sores.  Intermittent outbreaks.  Requesting antiviral refill today.       See problem list for active medical problems.  Problems all longstanding and stable, except as noted/documented.  See ROS for pertinent symptoms related to these conditions.      MEDICAL HISTORY:     Patient Active Problem List    Diagnosis Date Noted     Cervical strain 12/06/2019     Priority: Medium     Tension headache 12/06/2019     Priority: Medium     History of ovarian cyst 11/18/2014     Priority: Medium     Problem list name updated by automated process. Provider to review       Eczema 07/10/2012     Priority: Medium     (Problem list name updated by automated process. Provider to review and confirm.)       Neck sprain 05/12/2011     Priority: Medium     Dermatitis 02/03/2011     Priority: Medium     Dysphagia 02/03/2011     Priority: Medium     Vitamin D deficiency 02/03/2011     Priority: Medium     CARDIOVASCULAR SCREENING; LDL GOAL LESS THAN 160 10/31/2010     Priority: Medium     DUB (dysfunctional uterine bleeding) 05/12/2008     Priority: Medium     Endometrial ablation 2008       Herpes simplex virus (HSV) infection 09/29/2004     Priority: Medium     Problem list name updated by automated process. Provider to review       Undiagnosed cardiac murmurs 03/11/2004     Priority: Medium     Temporomandibular dysfunction syndrome 03/11/2004     Priority: Medium     Diagnosis updated by automated process. Provider to review and confirm.       Diffuse cystic mastopathy 03/11/2004     Priority: Medium     1991 had mammogram - left breast more dense        Past Medical  History:   Diagnosis Date     Diffuse cystic mastopathy     1991 had mammogram - left breast more dense     Family history of blood disorder     related to a transfusion in her paternal grandmother     FH: non-Hodgkin's lymphoma     Mother 2009     Herpes simplex without mention of complication     herpes labialis     Infectious mononucleosis 1987     Other specified temporomandibular joint disorders      Undiagnosed cardiac murmurs     innocent murmur     Past Surgical History:   Procedure Laterality Date     C NONSPECIFIC PROCEDURE  1993    Tonsillectomy     HYSTEROSCOPY  10/04    D&C/hysteroscopy/polypectomy     HYSTEROSCOPY  5/08    hysteroscopy/polypectomy     HYSTEROSCOPY  11/2/09    hysteroscopy/D&C/endometrial ablation     Current Outpatient Medications   Medication Sig Dispense Refill     acyclovir (ZOVIRAX) 400 MG tablet Take 1 tablet (400 mg) by mouth 3 times daily as needed (cold sore) For 5 days with each cold sore. 20 tablet 5     Calcium 600 MG tablet Take 1 tablet by mouth daily.       Cholecalciferol (VITAMIN D-3) 5000 units TABS Take 1 tablet by mouth daily 90 tablet 3     MULTIVITAMIN TABS   OR 2 tabs daily       Omega-3 Fatty Acids (OMEGA-3 FISH OIL PO) Take 1,200 mg by mouth daily        VITAMIN B COMPLEX PO TABS 2,000 mg daily       cyclobenzaprine (FLEXERIL) 10 MG tablet Take 0.5 tablets (5 mg) by mouth 3 times daily as needed for muscle spasms (Patient not taking: Reported on 2/27/2020) 30 tablet 0     Ibuprofen 200 MG capsule Take 200-600 mg by mouth every 4 hours as needed for fever. (Patient not taking: Reported on 2/27/2020) 100 capsule 3     scopolamine (TRANSDERM) 1 MG/3DAYS 72 hr patch Apply 1 patch to hairless area behind one ear at least 4 hours before travel.  Remove old patch and change every 3 days (72 hours). (Patient not taking: Reported on 2/27/2020) 5 patch 0     OTC products: None, except as noted above    Allergies   Allergen Reactions     Sulfa Drugs Hives      Latex  "Allergy: NO    Social History     Tobacco Use     Smoking status: Never Smoker     Smokeless tobacco: Never Used   Substance Use Topics     Alcohol use: Yes     Comment: 3 drinks per week     History   Drug Use No       REVIEW OF SYSTEMS:   CONSTITUTIONAL: NEGATIVE for fever, chills, change in weight  INTEGUMENTARY/SKIN: NEGATIVE for worrisome rashes, moles or lesions  EYES: NEGATIVE for vision changes or irritation  ENT/MOUTH: NEGATIVE for ear, mouth and throat problems  RESP: NEGATIVE for significant cough or SOB  BREAST: NEGATIVE for masses, tenderness or discharge  CV: NEGATIVE for chest pain, palpitations or peripheral edema  GI: NEGATIVE for nausea, abdominal pain, heartburn, or change in bowel habits  : NEGATIVE for frequency, dysuria, or hematuria  MUSCULOSKELETAL: NEGATIVE for significant arthralgias or myalgia  NEURO: NEGATIVE for weakness, dizziness or paresthesias  ENDOCRINE: NEGATIVE for temperature intolerance, skin/hair changes  HEME: NEGATIVE for bleeding problems  PSYCHIATRIC: NEGATIVE for changes in mood or affect    EXAM:   /76 (BP Location: Right arm, Patient Position: Sitting, Cuff Size: Adult Regular)   Pulse 93   Temp 97.9  F (36.6  C) (Tympanic)   Resp 16   Ht 1.648 m (5' 4.89\")   Wt 76.7 kg (169 lb)   SpO2 98%   BMI 28.22 kg/m      GENERAL APPEARANCE: healthy, alert and no distress     EYES: EOMI, PERRL     HENT: ear canals and TM's normal and nose and mouth without ulcers or lesions     NECK: no adenopathy, no asymmetry, masses, or scars and thyroid normal to palpation     RESP: lungs clear to auscultation - no rales, rhonchi or wheezes     CV: regular rates and rhythm, normal S1 S2, no S3 or S4 and no murmur, click or rub     ABDOMEN:  soft, nontender, no HSM or masses and bowel sounds normal     MS: extremities normal- no gross deformities noted, no evidence of inflammation in joints, FROM in all extremities.     SKIN: no suspicious lesions or rashes     NEURO: Normal " strength and tone, sensory exam grossly normal, mentation intact and speech normal     PSYCH: mentation appears normal. and affect normal/bright     LYMPHATICS: No cervical adenopathy    DIAGNOSTICS:     Labs Resulted Today:   Results for orders placed or performed in visit on 02/27/20   Hemoglobin     Status: None   Result Value Ref Range    Hemoglobin 12.7 11.7 - 15.7 g/dL       Recent Labs   Lab Test 10/11/19  0843 05/18/18  0846  04/05/16  0858 03/10/15  0918   HGB 12.8 12.9   < >  --  12.8   NA  --   --   --   --  139   POTASSIUM  --   --   --   --  4.0   CR  --   --   --   --  0.63   A1C  --   --   --  5.7  --     < > = values in this interval not displayed.        IMPRESSION:   (Z01.818) Preop general physical exam  (primary encounter diagnosis)  Comment:   Plan: Hemoglobin        Cleared for surgery    (N62) Large breasts  Comment:   Plan: Hemoglobin        Cleared for surgery    (B00.1) Cold sore  Comment:   Plan: acyclovir (ZOVIRAX) 400 MG tablet        Refills given        The proposed surgical procedure is considered LOW risk.    REVISED CARDIAC RISK INDEX  The patient has the following serious cardiovascular risks for perioperative complications such as (MI, PE, VFib and 3  AV Block):  No serious cardiac risks  INTERPRETATION: 0 risks: Class I (very low risk - 0.4% complication rate)    The patient has the following additional risks for perioperative complications:  No identified additional risks      ICD-10-CM    1. Preop general physical exam Z01.818 Hemoglobin   2. Large breasts N62 Hemoglobin   3. Cold sore B00.1 acyclovir (ZOVIRAX) 400 MG tablet       RECOMMENDATIONS:     --Consult hospital rounder / IM to assist post-op medical management    --Patient is on no chronic medications  --she has stopped fish oils now and she is to stop NSAIDs one week before surery    APPROVAL GIVEN to proceed with proposed procedure, without further diagnostic evaluation       Signed Electronically by: Faustina BRIGGS  RONY Wright CNP    Copy of this evaluation report is provided to requesting physician.    [facility] Preop Guidelines    Revised Cardiac Risk Index

## 2020-02-27 NOTE — RESULT ENCOUNTER NOTE
Taran Garcia,    This note is to confirm to you that your hemoglobin/blood count is normal and you are cleared for surgery.  I hope all goes well and taken care of yourself!    Faustina URIBE CNP

## 2020-02-28 NOTE — TELEPHONE ENCOUNTER
Please fax pre op to: [facility]  [address] [place] [address]  [phone]  [place] [id number]    Fax # [phone]    thanks

## 2020-02-28 NOTE — TELEPHONE ENCOUNTER
H&P faxed & patient informed via [employer].    Sarah NAYAK     [facility]

## 2020-02-28 NOTE — TELEPHONE ENCOUNTER
The Acyclovir she has been taking was 800 mg.  The new prescription is now 400 mg. She is ok with the reduced dosage if you are.

## 2020-03-02 ENCOUNTER — MYC MEDICAL ADVICE (OUTPATIENT)
Dept: PHYSICAL THERAPY | Facility: CLINIC | Age: 50
End: 2020-03-02

## 2020-03-04 ENCOUNTER — TRANSFERRED RECORDS (OUTPATIENT)
Dept: HEALTH INFORMATION MANAGEMENT | Facility: CLINIC | Age: 50
End: 2020-03-04

## 2020-03-04 ENCOUNTER — HOSPITAL PATHOLOGY (OUTPATIENT)
Dept: OTHER | Facility: CLINIC | Age: 50
End: 2020-03-04

## 2020-03-06 LAB — COPATH REPORT: NORMAL

## 2020-03-19 ENCOUNTER — TRANSFERRED RECORDS (OUTPATIENT)
Dept: HEALTH INFORMATION MANAGEMENT | Facility: CLINIC | Age: 50
End: 2020-03-19

## 2020-04-06 ENCOUNTER — VIRTUAL VISIT (OUTPATIENT)
Dept: PHYSICAL THERAPY | Facility: CLINIC | Age: 50
End: 2020-04-06
Payer: OTHER MISCELLANEOUS

## 2020-04-06 DIAGNOSIS — M54.50 RIGHT LOW BACK PAIN: Primary | ICD-10-CM

## 2020-04-06 PROCEDURE — 97110 THERAPEUTIC EXERCISES: CPT | Mod: 95 | Performed by: PHYSICAL THERAPIST

## 2020-04-06 PROCEDURE — 97164 PT RE-EVAL EST PLAN CARE: CPT | Mod: 95 | Performed by: PHYSICAL THERAPIST

## 2020-04-06 NOTE — PROGRESS NOTES
"Physical Therapy Virtual Initial Visit      The patient has been notified of following:     \"This virtual visit will be conducted between you and your provider. We have found that certain health care needs can be provided without the need for physical presence.  This service lets us provide the care you need with a virtual visit.\"    Due to external, as well as internal [facility] management of the COVID-19 Virus, Kary Perry was not seen in our clinic.  As a substitution, we implemented a virtual visit to manage this patient's condition utilizing the PTRx virtual visit platform via the patient s existing code.  The provider, Danielle Tran, reviewed the patient's chart, PTRx prescription, and spoke with the patient to determine the following telemedicine visit is appropriate and effective for the patient's care.    The following type of visit was completed:   Video Visit:  The PTRx platform uses a synchronous HIPAA compliant video stream for this patient encounter.         Subjective:                  Objective:    System  Physical Exam  General   ROS    PTRx Content from today's visit:    Exercise Name: Virtual Visit Tips for Patients    Exercise Name: Sitting Flexion - Reps: 10 - Sessions: 6-8, Notes: hold 5-10 seconds  recommend every hour when standing for work  Exercise   Name: Bridging #1, Sets: 2 - Reps: 20-30 weight through your heels - Sessions: every other day, Notes: Focus on squeezing the butt muscles then lifting.  Exercise Name: Supine Abdominal    Exercise #6 (Dead Bug), Sets: 2 - Reps: 10-30 - Sessions: 3-4x/wk, Notes: Only lower arm/leg as far as you can without arching your back.  Focus on deep abdominal muscles, pulling belly button to floor.    Assessment/Plan:     PROGRESS  REPORT    Progress reporting period is from 12/5/2019 to 4/6/2020.       SUBJECTIVE  Subjective changes noted by patient:  Patient underwent breast reduction surgery at the beginning of March 2020. As a " result has not performed her home exercise program in 1 month. Prior to surgery, felt the press ups were making her back worse not better. No problem with strengthening exercises. Since COVID-19, has been working from home and no longer driving for work. As a result her back pain has decreased the R heel pain is infrequent. Has been sitting and standing for work, feels the back pain most while standing.       Current pain level is 2/10  .       Previous pain level was  5/10  .     Changes in function:  None    Adverse reaction to treatment or activity: None    OBJECTIVE  Changes noted in objective findings:  Yes,     AROM Lumbar Flx: floor, Ext: 30% tight L LB    REIL: worse    RFIS: better        ASSESSMENT/PLAN  Updated problem list and treatment plan: Diagnosis 1:  LBP  Pain -  hot/cold therapy, manual therapy, education, directional preference exercise and home program  Decreased ROM/flexibility - manual therapy and therapeutic exercise  Impaired muscle performance - neuro re-education  Decreased function - therapeutic activities  STG/LTGs have been met or progress has been made towards goals:  None  Assessment of Progress: The patient's condition has potential to improve.  The patient has had set backs in their progress.  Self Management Plans:  Patient has been instructed in a home treatment program.  I have re-evaluated this patient and find that the nature, scope, duration and intensity of the therapy is appropriate for the medical condition of the patient.  Kary continues to require the following intervention to meet STG and LTG's:  PT    Recommendations:  This patient would benefit from continued therapy.     Frequency:  1 X week, once daily  Duration:  for 4 weeks        Please refer to the daily flowsheet for treatment today, total treatment time and time spent performing 1:1 timed codes.            Virtual visit contact time    Time of service began: 3:50 PM  Time of service ended: 4:25 PM  Total  Time for set up, visit, and documentation: 40 minutes    Payor: WORK COMP / Plan: [employer] / Product Type: *No Product type* /     Procedure Code/s   Therapeutic Exercise (00135): 15 minutes  Re-Evaluation: 20 minutes    I have reviewed the note as documented above.  This accurately captures the substance of my conversation with the patient.  Provider location: [place], [place] ([facility])  Patient location: home    ___________________________________________________    Any subjective info about the quality of the visit, ease of use:   Patient's opinion about reasonable cost for this visit

## 2020-04-17 ENCOUNTER — VIRTUAL VISIT (OUTPATIENT)
Dept: PHYSICAL THERAPY | Facility: CLINIC | Age: 50
End: 2020-04-17
Payer: OTHER MISCELLANEOUS

## 2020-04-17 DIAGNOSIS — M54.50 RIGHT LOW BACK PAIN: ICD-10-CM

## 2020-04-17 PROCEDURE — 97110 THERAPEUTIC EXERCISES: CPT | Mod: 95 | Performed by: PHYSICAL THERAPIST

## 2020-04-17 NOTE — PROGRESS NOTES
"Physical Therapy Virtual Follow Up Visit      The patient has been notified of following:     \"This virtual visit will be conducted between you and your provider. We have found that certain health care needs can be provided without the need for physical presence.  This service lets us provide the care you need with a virtual visit.\"    Due to external, as well as internal [facility] management of the COVID-19 Virus, Kary Perry was not seen in our clinic.  As a substitution, we implemented a virtual visit to manage this patient's condition utilizing the [employer] virtual visit platform via the patient s existing code.  The provider, Danielle Tran, reviewed the patient's chart, PTRx prescription, and spoke with the patient to determine the following telemedicine visit is appropriate and effective for the patient's care.    The following type of visit was completed:   Video Visit:  The [employer] platform uses a synchronous HIPAA compliant video stream for this patient encounter.        S: Kary Perry is a 49 year old female. Connected virtually on the PTRx platform to discuss their condition/progress.     They noted improvements in sitting and standing tolerance, back pain.      They noted ongoing pain or limitations with R shin pain, continues to report pain with prolonged driving and standing.       Worked on the exercises, noticed over the course of the week the repeated flexion stretch also caused an increase in the pain, stopped performing this a few days ago. Strengthening exercises felt fine, no pain with those. Has noticed some pain and tingling in the R lateral shin, particularly when sitting. Standing is getting better.    Current pain level: No change      O: Patient demonstrated Sitting posure: rounded lumbar spine, feet tucked under chair, knees flexed at 70*   System  Physical Exam  General   ROS  PTRx Content from today's visit:  Exercise Name: Virtual Visit Tips for " "Patients    Exercise Name: Bridging #1, Sets: 2 - Reps: 20-30 weight through your heels - Sessions: every other day (do bridge #2a, unless that causes pain), Notes: Focus on squeezing the butt muscles then lifting.    Exercise Name: Bridging #2A Weight Shift, Sets: 1-2 - Reps: 10-30 - Sessions: every other day (this or Bridge #1), Notes: Focus on squeezing the butt muscles then lifting.  Don't let your hips drop side to side  weight through your heels  If this causes pain, go back to bridge #1  Exercise Name: Supine Abdominal    Exercise #6 (Dead Bug), Sets: 2 - Reps: 10-30 - Sessions: every other day (do supine abdominal exercise #9B unless that causes pain), Notes: Only lower arm/leg as far as you can without arching your back.  Focus on deep abdominal muscles, pulling belly button to floor.    Exercise Name: Supine Abdominal Exercise #9B (Alternate Arm and Leg Extension With Feet Off the Floor), Sets: 2 - Reps: 10-30 - Sessions: every other day, Notes: Only lower arm/leg as far as you can without arching your back.  If this causes pain, return to supine abdominal #6    Exercise Name: All 4s Leg Lift, Sets: 2 - Reps: 10-20 - Sessions: every other day, Notes: keep your back flat, don't let your hips twist  pull your stomach to the ceiling    Exercise Name: Neutral Spine Slouch Overcorrect, Notes: not an exercise - just a reminder to sit with good posture  lumbar roll - problem solved home office set up - made recommendations on modifications for proper sitting posture    A:   Patient has experienced an exacerbation of symptoms as a result of repeated flexion. Feel patient would respond best to repeated extension; however, patient felt this was too painful. Would rather focus on strengthening exercises. Patient is not convinced R shin pain is radicular in nature, feels it is a \"shin splint\".     Response to therapy has shown an improvement in  pain level and function  Response to therapy has shown a worsening of  " radicular symptoms      P: Patient will continue with the exercise program assigned on their PTRx code and will add the following measures to manage their pain/condition: removed repeated motion exercises, progressed core stabilization.     Current treatment program is being advanced to more complex exercises.      Virtual visit contact time    Time of service began: 11:39 AM  Time of service ended: 12:08 PM  Total Time for set up, visit, and documentation: 29 minutes    Payor: WORK COMP / Plan:  [employer] / Product Type: *No Product type* /   .  Procedure Code/s   Therapeutic Exercise (39853): 24 minutes  Therapeutic Activities (24210): 5 minutes    I have reviewed the note as documented above.  This accurately captures the substance of my conversation with the patient.  Provider location: [place], [place] ([facility])  Patient location: home

## 2020-04-29 ENCOUNTER — MYC MEDICAL ADVICE (OUTPATIENT)
Dept: PHYSICAL THERAPY | Facility: CLINIC | Age: 50
End: 2020-04-29

## 2020-05-01 NOTE — TELEPHONE ENCOUNTER
Monica Garcia Eleanor wanted me to reach out to let you know that she is furloughed and does not have a return date for coming back.  I was wondering how you are doing and if you would like to discuss if you should come into the clinic or be seen virtual. Our [facility] is open and I have other providers who can see you virtually.  Please let me know if I can help.  Thanks,  Bertha Harpre PTA, Clinic Supervisor  RA[facility] Hedrick  [phone]

## 2020-05-14 ENCOUNTER — VIRTUAL VISIT (OUTPATIENT)
Dept: PHYSICAL THERAPY | Facility: CLINIC | Age: 50
End: 2020-05-14
Payer: OTHER MISCELLANEOUS

## 2020-05-14 DIAGNOSIS — M54.50 RIGHT LOW BACK PAIN: ICD-10-CM

## 2020-05-14 PROCEDURE — 97110 THERAPEUTIC EXERCISES: CPT | Mod: 95 | Performed by: PHYSICAL THERAPIST

## 2020-05-14 PROCEDURE — 97112 NEUROMUSCULAR REEDUCATION: CPT | Mod: 95 | Performed by: PHYSICAL THERAPIST

## 2020-05-14 NOTE — PROGRESS NOTES
"Physical Therapy Virtual Follow Up Visit      The patient has been notified of following:     \"This virtual visit will be conducted between you and your provider. We have found that certain health care needs can be provided without the need for physical presence.  This service lets us provide the care you need with a virtual visit.\"    Due to external, as well as internal [facility] management of the COVID-19 Virus, Kary Perry was not seen in our clinic.  As a substitution, we implemented a virtual visit to manage this patient's condition utilizing the [employer] virtual visit platform via the patient s existing code.  The provider, Danielle Tran, reviewed the patient's chart, PTRx prescription, and spoke with the patient to determine the following telemedicine visit is appropriate and effective for the patient's care.    The following type of visit was completed:   Video Visit:  The [employer] platform uses a synchronous HIPAA compliant video stream for this patient encounter.        S: Kary Perry is a 49 year old female. Connected virtually on the PTRx platform to discuss their condition/progress.     They noted improvements in R shin pain has resolved. Has been able to progress back to 10k steps per day. Has also been cycling 5-6 times per week. Exercises are going well, but are challenging. Doing 10 reps of \"level 2\", supplemented with \"level 1\". Forgot about the all 4s exercise.    Back has been feeling \"different\", the pain is now more diffuse and less intense on the L LB. Has repositioned her home office for better sitting posture.    They noted ongoing pain or limitations with sitting with flexed forward posture (15-20 minutes), sitting with proper posture (45 min) and prolonged standing (60 min).        Current pain level: 0/10 at rest.    O: Patient demonstrated modified prone plank: 10 sec     PTRx Content from today's visit:    Exercise Name: Bridging #1, Sets: 2 - Reps: 20-30 weight " through your heels - Sessions: every other day (do bridge #2a, unless that causes pain), Notes: Focus on squeezing the butt muscles then lifting.    Exercise Name: Bridging #2A Weight Shift, Sets: 1-2 - Reps: 10-30 - Sessions: every other day (this or Bridge #1), Notes: Focus on squeezing the butt muscles then lifting.  Don't let your hips drop side to side  weight through your heels  If this causes pain, go back to bridge #1    Exercise Name: Supine Abdominal Exercise #6 (Dead Bug), Sets: 2 - Reps: 10-30 - Sessions: every other day (do supine abdominal exercise #9B unless that causes pain), Notes: Only lower arm/leg as far as you can without arching your back.  Focus on deep abdominal muscles, pulling belly button to floor.    Exercise Name: Supine Abdominal Exercise #9B (Alternate Arm and Leg Extension With Feet Off the Floor), Sets: 2 - Reps: 10-30 - Sessions: every other day, Notes: Only lower arm/leg as far as you can without arching your back.  If this causes pain, return to supine abdominal #6    Exercise Name: Prone Plank Modified Knees - Reps: 1-3 - Sessions: 2-3 days per week, Notes: goal 30 seconds  if this hurts, stop!    Exercise Name: All 4s Leg Lift, Sets: 2 - Reps: 10-20 - Sessions: every other day, Notes: keep your back flat, don't let your hips twist  pull your stomach to the ceiling  could use a stick for feedback    Reviewed:   Exercise Name: Neutral Spine Slouch Overcorrect, Notes: not an exercise - just a reminder to sit with good posture  lumbar roll  Exercise Name: Body Mechanics - [place]  Exercise Name: Body Mechanics - Full Squat    A:   Patient is progressing slow then expected.  Response to therapy has shown an improvement in  pain level, radicular symptoms, muscle control and function      P: Patient will continue with the exercise program assigned on their PTRx code and will add the following measures to manage their pain/condition: added modified prone plank.     Current treatment  program is being advanced to more complex exercises.      Virtual visit contact time    Time of service began: 10:58 AM  Time of service ended: 11:29 AM  Total Time for set up, visit, and documentation: 35 minutes    Payor: [employer] / Plan: [employer] / Product Type: *No Product type* /   .  Procedure Code/s   Therapeutic Exercise (70243): 23 minutes  Neuromuscular Re-education (03053): 8 minutes    I have reviewed the note as documented above.  This accurately captures the substance of my conversation with the patient.  Provider location: [place], [place] ([facility])  Patient location: home

## 2020-06-02 ENCOUNTER — HOSPITAL ENCOUNTER (EMERGENCY)
Facility: CLINIC | Age: 50
Discharge: HOME | End: 2020-06-02
Attending: EMERGENCY MEDICINE | Admitting: EMERGENCY MEDICINE
Payer: COMMERCIAL

## 2020-06-02 ENCOUNTER — APPOINTMENT (OUTPATIENT)
Dept: GENERAL RADIOLOGY | Facility: CLINIC | Age: 50
End: 2020-06-02
Attending: EMERGENCY MEDICINE
Payer: COMMERCIAL

## 2020-06-02 VITALS
SYSTOLIC BLOOD PRESSURE: 129 MMHG | OXYGEN SATURATION: 99 % | RESPIRATION RATE: 16 BRPM | DIASTOLIC BLOOD PRESSURE: 70 MMHG | TEMPERATURE: 98.6 F

## 2020-06-02 DIAGNOSIS — T18.9XXA SWALLOWED FOREIGN BODY, INITIAL ENCOUNTER: ICD-10-CM

## 2020-06-02 PROCEDURE — 71045 X-RAY EXAM CHEST 1 VIEW: CPT

## 2020-06-02 PROCEDURE — 99283 EMERGENCY DEPT VISIT LOW MDM: CPT | Mod: 25

## 2020-06-02 ASSESSMENT — ENCOUNTER SYMPTOMS
SHORTNESS OF BREATH: 0
CHOKING: 0
WHEEZING: 0
TROUBLE SWALLOWING: 0
STRIDOR: 0

## 2020-06-02 NOTE — ED AVS SNAPSHOT
Emergency Department  [address] [facility] [address]  Phone:  [phone]  Fax:  [phone]                                    Kary Perry   MRN: [id number]    Department:   Emergency Department   Date of Visit:  6/2/2020           After Visit Summary Signature Page    I have received my discharge instructions, and my questions have been answered. I have discussed any challenges I see with this plan with the nurse or doctor.    ..........................................................................................................................................  Patient/Patient Representative Signature      ..........................................................................................................................................  Patient Representative Print Name and Relationship to Patient    ..................................................               ................................................  Date                                   Time    ..........................................................................................................................................  Reviewed by Signature/Title    ...................................................              ..............................................  Date                                               Time          22EPIC Rev 08/18

## 2020-06-02 NOTE — ED PROVIDER NOTES
History     Chief Complaint:  Swallowed Foreign Body    HPI   Kary Perry is a 50 year old female who's birthday is today. Her mom brought salad from local restaurant. When she ate it, she thought she might have seen a flash of either some plastic, glass, or something in it. She had pain sharp in her throat, more on the left side. She tried to get this to move, and she feels it is no longer in her throat but it has now moved into her mid esophagus. She feels a little discomfort in her lower esophagus. She has been able to drink fluids. She denies any airway problems or any current throat pain.     Allergies:  Sulfa drugs: hives      Medications:    Calcium  Vitamin D3  Multivitamin  Fish oil  Vitamin B     Past Medical History:    Cystic mastopathy  HSV infection  Dermatitis  Ovarian cyst  Mono  TMJ syndrome    Past Surgical History:    Tonsillectomy  D&C  Endometrial ablation     Family History:    HTN, lipids, low epo level, lymphoma, HLD, anemia, skin cancer: mother   Alcohol/drug, CHF: father     Social History:  Smoking status: Never smoker    Substance use: No  Alcohol use: Yes   Marital Status:   [2]     Review of Systems   HENT: Negative for trouble swallowing.    Respiratory: Negative for choking, shortness of breath, wheezing and stridor.    All other systems reviewed and are negative.    Physical Exam     Patient Vitals for the past 24 hrs:   BP Temp Temp src Heart Rate Resp SpO2   06/02/20 1604 129/70 98.6  F (37  C) Oral 76 16 99 %     Physical Exam  Constitutional: Middle-aged female.   HENT: No signs of trauma. oropharynx is clear without redness or discharge.   Eyes: EOM are normal. Pupils are equal, round, and reactive to light.   Neck: Normal range of motion. No JVD present. No cervical adenopathy.  Cardiovascular: Regular rhythm.  Exam reveals no gallop and no friction rub.    No murmur heard.  Pulmonary/Chest: Bilateral breath sounds normal. No wheezes, rhonchi or  rales.  Abdominal: Soft. No tenderness. No rebound or guarding.   Musculoskeletal: No edema. No tenderness.   Lymphadenopathy: No lymphadenopathy.   Neurological: Alert and oriented to person, place, and time. Normal strength. Coordination normal.   Skin: Skin is warm and dry. No rash noted. No erythema.     Emergency Department Course     Imaging:  Radiographic findings were communicated with the patient who voiced understanding of the findings.    XR Chest Port 1 View  IMPRESSION: Negative chest. Lungs clear. As read by Radiology.     Emergency Department Course:  Past medical records, nursing notes, and vitals reviewed.    1715: I performed an exam of the patient as documented above.     The patient had a portable chest x-ray while in the emergency department, results above.     1943: I rechecked the patient and discussed the results of her workup thus far.     Findings and plan explained to the patient. Patient discharged home with instructions regarding supportive care and reasons to return. The importance of close follow-up was reviewed.    I personally answered all related questions prior to discharge.     Impression & Plan      Medical Decision Making:  Chest x-ray was obtained. This is unremarkable. There's no sign of foreign body. Patient is feeling better. I have discussed with her that most foreign body will pass. Nothing metallic was seen. Although plastic or glass may still not show up on x-ray. I have asked her to watch her stools, and if she should develop any increased pain, breathing problems, abdominal pain, or persistent vomiting, to follow-up. She has seen [facility] in the past, and I have given her the number.     Diagnosis:    ICD-10-CM   1. Swallowed foreign body, initial encounter  T18.9XXA     Disposition: Patient discharged to home     Raya Sheriff  6/2/2020    EMERGENCY DEPARTMENT    Scribe Disclosure:  Raya DELGADO, am serving as a scribe at 5:15 PM on 6/2/2020 to document services  personally performed by Yrn Araya MD based on my observations and the provider's statements to me.        Yrn Araya MD  06/03/20 0059

## 2020-06-02 NOTE — ED TRIAGE NOTES
Was having a salad from [employer] and sunlight caught something that looked like either glass or plastic around 12:45. Has some pain down throat. No bleeding noted.

## 2020-06-26 ENCOUNTER — OFFICE VISIT (OUTPATIENT)
Dept: DERMATOLOGY | Facility: CLINIC | Age: 50
End: 2020-06-26
Payer: COMMERCIAL

## 2020-06-26 VITALS — OXYGEN SATURATION: 99 % | DIASTOLIC BLOOD PRESSURE: 73 MMHG | HEART RATE: 78 BPM | SYSTOLIC BLOOD PRESSURE: 112 MMHG

## 2020-06-26 DIAGNOSIS — B07.0 VERRUCA PLANTARIS: Primary | ICD-10-CM

## 2020-06-26 PROCEDURE — 17110 DESTRUCTION B9 LES UP TO 14: CPT | Performed by: PHYSICIAN ASSISTANT

## 2020-06-26 NOTE — PROGRESS NOTES
HPI:   Chief complaints: Kary Perry is a 50 year old female who presents for recheck of a wart on the foot. Previously treated with LN2 in the office but did not resolve wart.   Condition present for:  years.   Previous treatments include: LN2 and topical sal acid OTC    Review Of Systems  Eyes: negative  Ears/Nose/Throat: negative  Respiratory: No shortness of breath, dyspnea on exertion, cough, or hemoptysis  Cardiovascular: negative  Gastrointestinal: negative  Genitourinary: negative  Musculoskeletal: negative  Neurologic: negative  Psychiatric: negative  Skin: positive for wart      PHYSICAL EXAM:    /73   Pulse 78   SpO2 99%   Skin exam performed as follows: Type 2 skin. Mood appropriate  Alert and Oriented X 3. Well developed, well nourished in no distress.  General appearance: Normal  Head including face: Normal  Eyes: conjunctiva and lids: Normal  Mouth: Lips, teeth, gums: Normal  Neck: Normal  Chest-breast/axillae: Normal  Back: Normal  Spleen and liver: Normal  Cardiovascular: Exam of peripheral vascular system by observation for swelling, varicosities, edema: Normal  Genitalia: groin, buttocks: Normal  Extremities: digits/nails (clubbing): Normal  Eccrine and Apocrine glands: Normal  Right upper extremity: Normal  Left upper extremity: Normal  Right lower extremity: Normal  Left lower extremity: Normal  Skin: Scalp and body hair: See below    1. Verrucous plaques on the right plantar surface x 5    ASSESSMENT/PLAN:     1. Warts on the right plantar surface - advised on diagnosis and treatment options. Has tried OTC and LN2 in the past. Discussed LN2, cantharidin, Candin, imiquimod and OTC treatments. Discussed likely need for multiple treatments. After lengthy discussion, she would like to proceed with IL candin and LN2.   --Cryosurgery performed. Advised on blistering and post op care.   --Candin injected to right plantar surface. A total of 0.3 cc used. Pt tolerated well, no  complications. Advised that there will be mild tenderness for the next few days; call if area is very tender, red or swollen.           Follow-up: 3-4 weeks  CC:   Scribed By: Marley Calle MS, [place]

## 2020-06-26 NOTE — LETTER
6/26/2020         RE: Kary Perry  [address] [facility] [address]        Dear Colleague,    Thank you for referring your patient, Kary Perry, to the [facility]. Please see a copy of my visit note below.    HPI:   Chief complaints: Kary Perry is a 50 year old female who presents for recheck of a wart on the foot. Previously treated with LN2 in the office but did not resolve wart.   Condition present for:  years.   Previous treatments include: LN2 and topical sal acid OTC    Review Of Systems  Eyes: negative  Ears/Nose/Throat: negative  Respiratory: No shortness of breath, dyspnea on exertion, cough, or hemoptysis  Cardiovascular: negative  Gastrointestinal: negative  Genitourinary: negative  Musculoskeletal: negative  Neurologic: negative  Psychiatric: negative  Skin: positive for wart      PHYSICAL EXAM:    /73   Pulse 78   SpO2 99%   Skin exam performed as follows: Type 2 skin. Mood appropriate  Alert and Oriented X 3. Well developed, well nourished in no distress.  General appearance: Normal  Head including face: Normal  Eyes: conjunctiva and lids: Normal  Mouth: Lips, teeth, gums: Normal  Neck: Normal  Chest-breast/axillae: Normal  Back: Normal  Spleen and liver: Normal  Cardiovascular: Exam of peripheral vascular system by observation for swelling, varicosities, edema: Normal  Genitalia: groin, buttocks: Normal  Extremities: digits/nails (clubbing): Normal  Eccrine and Apocrine glands: Normal  Right upper extremity: Normal  Left upper extremity: Normal  Right lower extremity: Normal  Left lower extremity: Normal  Skin: Scalp and body hair: See below    1. Verrucous plaques on the right plantar surface x 5    ASSESSMENT/PLAN:     1. Warts on the right plantar surface - advised on diagnosis and treatment options. Has tried OTC and LN2 in the past. Discussed LN2, cantharidin, Candin, imiquimod and OTC treatments. Discussed  likely need for multiple treatments. After lengthy discussion, she would like to proceed with IL candin and LN2.   --Cryosurgery performed. Advised on blistering and post op care.   --Candin injected to right plantar surface. A total of 0.3 cc used. Pt tolerated well, no complications. Advised that there will be mild tenderness for the next few days; call if area is very tender, red or swollen.           Follow-up: 3-4 weeks  CC:   Scribed By: Marley Calle, MS, PA-C        Again, thank you for allowing me to participate in the care of your patient.        Sincerely,        Marley Calle, [place]

## 2020-07-09 ENCOUNTER — THERAPY VISIT (OUTPATIENT)
Dept: PHYSICAL THERAPY | Facility: CLINIC | Age: 50
End: 2020-07-09
Payer: OTHER MISCELLANEOUS

## 2020-07-09 DIAGNOSIS — M54.50 RIGHT LOW BACK PAIN: ICD-10-CM

## 2020-07-09 PROCEDURE — 97112 NEUROMUSCULAR REEDUCATION: CPT | Mod: GP | Performed by: PHYSICAL THERAPIST

## 2020-07-09 PROCEDURE — 97140 MANUAL THERAPY 1/> REGIONS: CPT | Mod: GP | Performed by: PHYSICAL THERAPIST

## 2020-07-09 PROCEDURE — 97110 THERAPEUTIC EXERCISES: CPT | Mod: GP | Performed by: PHYSICAL THERAPIST

## 2020-07-09 NOTE — PROGRESS NOTES
Subjective:  HPI  Physical Exam                    Objective:  System    Physical Exam    General     ROS    Assessment/Plan:    PROGRESS  REPORT    Progress reporting period is from 4/6/2020 to 7/9/2020.       SUBJECTIVE  Subjective changes noted by patient:   Legs and feet have been doing well. Back is improving, notices occasional pain 1st thing in the morning while getting out of bed. The pain has moved back to the right side. Able to sit 30 minutes prior to having to move because of pain.      Current pain level is 1/10.     Previous pain level was  2/10.   Changes in function:  Yes (See Goal flowsheet attached for changes in current functional level)  Adverse reaction to treatment or activity: None    OBJECTIVE  Changes noted in objective findings:  Yes,     Objective:   AROM Lumbar Flx: floor, Ext: 50% pain on R,   Strength Hip Ext B: 4/5, Hip Abd B: +4/5; Pelvic Alignment: normal;   Pelvic Control Bridge #2a: R hip drop;   TrA contraction: fair;   Lumbar Mytomes: normal,   Palpation: R lumbar paraspinals, PSIS tender;   Core Endurance Prone Plank: 60 sec     ASSESSMENT/PLAN  Updated problem list and treatment plan: Diagnosis 1:  LBP  Pain -  hot/cold therapy, manual therapy, education, directional preference exercise and home program  Decreased ROM/flexibility - manual therapy and therapeutic exercise  Decreased strength - therapeutic exercise and therapeutic activities  Impaired muscle performance - neuro re-education  Decreased function - therapeutic activities  STG/LTGs have been met or progress has been made towards goals:  Yes (See Goal flow sheet completed today.)  Assessment of Progress: The patient's condition is improving.  The patient's condition has potential to improve.  Progress has been slower then anticipated. Patient has not scheduled as consistently as recommended, COVID pandemic has factored. While we have remained open, we were only able to provide virtual care for months. Patient felt she  benefited from hands on PT better then virtual, chose to wait until we returned to clinic.    Self Management Plans:  Patient has been instructed in a home treatment program.  I have re-evaluated this patient and find that the nature, scope, duration and intensity of the therapy is appropriate for the medical condition of the patient.  Kary continues to require the following intervention to meet STG and LTG's:  PT    Recommendations:  This patient would benefit from continued therapy.     Frequency:  2 X a month, once daily  Duration:  for 4 weeks        Please refer to the daily flowsheet for treatment today, total treatment time and time spent performing 1:1 timed codes.

## 2020-07-16 ENCOUNTER — THERAPY VISIT (OUTPATIENT)
Dept: PHYSICAL THERAPY | Facility: CLINIC | Age: 50
End: 2020-07-16
Payer: OTHER MISCELLANEOUS

## 2020-07-16 DIAGNOSIS — G44.209 TENSION HEADACHE: ICD-10-CM

## 2020-07-16 DIAGNOSIS — S16.1XXD STRAIN OF NECK MUSCLE, SUBSEQUENT ENCOUNTER: ICD-10-CM

## 2020-07-16 DIAGNOSIS — M26.609 TEMPOROMANDIBULAR DYSFUNCTION SYNDROME: ICD-10-CM

## 2020-07-16 PROCEDURE — 97110 THERAPEUTIC EXERCISES: CPT | Mod: GP | Performed by: PHYSICAL THERAPIST

## 2020-07-16 PROCEDURE — 97164 PT RE-EVAL EST PLAN CARE: CPT | Mod: GP | Performed by: PHYSICAL THERAPIST

## 2020-07-16 PROCEDURE — 97140 MANUAL THERAPY 1/> REGIONS: CPT | Mod: GP | Performed by: PHYSICAL THERAPIST
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
## 2020-07-21 ENCOUNTER — OFFICE VISIT (OUTPATIENT)
Dept: DERMATOLOGY | Facility: CLINIC | Age: 50
End: 2020-07-21
Payer: COMMERCIAL

## 2020-07-21 VITALS — OXYGEN SATURATION: 97 % | DIASTOLIC BLOOD PRESSURE: 69 MMHG | HEART RATE: 86 BPM | SYSTOLIC BLOOD PRESSURE: 121 MMHG

## 2020-07-21 DIAGNOSIS — B07.0 VERRUCA PLANTARIS: Primary | ICD-10-CM

## 2020-07-21 PROCEDURE — 17110 DESTRUCTION B9 LES UP TO 14: CPT | Performed by: PHYSICIAN ASSISTANT

## 2020-07-21 NOTE — LETTER
7/21/2020         RE: Kary Perry  [address] [facility] [address]        Dear Colleague,    Thank you for referring your patient, Kary Perry, to the [facility]. Please see a copy of my visit note below.    HPI:   Chief complaints: Kary Perry is a 50 year old female who presents for recheck of a wart on the foot. Improved after LN2 and IL candin.    Condition present for:  years.   Previous treatments include: LN2 and topical sal acid OTC    Review Of Systems  Eyes: negative  Ears/Nose/Throat: negative  Respiratory: No shortness of breath, dyspnea on exertion, cough, or hemoptysis  Cardiovascular: negative  Gastrointestinal: negative  Genitourinary: negative  Musculoskeletal: negative  Neurologic: negative  Psychiatric: negative  Skin: positive for wart    HPI, past medical history, social history, allergies, medications reviewed as of July 21, 2020        PHYSICAL EXAM:    /69   Pulse 86   SpO2 97%   Skin exam performed as follows: Type 2 skin. Mood appropriate  Alert and Oriented X 3. Well developed, well nourished in no distress.  General appearance: Normal  Head including face: Normal  Eyes: conjunctiva and lids: Normal  Mouth: Lips, teeth, gums: Normal  Neck: Normal  Chest-breast/axillae: Normal  Back: Normal  Spleen and liver: Normal  Cardiovascular: Exam of peripheral vascular system by observation for swelling, varicosities, edema: Normal  Genitalia: groin, buttocks: Normal  Extremities: digits/nails (clubbing): Normal  Eccrine and Apocrine glands: Normal  Right upper extremity: Normal  Left upper extremity: Normal  Right lower extremity: Normal  Left lower extremity: Normal  Skin: Scalp and body hair: See below    1. Verrucous plaques on the right plantar surface x 5    ASSESSMENT/PLAN:     1. Warts on the right plantar surface - advised on diagnosis and treatment options. Has tried OTC and LN2 in the past. Discussed  LN2, cantharidin, Candin, imiquimod and OTC treatments. Discussed likely need for multiple treatments. After lengthy discussion, she would like to proceed with IL candin and LN2.   --Cryosurgery performed. Advised on blistering and post op care.   --Candin injected to right plantar surface. A total of 0.3 cc used. Pt tolerated well, no complications. Advised that there will be mild tenderness for the next few days; call if area is very tender, red or swollen.           Follow-up: 3-4 weeks  CC:   Scribed By: Marley Calle, MS, PASimiC        Again, thank you for allowing me to participate in the care of your patient.        Sincerely,        Marley Calle PA-C

## 2020-07-21 NOTE — PROGRESS NOTES
HPI:   Chief complaints: Kary Perry is a 50 year old female who presents for recheck of a wart on the foot. Improved after LN2 and IL candin.    Condition present for:  years.   Previous treatments include: LN2 and topical sal acid OTC    Review Of Systems  Eyes: negative  Ears/Nose/Throat: negative  Respiratory: No shortness of breath, dyspnea on exertion, cough, or hemoptysis  Cardiovascular: negative  Gastrointestinal: negative  Genitourinary: negative  Musculoskeletal: negative  Neurologic: negative  Psychiatric: negative  Skin: positive for wart    HPI, past medical history, social history, allergies, medications reviewed as of July 21, 2020        PHYSICAL EXAM:    /69   Pulse 86   SpO2 97%   Skin exam performed as follows: Type 2 skin. Mood appropriate  Alert and Oriented X 3. Well developed, well nourished in no distress.  General appearance: Normal  Head including face: Normal  Eyes: conjunctiva and lids: Normal  Mouth: Lips, teeth, gums: Normal  Neck: Normal  Chest-breast/axillae: Normal  Back: Normal  Spleen and liver: Normal  Cardiovascular: Exam of peripheral vascular system by observation for swelling, varicosities, edema: Normal  Genitalia: groin, buttocks: Normal  Extremities: digits/nails (clubbing): Normal  Eccrine and Apocrine glands: Normal  Right upper extremity: Normal  Left upper extremity: Normal  Right lower extremity: Normal  Left lower extremity: Normal  Skin: Scalp and body hair: See below    1. Verrucous plaques on the right plantar surface x 5    ASSESSMENT/PLAN:     1. Warts on the right plantar surface - advised on diagnosis and treatment options. Has tried OTC and LN2 in the past. Discussed LN2, cantharidin, Candin, imiquimod and OTC treatments. Discussed likely need for multiple treatments. After lengthy discussion, she would like to proceed with IL candin and LN2.   --Cryosurgery performed. Advised on blistering and post op care.   --Candin injected to right  plantar surface. A total of 0.3 cc used. Pt tolerated well, no complications. Advised that there will be mild tenderness for the next few days; call if area is very tender, red or swollen.           Follow-up: 3-4 weeks  CC:   Scribed By: Marley Calle MS, PASimiC

## 2020-07-23 ENCOUNTER — THERAPY VISIT (OUTPATIENT)
Dept: PHYSICAL THERAPY | Facility: CLINIC | Age: 50
End: 2020-07-23
Payer: OTHER MISCELLANEOUS

## 2020-07-23 DIAGNOSIS — M54.50 RIGHT LOW BACK PAIN: ICD-10-CM

## 2020-07-23 PROCEDURE — 97140 MANUAL THERAPY 1/> REGIONS: CPT | Mod: GP | Performed by: PHYSICAL THERAPIST

## 2020-07-23 PROCEDURE — 97112 NEUROMUSCULAR REEDUCATION: CPT | Mod: GP | Performed by: PHYSICAL THERAPIST

## 2020-07-23 PROCEDURE — 97110 THERAPEUTIC EXERCISES: CPT | Mod: GP | Performed by: PHYSICAL THERAPIST

## 2020-08-03 ENCOUNTER — THERAPY VISIT (OUTPATIENT)
Dept: PHYSICAL THERAPY | Facility: CLINIC | Age: 50
End: 2020-08-03
Payer: OTHER MISCELLANEOUS

## 2020-08-03 DIAGNOSIS — S16.1XXD STRAIN OF NECK MUSCLE, SUBSEQUENT ENCOUNTER: ICD-10-CM

## 2020-08-03 DIAGNOSIS — M26.609 TEMPOROMANDIBULAR DYSFUNCTION SYNDROME: ICD-10-CM

## 2020-08-03 DIAGNOSIS — G44.209 TENSION HEADACHE: ICD-10-CM

## 2020-08-03 PROCEDURE — 97110 THERAPEUTIC EXERCISES: CPT | Mod: GP | Performed by: PHYSICAL THERAPIST

## 2020-08-03 PROCEDURE — 97140 MANUAL THERAPY 1/> REGIONS: CPT | Mod: GP | Performed by: PHYSICAL THERAPIST

## 2020-08-03 PROCEDURE — 97112 NEUROMUSCULAR REEDUCATION: CPT | Mod: GP | Performed by: PHYSICAL THERAPIST

## 2020-08-11 ENCOUNTER — OFFICE VISIT (OUTPATIENT)
Dept: DERMATOLOGY | Facility: CLINIC | Age: 50
End: 2020-08-11
Payer: COMMERCIAL

## 2020-08-11 VITALS — DIASTOLIC BLOOD PRESSURE: 70 MMHG | HEART RATE: 67 BPM | OXYGEN SATURATION: 100 % | SYSTOLIC BLOOD PRESSURE: 105 MMHG

## 2020-08-11 DIAGNOSIS — B07.0 VERRUCA PLANTARIS: Primary | ICD-10-CM

## 2020-08-11 PROCEDURE — 17110 DESTRUCTION B9 LES UP TO 14: CPT | Performed by: PHYSICIAN ASSISTANT

## 2020-08-11 NOTE — LETTER
8/11/2020         RE: Kary Perry  [address] [address]        Dear Colleague,    Thank you for referring your patient, [facility]ary Perry, to [facility]. Please see a copy of my visit note below.    HPI:   Chief complaints: Kary Perry is a 50 year old female who presents for recheck of a wart on the foot. Improved after LN2 and IL candin.    Condition present for:  years.   Previous treatments include: LN2 and topical sal acid OTC    Review Of Systems  Eyes: negative  Ears/Nose/Throat: negative  Respiratory: No shortness of breath, dyspnea on exertion, cough, or hemoptysis  Cardiovascular: negative  Gastrointestinal: negative  Genitourinary: negative  Musculoskeletal: negative  Neurologic: negative  Psychiatric: negative  Skin: positive for wart  HPI, past medical history, social history, allergies, medications reviewed as of August 11, 2020        PHYSICAL EXAM:    /70   Pulse 67   SpO2 100%   Breastfeeding No   Skin exam performed as follows: Type 2 skin. Mood appropriate  Alert and Oriented X 3. Well developed, well nourished in no distress.  General appearance: Normal  Head including face: Normal  Eyes: conjunctiva and lids: Normal  Mouth: Lips, teeth, gums: Normal  Neck: Normal  Chest-breast/axillae: Normal  Back: Normal  Spleen and liver: Normal  Cardiovascular: Exam of peripheral vascular system by observation for swelling, varicosities, edema: Normal  Genitalia: groin, buttocks: Normal  Extremities: digits/nails (clubbing): Normal  Eccrine and Apocrine glands: Normal  Right upper extremity: Normal  Left upper extremity: Normal  Right lower extremity: Normal  Left lower extremity: Normal  Skin: Scalp and body hair: See below    1. Verrucous plaques on the right plantar surface x 2    ASSESSMENT/PLAN:     1. Warts on the right plantar surface - almost gone - just faint residual lesions. Advised this will likely be  the final treatment.   --Cryosurgery performed. Advised on blistering and post op care.   --Candin injected to right plantar surface. A total of 0.3 cc used. Pt tolerated well, no complications. Advised that there will be mild tenderness for the next few days; call if area is very tender, red or swollen.           Follow-up: 3-4 weeks  CC:   Scribed By: Marley Calle MS, HAMILTON        Again, thank you for allowing me to participate in the care of your patient.        Sincerely,        Marley Calle PA-C

## 2020-08-11 NOTE — PROGRESS NOTES
HPI:   Chief complaints: Kary Perry is a 50 year old female who presents for recheck of a wart on the foot. Improved after LN2 and IL candin.    Condition present for:  years.   Previous treatments include: LN2 and topical sal acid OTC    Review Of Systems  Eyes: negative  Ears/Nose/Throat: negative  Respiratory: No shortness of breath, dyspnea on exertion, cough, or hemoptysis  Cardiovascular: negative  Gastrointestinal: negative  Genitourinary: negative  Musculoskeletal: negative  Neurologic: negative  Psychiatric: negative  Skin: positive for wart  HPI, past medical history, social history, allergies, medications reviewed as of August 11, 2020        PHYSICAL EXAM:    /70   Pulse 67   SpO2 100%   Breastfeeding No   Skin exam performed as follows: Type 2 skin. Mood appropriate  Alert and Oriented X 3. Well developed, well nourished in no distress.  General appearance: Normal  Head including face: Normal  Eyes: conjunctiva and lids: Normal  Mouth: Lips, teeth, gums: Normal  Neck: Normal  Chest-breast/axillae: Normal  Back: Normal  Spleen and liver: Normal  Cardiovascular: Exam of peripheral vascular system by observation for swelling, varicosities, edema: Normal  Genitalia: groin, buttocks: Normal  Extremities: digits/nails (clubbing): Normal  Eccrine and Apocrine glands: Normal  Right upper extremity: Normal  Left upper extremity: Normal  Right lower extremity: Normal  Left lower extremity: Normal  Skin: Scalp and body hair: See below    1. Verrucous plaques on the right plantar surface x 2    ASSESSMENT/PLAN:     1. Warts on the right plantar surface - almost gone - just faint residual lesions. Advised this will likely be the final treatment.   --Cryosurgery performed. Advised on blistering and post op care.   --Candin injected to right plantar surface. A total of 0.3 cc used. Pt tolerated well, no complications. Advised that there will be mild tenderness for the next few days; call if area  is very tender, red or swollen.           Follow-up: 3-4 weeks  CC:   Scribed By: Marley Calle, MS, PASimiC

## 2020-08-18 ENCOUNTER — THERAPY VISIT (OUTPATIENT)
Dept: PHYSICAL THERAPY | Facility: CLINIC | Age: 50
End: 2020-08-18
Payer: OTHER MISCELLANEOUS

## 2020-08-18 DIAGNOSIS — M54.50 RIGHT LOW BACK PAIN: ICD-10-CM

## 2020-08-18 PROCEDURE — 97112 NEUROMUSCULAR REEDUCATION: CPT | Mod: GP | Performed by: PHYSICAL THERAPIST

## 2020-08-18 PROCEDURE — 97140 MANUAL THERAPY 1/> REGIONS: CPT | Mod: GP | Performed by: PHYSICAL THERAPIST

## 2020-08-18 PROCEDURE — 97110 THERAPEUTIC EXERCISES: CPT | Mod: GP | Performed by: PHYSICAL THERAPIST

## 2020-08-18 NOTE — PROGRESS NOTES
Subjective:  HPI  Physical Exam                    Objective:  System    Physical Exam    General     ROS    Assessment/Plan:    DISCHARGE REPORT    Progress reporting period is from 7/9/2020 to 8/18/2020.       SUBJECTIVE  Subjective changes noted by patient:  Back has been doing well. General day to day goes well, only effects 10%. Does notice pain while driving, especially while running errands. Went on a longer road trip, 4 hours without stopping, back was fine. 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 When pain does occur it is located in the R low back, denies radicular symptoms. Exercises going well, every other day. They remain tiring.       Current pain level is 1/10.     Previous pain level was 2/10.   Changes in function:  Yes (See Goal flowsheet attached for changes in current functional level)  Adverse reaction to treatment or activity: None    OBJECTIVE  Changes noted in objective findings:  Yes,   Objective:   AROM Lumbar Flx: floor, Ext: 100% mild R sided pain, SB R: 90% painful, L: 100%;   SLR -B,   Lumbar Myotomes normal B;   Strength Hip Ext B: +4/5, Hip Abd R: -5/5, L: +4/5,   Core Endurance Modified Side Plank R: 60 sec, L: 60 sec, Pronk Plank: 60 sec     ASSESSMENT/PLAN  Updated problem list and treatment plan: Diagnosis 1:  R sided low back pain  Pain -  home program  Decreased ROM/flexibility - home program  Decreased strength - home program  Impaired muscle performance - home program  Decreased function - home program  STG/LTGs have been met or progress has been made towards goals:  Yes (See Goal flow sheet completed today.)  Assessment of Progress: The patient has met all of their long term goals.  The patient's progress has plateaued.  Self Management Plans:  Patient is independent in a home treatment program.  I have re-evaluated this patient and find that the nature, scope, duration and intensity of the therapy is appropriate for the medical  condition of the patient.  Kary continues to require the following intervention to meet STG and LTG's:  PT intervention is no longer required to meet STG/LTG.    Recommendations:  This patient is ready to be discharged from therapy and continue their home treatment program.  This patient would benefit from further evaluation, would benefit from Sports Medicine or Spine specialist.     Please refer to the daily flowsheet for treatment today, total treatment time and time spent performing 1:1 timed codes.

## 2020-08-19 ENCOUNTER — VIRTUAL VISIT (OUTPATIENT)
Dept: FAMILY MEDICINE | Facility: CLINIC | Age: 50
End: 2020-08-19
Payer: OTHER MISCELLANEOUS

## 2020-08-19 ENCOUNTER — THERAPY VISIT (OUTPATIENT)
Dept: PHYSICAL THERAPY | Facility: CLINIC | Age: 50
End: 2020-08-19
Payer: OTHER MISCELLANEOUS

## 2020-08-19 VITALS — HEIGHT: 66 IN | BODY MASS INDEX: 25.39 KG/M2 | WEIGHT: 158 LBS

## 2020-08-19 DIAGNOSIS — M26.609 TEMPOROMANDIBULAR DYSFUNCTION SYNDROME: ICD-10-CM

## 2020-08-19 DIAGNOSIS — M54.50 ACUTE RIGHT-SIDED LOW BACK PAIN WITHOUT SCIATICA: ICD-10-CM

## 2020-08-19 DIAGNOSIS — S16.1XXD STRAIN OF NECK MUSCLE, SUBSEQUENT ENCOUNTER: ICD-10-CM

## 2020-08-19 DIAGNOSIS — G44.209 TENSION HEADACHE: ICD-10-CM

## 2020-08-19 DIAGNOSIS — S13.9XXD NECK SPRAIN, SUBSEQUENT ENCOUNTER: Primary | ICD-10-CM

## 2020-08-19 PROCEDURE — 99214 OFFICE O/P EST MOD 30 MIN: CPT | Mod: 95 | Performed by: NURSE PRACTITIONER

## 2020-08-19 PROCEDURE — 97112 NEUROMUSCULAR REEDUCATION: CPT | Mod: GP | Performed by: PHYSICAL THERAPIST

## 2020-08-19 PROCEDURE — 97140 MANUAL THERAPY 1/> REGIONS: CPT | Mod: GP | Performed by: PHYSICAL THERAPIST

## 2020-08-19 PROCEDURE — 97110 THERAPEUTIC EXERCISES: CPT | Mod: GP | Performed by: PHYSICAL THERAPIST

## 2020-08-19 ASSESSMENT — MIFFLIN-ST. JEOR: SCORE: 1348.43
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
## 2020-08-19 NOTE — PROGRESS NOTES
"Kary Perry is a 50 year old female who is being evaluated via a billable video visit.      The patient has been notified of following:     \"This video visit will be conducted via a call between you and your physician/provider. We have found that certain health care needs can be provided without the need for an in-person physical exam.  This service lets us provide the care you need with a video conversation.  If a prescription is necessary we can send it directly to your pharmacy.  If lab work is needed we can place an order for that and you can then stop by our lab to have the test done at a later time.    Video visits are billed at different rates depending on your insurance coverage.  Please reach out to your insurance provider with any questions.    If during the course of the call the physician/provider feels a video visit is not appropriate, you will not be charged for this service.\"    Patient has given verbal consent for Video visit? Yes  How would you like to obtain your AVS? MyChart  If you are dropped from the video visit, the video invite should be resent to: Send to e-mail at: [email or web address]  Will anyone else be joining your video visit? No    Subjective     Kary Perry is a 50 year old female who presents today via video visit for the following health issues:    HPI      Chief Complaint   Patient presents with     Follow Up     work comp        2 different clinics:  1--back and SI joint problems  2--head and neck    Back care:  \"going okay.\"  She has done physical therapy.  She has completed her PT but Kary continues her exericises at home.  The physical therapy was very helpful.    She is able to do her home exercises without difficulty.  Chiropractor, but not through [place]. She pays for that out of pocket.  It was recommended to her by physical therapy at that if she does not find that PT is 100% helpful for her back and SI joint, that she should follow-up with " "an orthopedist.  She has not felt that a specialty consult is necessary at this time.  She also has had massage in the past, last time March 2020.    She denies any new back or SI joint symptoms.    Head and neck:  She is currently seeing a different therapist.  Doing neck exercises.  She has also started jaw exercises.  Ongoing headaches and she has been taking ibuprofen intermittently for that.  She is very hopeful that the new therapist, jaw care, and continuing that care will relieve her pain and will help her headaches.    \"I feel like I am making progress, but COVID has not been helpful for making progress.\"    Her insurance, [employer] requested this appointment  October 30, 2019 [facility].  Kary was seen in  first and then she saw me 11/19/2019 for a follow up.    Working for home.  Getting more aerobic activity.  \"I've even lost weight, which is great.\"    Video Start Time: 3:35 PM      Review of Systems   Constitutional, HEENT, cardiovascular, pulmonary, GI, , musculoskeletal, neuro, skin, endocrine and psych systems are negative, except as otherwise noted.      Objective         Vitals:  No vitals were obtained today due to virtual visit.    Physical Exam     GENERAL: Healthy, alert and no distress  EYES: Eyes grossly normal to inspection.  No discharge or erythema, or obvious scleral/conjunctival abnormalities.  RESP: No audible wheeze, cough, or visible cyanosis.  No visible retractions or increased work of breathing.    SKIN: Visible skin clear.   NEURO: Cranial nerves grossly intact.  Mentation and speech appropriate for age.  PSYCH: Mentation appears normal, affect normal/bright, judgement and insight intact, normal speech and appearance well-groomed.          Assessment & Plan     (S13.9XXD) Neck sprain, subsequent encounter  (primary encounter diagnosis)  Comment: Continuing  Plan: For today, I did reassure Kary and I think it is important that she continue her physical therapy.  This will be " "helpful for her neck itself, as well as for her headaches.  With her new physical therapist who is working on her neck care they are also doing job work and she is very excited about this.  She thinks it is already off to good start.  I encouraged her to continue her PT for neck and jaw pain, soft diet, and follow up with me as needed, otherwise I will anticipate that she is doing better.   Ibuprofen okay.    (M26.609) Temporomandibular dysfunction syndrome  Comment: ongoing  Plan: continue care with PT.   See above.     (M54.5) Acute right-sided low back pain without sciatica  Comment: improved  Plan: I did encourage her to continue her home exercise, stretching, etc.  Follow up with me PRN.    BMI:   Estimated body mass index is 25.5 kg/m  as calculated from the following:    Height as of this encounter: 1.676 m (5' 6\").    Weight as of this encounter: 71.7 kg (158 lb).     See Patient Instructions    Return in about 3 months (around 11/19/2020), or if symptoms worsen or fail to improve.    RONY Ramirez CNP  [facility]      Video-Visit Details    Type of service:  Video Visit    Video End Time:3:53 PM    Originating Location (pt. Location): Home    Distant Location (provider location):  [facility]     Platform used for Video Visit: Robert          "
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
## 2020-09-03 ENCOUNTER — TELEPHONE (OUTPATIENT)
Dept: PHYSICAL THERAPY | Facility: CLINIC | Age: 50
End: 2020-09-03

## 2020-09-03 ENCOUNTER — THERAPY VISIT (OUTPATIENT)
Dept: PHYSICAL THERAPY | Facility: CLINIC | Age: 50
End: 2020-09-03
Payer: OTHER MISCELLANEOUS

## 2020-09-03 DIAGNOSIS — G44.209 TENSION HEADACHE: ICD-10-CM

## 2020-09-03 DIAGNOSIS — S16.1XXD STRAIN OF NECK MUSCLE, SUBSEQUENT ENCOUNTER: ICD-10-CM

## 2020-09-03 DIAGNOSIS — M26.609 TEMPOROMANDIBULAR DYSFUNCTION SYNDROME: ICD-10-CM

## 2020-09-03 PROCEDURE — 97110 THERAPEUTIC EXERCISES: CPT | Mod: GP | Performed by: PHYSICAL THERAPIST

## 2020-09-03 PROCEDURE — 97140 MANUAL THERAPY 1/> REGIONS: CPT | Mod: GP | Performed by: PHYSICAL THERAPIST

## 2020-09-03 NOTE — TELEPHONE ENCOUNTER
Called adjust listed in appointment notes who is out of the office with no set return date.   Was redirected to another  who connected me with the nurse on the case, Ruel Llanes.   He approved 4 additional visits beyond today, and asked that the request be faxed to [phone]

## 2020-09-22 ENCOUNTER — OFFICE VISIT (OUTPATIENT)
Dept: DERMATOLOGY | Facility: CLINIC | Age: 50
End: 2020-09-22
Payer: COMMERCIAL

## 2020-09-22 VITALS — SYSTOLIC BLOOD PRESSURE: 113 MMHG | DIASTOLIC BLOOD PRESSURE: 63 MMHG | HEART RATE: 91 BPM | OXYGEN SATURATION: 98 %

## 2020-09-22 DIAGNOSIS — B07.9 VERRUCA VULGARIS: Primary | ICD-10-CM

## 2020-09-22 PROCEDURE — 17110 DESTRUCTION B9 LES UP TO 14: CPT | Performed by: PHYSICIAN ASSISTANT

## 2020-09-22 NOTE — PROGRESS NOTES
HPI:   Chief complaints: Kary Perry is a 50 year old female who presents for recheck of a wart on the foot. Improved after LN2 and IL candin.    Condition present for:  years.   Previous treatments include: LN2 and topical sal acid OTC    Review Of Systems  Eyes: negative  Ears/Nose/Throat: negative  Respiratory: No shortness of breath, dyspnea on exertion, cough, or hemoptysis  Cardiovascular: negative  Gastrointestinal: negative  Genitourinary: negative  Musculoskeletal: negative  Neurologic: negative  Psychiatric: negative  Skin: positive for wart    HPI, past medical history, social history, allergies, medications reviewed as of September 22, 2020      PHYSICAL EXAM:    /63   Pulse 91   SpO2 98%   Skin exam performed as follows: Type 2 skin. Mood appropriate  Alert and Oriented X 3. Well developed, well nourished in no distress.  General appearance: Normal  Head including face: Normal  Eyes: conjunctiva and lids: Normal  Mouth: Lips, teeth, gums: Normal  Neck: Normal  Chest-breast/axillae: Normal  Back: Normal  Spleen and liver: Normal  Cardiovascular: Exam of peripheral vascular system by observation for swelling, varicosities, edema: Normal  Genitalia: groin, buttocks: Normal  Extremities: digits/nails (clubbing): Normal  Eccrine and Apocrine glands: Normal  Right upper extremity: Normal  Left upper extremity: Normal  Right lower extremity: Normal  Left lower extremity: Normal  Skin: Scalp and body hair: See below    1. Verrucous plaques on the right plantar surface x 2    ASSESSMENT/PLAN:     1. Warts on the right plantar surface - still present. Discussed excision vs continuing LN2 and IL candin.   --Cryosurgery performed. Advised on blistering and post op care.   --Candin injected to right plantar surface. A total of 0.3 cc used. Pt tolerated well, no complications. Advised that there will be mild tenderness for the next few days; call if area is very tender, red or swollen.            Follow-up: 3-4 weeks  CC:   Scribed By: Marley Calle, MS, PA-C

## 2020-10-20 ENCOUNTER — OFFICE VISIT (OUTPATIENT)
Dept: DERMATOLOGY | Facility: CLINIC | Age: 50
End: 2020-10-20
Payer: COMMERCIAL

## 2020-10-20 VITALS — DIASTOLIC BLOOD PRESSURE: 63 MMHG | OXYGEN SATURATION: 98 % | SYSTOLIC BLOOD PRESSURE: 108 MMHG | HEART RATE: 89 BPM

## 2020-10-20 DIAGNOSIS — L82.1 SEBORRHEIC KERATOSIS: ICD-10-CM

## 2020-10-20 DIAGNOSIS — B07.0 VERRUCA PLANTARIS: ICD-10-CM

## 2020-10-20 DIAGNOSIS — D18.01 ANGIOMA OF SKIN: ICD-10-CM

## 2020-10-20 DIAGNOSIS — D22.9 NEVUS: Primary | ICD-10-CM

## 2020-10-20 DIAGNOSIS — L81.4 LENTIGO: ICD-10-CM

## 2020-10-20 PROCEDURE — 99214 OFFICE O/P EST MOD 30 MIN: CPT | Mod: 25 | Performed by: PHYSICIAN ASSISTANT

## 2020-10-20 PROCEDURE — 11900 INJECT SKIN LESIONS </W 7: CPT | Performed by: PHYSICIAN ASSISTANT

## 2020-10-20 RX ORDER — MULTIVIT WITH MINERALS/LUTEIN
1000 TABLET ORAL 2 TIMES DAILY
COMMUNITY

## 2020-10-20 NOTE — PROGRESS NOTES
HPI:   Chief complaints: Kary Perry is a 50 year old female who presents for Full skin cancer screening to rule out skin cancer   Last Skin Exam: 1 year ago      1st Baseline: no  Personal HX of Skin Cancer: no   Personal HX of Malignant Melanoma: no   Family HX of Skin Cancer / Malignant Melanoma: no  Personal HX of Atypical Moles:   no  Risk factors: history of sun exposure and burns; some tanning bed use in the past  New / Changing lesions: no but wart on the foot is still present  Social History: Wife Estrellita. They like to sail  On review of systems, there are no further skin complaints, patient is feeling otherwise well.  See patient intake sheet.  ROS of the following were done and are negative: Constitutional, Eyes, Ears, Nose,   Mouth, Throat, Cardiovascular, Respiratory, GI, Genitourinary, Musculoskeletal,   Psychiatric, Endocrine, Allergic/Immunologic.    PHYSICAL EXAM:   /63   Pulse 89   SpO2 98%   Breastfeeding No   Skin exam performed as follows: Type 2 skin. Mood appropriate  Alert and Oriented X 3. Well developed, well nourished in no distress.  General appearance: Normal  Head including face: Normal  Eyes: conjunctiva and lids: Normal  Mouth: Lips, teeth, gums: Normal  Neck: Normal  Chest-breast/axillae: Normal  Back: Normal  Spleen and liver: Normal  Cardiovascular: Exam of peripheral vascular system by observation for swelling, varicosities, edema: Normal  Genitalia: groin, buttocks: Normal  Extremities: digits/nails (clubbing): Normal  Eccrine and Apocrine glands: Normal  Right upper extremity: Normal  Left upper extremity: Normal  Right lower extremity: Normal  Left lower extremity: Normal  Skin: Scalp and body hair: See below    Pt deferred exam of breasts, groin, buttocks: No    Other physical findings:  1. Multiple pigmented macules on extremities and trunk  2. Multiple pigmented macules on face, trunk and extremities  3. Multiple vascular papules on trunk, arms and  legs  4. Multiple scattered keratotic plaques  5. Verrucous papule on the right plantar surface x 1       Except as noted above, no other signs of skin cancer or melanoma.     ASSESSMENT/PLAN:   Benign Full skin cancer screening today. . Patient with history of none  Advised on monthly self exams and 1 year  Patient Education: Appropriate brochures given.    1. Multiple benign appearing nevi on arms, legs and trunk. Discussed ABCDEs of melanoma and sunscreen.   2. Multiple lentigos on arms, legs and trunk. Advised benign, no treatment needed.  3. Multiple scattered angiomas. Advised benign, no treatment needed.   4. Seborrheic keratosis on arms, legs and trunk. Advised benign, no treatment needed.  5. Verruca plantaris - Candin injected to plantar surface. A total of 0.3 cc used. Pt tolerated well, no complications. Advised that there will be mild tenderness for the next few days; call if area is very tender, red or swollen.   6. Radha to follow up with Primary Care provider regarding elevated blood pressure.            Follow-up: yearly FSE/PRN sooner    1.) Patient was asked about new and changing moles. YES  2.) Patient received a complete physical skin examination: YES  3.) Patient was counseled to perform a monthly self skin examination: YES  Scribed By: Marley Calle MS, PAYESI

## 2020-10-20 NOTE — LETTER
10/20/2020         RE: Kary Perry  [address] [facility] [address]        Dear Colleague,    Thank you for referring your patient, Kary Perry, to the [facility]. Please see a copy of my visit note below.    HPI:   Chief complaints: Kary Perry is a 50 year old female who presents for Full skin cancer screening to rule out skin cancer   Last Skin Exam: 1 year ago      1st Baseline: no  Personal HX of Skin Cancer: no   Personal HX of Malignant Melanoma: no   Family HX of Skin Cancer / Malignant Melanoma: no  Personal HX of Atypical Moles:   no  Risk factors: history of sun exposure and burns; some tanning bed use in the past  New / Changing lesions: no but wart on the foot is still present  Social History: Wife Estrellita. They like to sail  On review of systems, there are no further skin complaints, patient is feeling otherwise well.  See patient intake sheet.  ROS of the following were done and are negative: Constitutional, Eyes, Ears, Nose,   Mouth, Throat, Cardiovascular, Respiratory, GI, Genitourinary, Musculoskeletal,   Psychiatric, Endocrine, Allergic/Immunologic.    PHYSICAL EXAM:   /63   Pulse 89   SpO2 98%   Breastfeeding No   Skin exam performed as follows: Type 2 skin. Mood appropriate  Alert and Oriented X 3. Well developed, well nourished in no distress.  General appearance: Normal  Head including face: Normal  Eyes: conjunctiva and lids: Normal  Mouth: Lips, teeth, gums: Normal  Neck: Normal  Chest-breast/axillae: Normal  Back: Normal  Spleen and liver: Normal  Cardiovascular: Exam of peripheral vascular system by observation for swelling, varicosities, edema: Normal  Genitalia: groin, buttocks: Normal  Extremities: digits/nails (clubbing): Normal  Eccrine and Apocrine glands: Normal  Right upper extremity: Normal  Left upper extremity: Normal  Right lower extremity: Normal  Left lower extremity: Normal  Skin:  Scalp and body hair: See below    Pt deferred exam of breasts, groin, buttocks: No    Other physical findings:  1. Multiple pigmented macules on extremities and trunk  2. Multiple pigmented macules on face, trunk and extremities  3. Multiple vascular papules on trunk, arms and legs  4. Multiple scattered keratotic plaques  5. Verrucous papule on the right plantar surface x 1       Except as noted above, no other signs of skin cancer or melanoma.     ASSESSMENT/PLAN:   Benign Full skin cancer screening today. . Patient with history of none  Advised on monthly self exams and 1 year  Patient Education: Appropriate brochures given.    1. Multiple benign appearing nevi on arms, legs and trunk. Discussed ABCDEs of melanoma and sunscreen.   2. Multiple lentigos on arms, legs and trunk. Advised benign, no treatment needed.  3. Multiple scattered angiomas. Advised benign, no treatment needed.   4. Seborrheic keratosis on arms, legs and trunk. Advised benign, no treatment needed.  5. Verruca plantaris - Candin injected to plantar surface. A total of 0.3 cc used. Pt tolerated well, no complications. Advised that there will be mild tenderness for the next few days; call if area is very tender, red or swollen.   6. Radha to follow up with Primary Care provider regarding elevated blood pressure.            Follow-up: yearly FSE/PRN sooner    1.) Patient was asked about new and changing moles. YES  2.) Patient received a complete physical skin examination: YES  3.) Patient was counseled to perform a monthly self skin examination: YES  Scribed By: Marley Calle, MS, PASimiC          Again, thank you for allowing me to participate in the care of your patient.        Sincerely,        Marley Calle PA-C

## 2020-10-29 NOTE — PATIENT INSTRUCTIONS
Preventive Health Recommendations  Female Ages 50 - 64    Yearly exam: See your health care provider every year in order to  o Review health changes.   o Discuss preventive care.    o Review your medicines if your doctor has prescribed any.      Get a Pap test every three years (unless you have an abnormal result and your provider advises testing more often).    If you get Pap tests with HPV test, you only need to test every 5 years, unless you have an abnormal result.     You do not need a Pap test if your uterus was removed (hysterectomy) and you have not had cancer.    You should be tested each year for STDs (sexually transmitted diseases) if you're at risk.     Have a mammogram every 1 to 2 years.    Have a colonoscopy at age 50, or have a yearly FIT test (stool test). These exams screen for colon cancer.      Have a cholesterol test every 5 years, or more often if advised.    Have a diabetes test (fasting glucose) every three years. If you are at risk for diabetes, you should have this test more often.     If you are at risk for osteoporosis (brittle bone disease), think about having a bone density scan (DEXA).    Shots: Get a flu shot each year. Get a tetanus shot every 10 years.    Nutrition:     Eat at least 5 servings of fruits and vegetables each day.    Eat whole-grain bread, whole-wheat pasta and brown rice instead of white grains and rice.    Get adequate Calcium and Vitamin D.     Lifestyle    Exercise at least 150 minutes a week (30 minutes a day, 5 days a week). This will help you control your weight and prevent disease.    Limit alcohol to one drink per day.    No smoking.     Wear sunscreen to prevent skin cancer.     See your dentist every six months for an exam and cleaning.    See your eye doctor every 1 to 2 years.     Initial Anesthesia Post-op Note    Patient: David R Even  Procedure(s) Performed: ABLATION ATRIAL FIB - CV; ABLATION ATRIAL FLUTTER - CV  Anesthesia type: MAC    Vitals Value Taken Time   Temp 36.5 10/29/20 1738   Pulse 58 10/29/20 1738   Resp 17 10/29/20 1738   SpO2 99 10/29/20 1738   /81 10/29/20 1738         Patient Location: PACU Phase 1  Post-op Vital Signs:stable  Level of Consciousness: awake, alert and participates in exam  Respiratory Status: spontaneous ventilation  Cardiovascular stable  Hydration: euvolemic  Pain Management: adequately controlled  Handoff: Handoff to receiving nurse was performed and questions were answered  Vomiting: none   Nausea: None  Airway Patency:patent  Post-op Assessment: awake, alert, appropriately conversant, or baseline, no complications, patient tolerated procedure well with no complications and evidence of recall      No complications documented.

## 2020-11-16 ENCOUNTER — HEALTH MAINTENANCE LETTER (OUTPATIENT)
Age: 50
End: 2020-11-16

## 2020-11-25 ENCOUNTER — OFFICE VISIT (OUTPATIENT)
Dept: FAMILY MEDICINE | Facility: CLINIC | Age: 50
End: 2020-11-25
Payer: COMMERCIAL

## 2020-11-25 VITALS
HEIGHT: 66 IN | DIASTOLIC BLOOD PRESSURE: 68 MMHG | OXYGEN SATURATION: 99 % | RESPIRATION RATE: 16 BRPM | HEART RATE: 75 BPM | BODY MASS INDEX: 24.91 KG/M2 | SYSTOLIC BLOOD PRESSURE: 100 MMHG | TEMPERATURE: 97.1 F | WEIGHT: 155 LBS

## 2020-11-25 DIAGNOSIS — Z13.220 SCREENING FOR HYPERLIPIDEMIA: ICD-10-CM

## 2020-11-25 DIAGNOSIS — Z13.0 SCREENING FOR IRON DEFICIENCY ANEMIA: ICD-10-CM

## 2020-11-25 DIAGNOSIS — Z13.1 SCREENING FOR DIABETES MELLITUS: ICD-10-CM

## 2020-11-25 DIAGNOSIS — Z00.00 ROUTINE GENERAL MEDICAL EXAMINATION AT A HEALTH CARE FACILITY: Primary | ICD-10-CM

## 2020-11-25 DIAGNOSIS — Z23 NEED FOR SHINGLES VACCINE: ICD-10-CM

## 2020-11-25 DIAGNOSIS — Z12.4 SCREENING FOR CERVICAL CANCER: ICD-10-CM

## 2020-11-25 LAB
CHOLEST SERPL-MCNC: 232 MG/DL
GLUCOSE SERPL-MCNC: 88 MG/DL (ref 70–99)
HDLC SERPL-MCNC: 57 MG/DL
HGB BLD-MCNC: 13 G/DL (ref 11.7–15.7)
LDLC SERPL CALC-MCNC: 146 MG/DL
NONHDLC SERPL-MCNC: 175 MG/DL
TRIGL SERPL-MCNC: 143 MG/DL

## 2020-11-25 PROCEDURE — 99396 PREV VISIT EST AGE 40-64: CPT | Performed by: NURSE PRACTITIONER

## 2020-11-25 PROCEDURE — 80061 LIPID PANEL: CPT | Performed by: NURSE PRACTITIONER

## 2020-11-25 PROCEDURE — 85018 HEMOGLOBIN: CPT | Performed by: NURSE PRACTITIONER

## 2020-11-25 PROCEDURE — G0145 SCR C/V CYTO,THINLAYER,RESCR: HCPCS | Performed by: NURSE PRACTITIONER

## 2020-11-25 PROCEDURE — 36415 COLL VENOUS BLD VENIPUNCTURE: CPT | Performed by: NURSE PRACTITIONER

## 2020-11-25 PROCEDURE — 87624 HPV HI-RISK TYP POOLED RSLT: CPT | Performed by: NURSE PRACTITIONER

## 2020-11-25 PROCEDURE — 82947 ASSAY GLUCOSE BLOOD QUANT: CPT | Performed by: NURSE PRACTITIONER

## 2020-11-25 RX ORDER — ACYCLOVIR 400 MG/1
TABLET ORAL
COMMUNITY
Start: 2020-09-08 | End: 2021-02-16

## 2020-11-25 ASSESSMENT — ENCOUNTER SYMPTOMS
BREAST MASS: 0
SORE THROAT: 0
PALPITATIONS: 0
NAUSEA: 0
EYE PAIN: 0
HEADACHES: 1
JOINT SWELLING: 0
FEVER: 0
HEMATURIA: 0
DIZZINESS: 0
PARESTHESIAS: 0
FREQUENCY: 1
HEARTBURN: 0
ABDOMINAL PAIN: 0
DYSURIA: 0
COUGH: 0
CHILLS: 0
HEMATOCHEZIA: 0
NERVOUS/ANXIOUS: 0
WEAKNESS: 0
ARTHRALGIAS: 0
DIARRHEA: 0
SHORTNESS OF BREATH: 0
MYALGIAS: 0
CONSTIPATION: 0

## 2020-11-25 ASSESSMENT — MIFFLIN-ST. JEOR: SCORE: 1330.86

## 2020-11-25 NOTE — PROGRESS NOTES
SUBJECTIVE:   CC: Kary Perry is an 50 year old woman who presents for preventive health visit.       Healthy Habits:     Getting at least 3 servings of Calcium per day:  Yes    Bi-annual eye exam:  Yes    Dental care twice a year:  Yes    Sleep apnea or symptoms of sleep apnea:  None    Diet:  Regular (no restrictions)    Frequency of exercise:  2-3 days/week    Duration of exercise:  30-45 minutes    Taking medications regularly:  Yes    Medication side effects:  Not applicable and Other    PHQ-2 Total Score: 0    Additional concerns today:  No      Today's PHQ-2 Score:   PHQ-2 ( 1999 Pfizer) 11/25/2020   Q1: Little interest or pleasure in doing things 0   Q2: Feeling down, depressed or hopeless 0   PHQ-2 Score 0   Q1: Little interest or pleasure in doing things Not at all   Q2: Feeling down, depressed or hopeless Not at all   PHQ-2 Score 0       Abuse: Current or Past (Physical, Sexual or Emotional) - No  Do you feel safe in your environment? Yes        Social History     Tobacco Use     Smoking status: Never Smoker     Smokeless tobacco: Never Used   Substance Use Topics     Alcohol use: Yes     Comment: 3 drinks per week     If you drink alcohol do you typically have >3 drinks per day or >7 drinks per week? No    Alcohol Use 11/25/2020   Prescreen: >3 drinks/day or >7 drinks/week? No   Prescreen: >3 drinks/day or >7 drinks/week? -   No flowsheet data found.    Reviewed orders with patient.  Reviewed health maintenance and updated orders accordingly - Yes  Lab work is in process  Labs reviewed in EPIC  BP Readings from Last 3 Encounters:   11/25/20 100/68   10/20/20 108/63   09/22/20 113/63    Wt Readings from Last 3 Encounters:   11/25/20 70.3 kg (155 lb)   08/19/20 71.7 kg (158 lb)   02/27/20 76.7 kg (169 lb)            Patient Active Problem List   Diagnosis     Undiagnosed cardiac murmurs     Temporomandibular dysfunction syndrome     Diffuse cystic mastopathy     Herpes simplex virus (HSV)  infection     DUB (dysfunctional uterine bleeding)     CARDIOVASCULAR SCREENING; LDL GOAL LESS THAN 160     Dermatitis     Dysphagia     Vitamin D deficiency     Neck sprain     Eczema     History of ovarian cyst     Cervical strain     Tension headache     Past Surgical History:   Procedure Laterality Date     HYSTEROSCOPY  10/04    D&C/hysteroscopy/polypectomy     HYSTEROSCOPY  5/08    hysteroscopy/polypectomy     HYSTEROSCOPY  11/2/09    hysteroscopy/D&C/endometrial ablation     ZZC NONSPECIFIC PROCEDURE  1993    Tonsillectomy       Social History     Tobacco Use     Smoking status: Never Smoker     Smokeless tobacco: Never Used   Substance Use Topics     Alcohol use: Yes     Comment: 3 drinks per week     Family History   Problem Relation Age of Onset     Hypertension Mother      Lipids Mother      Obesity Mother      Blood Disease Mother         low epo level     Cancer Mother 67        Lymphoma chemo and radiation     Hyperlipidemia Mother      Other Cancer Mother         Lymphoma     Other - See Comments Mother         anemia     Skin Cancer Mother      Cancer Maternal Grandmother         Melanoma/Leukemia     Osteoporosis Maternal Grandmother      C.A.D. Maternal Grandmother      Arthritis Maternal Grandmother      Obesity Maternal Grandmother      Breast Cancer Maternal Grandmother         age 60-70     Skin Cancer Maternal Grandmother      Heart Disease Paternal Grandmother      Obesity Paternal Grandmother      Blood Disease Paternal Grandmother         Used Blood Thinners     Cerebrovascular Disease Paternal Grandmother      Hypertension Paternal Grandmother      C.A.D. Paternal Grandmother      Arthritis Paternal Grandmother      Musculoskeletal Disorder Paternal Grandmother         Anti Phospho Lipid Syndrome     Cerebrovascular Disease Paternal Grandfather      Diabetes Paternal Grandfather      Heart Disease Paternal Grandfather         Heart Attack     Alcohol/Drug Father         methamphetamine      Heart Disease Father         passed away from CHF     Substance Abuse Father      Breast Cancer Other          Current Outpatient Medications   Medication Sig Dispense Refill     acyclovir (ZOVIRAX) 400 MG tablet        Calcium 600 MG tablet Take 1 tablet by mouth daily.       Cholecalciferol (VITAMIN D-3) 5000 units TABS Take 1 tablet by mouth daily 90 tablet 3     Ibuprofen 200 MG capsule Take 200-600 mg by mouth every 4 hours as needed for fever. 100 capsule 3     MULTIVITAMIN TABS   OR 2 tabs daily       Omega-3 Fatty Acids (OMEGA-3 FISH OIL PO) Take 1,200 mg by mouth daily        scopolamine (TRANSDERM) 1 MG/3DAYS 72 hr patch Apply 1 patch to hairless area behind one ear at least 4 hours before travel.  Remove old patch and change every 3 days (72 hours). 5 patch 0     VITAMIN B COMPLEX PO TABS 2,000 mg daily       vitamin C (ASCORBIC ACID) 1000 MG TABS Take 1,000 mg by mouth 2 times daily       acyclovir (ZOVIRAX) 800 MG tablet Take 1 tablet (800 mg) by mouth every 8 hours for 3 days With each cold sore outbreak. 9 tablet 11     cyclobenzaprine (FLEXERIL) 10 MG tablet Take 0.5 tablets (5 mg) by mouth 3 times daily as needed for muscle spasms (Patient not taking: Reported on 10/20/2020) 30 tablet 0     Allergies   Allergen Reactions     Eggs [Chicken-Derived Products (Egg)] Nausea     Sulfa Drugs Hives     Recent Labs   Lab Test 10/11/19  0843 05/18/18  0846 03/24/17  0839 04/05/16  0858 03/10/15  0918   A1C  --   --   --  5.7  --    * 136* 159* 129* 88   HDL 64 64 64 58 63   TRIG 98 134 117 125 91   CR  --   --   --   --  0.63   GFRESTIMATED  --   --   --   --  >90  Non  GFR Calc     GFRESTBLACK  --   --   --   --  >90  African American GFR Calc     POTASSIUM  --   --   --   --  4.0        Mammogram Screening: Patient over age 50, mutual decision to screen reflected in health maintenance.    Pertinent mammograms are reviewed under the imaging tab.  History of abnormal Pap smear:   Last 3  "Pap and HPV Results:   PAP / HPV Latest Ref Rng & Units 5/18/2018 3/10/2015 2/24/2014   PAP - NIL NIL OTHER-NIL, See Result   HPV 16 DNA NEG:Negative Negative - -   HPV 18 DNA NEG:Negative Negative - -   OTHER HR HPV NEG:Negative Negative - -     PAP / HPV Latest Ref Rng & Units 5/18/2018 3/10/2015 2/24/2014   PAP - NIL NIL OTHER-NIL, See Result   HPV 16 DNA NEG:Negative Negative - -   HPV 18 DNA NEG:Negative Negative - -   OTHER HR HPV NEG:Negative Negative - -     Reviewed and updated as needed this visit by clinical staff  Tobacco  Allergies  Meds   Med Hx  Surg Hx  Fam Hx  Soc Hx        Reviewed and updated as needed this visit by Provider                  Breast reduction:  Some scarring continues.  Getting better with time.    Shingles shot:  Needed but she does not want to do it today.       Review of Systems   Constitutional: Negative for chills and fever.   HENT: Negative for congestion, ear pain, hearing loss and sore throat.    Eyes: Negative for pain and visual disturbance.   Respiratory: Negative for cough and shortness of breath.    Cardiovascular: Negative for chest pain, palpitations and peripheral edema.   Gastrointestinal: Negative for abdominal pain, constipation, diarrhea, heartburn, hematochezia and nausea.   Breasts:  Negative for tenderness, breast mass and discharge.   Genitourinary: Positive for frequency. Negative for dysuria, genital sores, hematuria, pelvic pain, urgency, vaginal bleeding and vaginal discharge.   Musculoskeletal: Negative for arthralgias, joint swelling and myalgias.   Skin: Negative for rash.   Neurological: Positive for headaches. Negative for dizziness, weakness and paresthesias.   Psychiatric/Behavioral: Negative for mood changes. The patient is not nervous/anxious.       OBJECTIVE:   /68 (BP Location: Left arm, Patient Position: Sitting, Cuff Size: Adult Regular)   Pulse 75   Temp 97.1  F (36.2  C) (Temporal)   Resp 16   Ht 1.67 m (5' 5.75\")   Wt " 70.3 kg (155 lb)   SpO2 99%   BMI 25.21 kg/m    Physical Exam  GENERAL: healthy, alert and no distress  EYES: Eyes grossly normal to inspection, PERRL and conjunctivae and sclerae normal  HENT: ear canals and TM's normal, nose and mouth without ulcers or lesions  NECK: no adenopathy, no asymmetry, masses, or scars and thyroid normal to palpation  RESP: lungs clear to auscultation - no rales, rhonchi or wheezes  BREAST: normal without masses, tenderness or nipple discharge and no palpable axillary masses or adenopathy. Mild breast reduction scarring present.  CV: regular rate and rhythm, normal S1 S2, no S3 or S4, no murmur, click or rub, no peripheral edema and peripheral pulses strong  ABDOMEN: soft, nontender, no hepatosplenomegaly, no masses and bowel sounds normal   (female): normal female external genitalia, normal urethral meatus, vaginal mucosa pink, moist, well rugated, and normal cervix/adnexa/uterus without masses or discharge  MS: no gross musculoskeletal defects noted, no edema  SKIN: no suspicious lesions or rashes  NEURO: Normal strength and tone, mentation intact and speech normal  PSYCH: mentation appears normal, affect normal/bright    Diagnostic Test Results:  Labs reviewed in Epic  Labs ordered, results pending    ASSESSMENT/PLAN:     (Z00.00) Routine general medical examination at a health care facility  (primary encounter diagnosis)  Comment:   Plan: Pap imaged thin layer screen with HPV -         recommended age 30 - 65, HPV High Risk Types         DNA Cervical, Lipid panel reflex to direct LDL         Fasting, Hemoglobin, Glucose            (Z12.4) Screening for cervical cancer  Comment:   Plan: done today    (Z23) Need for shingles vaccine  Comment:   Plan:  I discussed and reviewed the shingles vaccine.  The patient is to expect side effects such as flu like symptoms, fatigue, rash, fever, chills, etc.  The patient was informed that in some people the side effects are significant enough  "that they feel ill and need to miss a day or two of work.  Instructed to monitor for problems.  As long as there are no signs of anaphylaxis or allergic reaction, then the patient is to return to the clinic in 2-6 months for the second and final vaccine.  The patient verbalizes understanding.  Patient will return for the vaccine due to potential side effects today.       (Z13.1) Screening for diabetes mellitus  Comment:   Plan: Glucose            (Z13.0) Screening for iron deficiency anemia  Comment:   Plan: Hemoglobin            (Z13.220) Screening for hyperlipidemia  Comment:   Plan: Lipid panel reflex to direct LDL Fasting          Patient has been advised of split billing requirements and indicates understanding: Yes  COUNSELING:  Reviewed preventive health counseling, as reflected in patient instructions    Estimated body mass index is 25.21 kg/m  as calculated from the following:    Height as of this encounter: 1.67 m (5' 5.75\").    Weight as of this encounter: 70.3 kg (155 lb).        She reports that she has never smoked. She has never used smokeless tobacco.      Counseling Resources:  ATP IV Guidelines  Pooled Cohorts Equation Calculator  Breast Cancer Risk Calculator  BRCA-Related Cancer Risk Assessment: FHS-7 Tool  FRAX Risk Assessment  ICSI Preventive Guidelines  Dietary Guidelines for Americans, 2010  USDA's MyPlate  ASA Prophylaxis  Lung CA Screening    RONY Ramirez CNP  [facility]  "

## 2020-12-01 LAB
COPATH REPORT: NORMAL
PAP: NORMAL

## 2021-01-06 ENCOUNTER — TRANSFERRED RECORDS (OUTPATIENT)
Dept: HEALTH INFORMATION MANAGEMENT | Facility: CLINIC | Age: 51
End: 2021-01-06

## 2021-01-20 PROBLEM — G44.209 TENSION HEADACHE: Status: RESOLVED | Noted: 2019-12-06 | Resolved: 2021-01-20

## 2021-01-20 PROBLEM — S16.1XXA CERVICAL STRAIN: Status: RESOLVED | Noted: 2019-12-06 | Resolved: 2021-01-20

## 2021-01-21 ENCOUNTER — ALLIED HEALTH/NURSE VISIT (OUTPATIENT)
Dept: NURSING | Facility: CLINIC | Age: 51
End: 2021-01-21
Payer: COMMERCIAL

## 2021-01-21 DIAGNOSIS — Z23 ENCOUNTER FOR IMMUNIZATION: Primary | ICD-10-CM

## 2021-01-21 PROCEDURE — 90750 HZV VACC RECOMBINANT IM: CPT

## 2021-01-21 PROCEDURE — 90471 IMMUNIZATION ADMIN: CPT

## 2021-01-21 NOTE — NURSING NOTE
Prior to immunization administration, verified patients identity using patient s name and date of birth. Please see Immunization Activity for additional information.     Screening Questionnaire for Adult Immunization    Are you sick today?   No   Do you have allergies to medications, food, a vaccine component or latex?   No   Have you ever had a serious reaction after receiving a vaccination?   No   Do you have a long-term health problem with heart, lung, kidney, or metabolic disease (e.g., diabetes), asthma, a blood disorder, no spleen, complement component deficiency, a cochlear implant, or a spinal fluid leak?  Are you on long-term aspirin therapy?   No   Do you have cancer, leukemia, HIV/AIDS, or any other immune system problem?   No   Do you have a parent, brother, or sister with an immune system problem?   No   In the past 3 months, have you taken medications that affect  your immune system, such as prednisone, other steroids, or anticancer drugs; drugs for the treatment of rheumatoid arthritis, Crohn s disease, or psoriasis; or have you had radiation treatments?   No   Have you had a seizure, or a brain or other nervous system problem?   No   During the past year, have you received a transfusion of blood or blood    products, or been given immune (gamma) globulin or antiviral drug?   No   For women: Are you pregnant or is there a chance you could become       pregnant during the next month?   No   Have you received any vaccinations in the past 4 weeks?   No     Immunization questionnaire answers were all negative.        Per orders of Dr. Deal, injection of Shingrix given by Tim Hernández. Patient instructed to remain in clinic for 15 minutes afterwards, and to report any adverse reaction to me immediately.       Screening performed by Tim Hernández on 1/21/2021 at 11:24 AM.

## 2021-02-07 ENCOUNTER — HEALTH MAINTENANCE LETTER (OUTPATIENT)
Age: 51
End: 2021-02-07

## 2021-02-08 ENCOUNTER — ANCILLARY PROCEDURE (OUTPATIENT)
Dept: MAMMOGRAPHY | Facility: CLINIC | Age: 51
End: 2021-02-08
Attending: NURSE PRACTITIONER
Payer: COMMERCIAL

## 2021-02-08 DIAGNOSIS — Z12.31 VISIT FOR SCREENING MAMMOGRAM: ICD-10-CM

## 2021-02-08 PROCEDURE — 77067 SCR MAMMO BI INCL CAD: CPT | Performed by: RADIOLOGY

## 2021-02-08 PROCEDURE — 77063 BREAST TOMOSYNTHESIS BI: CPT | Performed by: RADIOLOGY

## 2021-02-16 ENCOUNTER — TELEPHONE (OUTPATIENT)
Dept: PEDIATRICS | Facility: CLINIC | Age: 51
End: 2021-02-16

## 2021-02-16 ENCOUNTER — OFFICE VISIT (OUTPATIENT)
Dept: PEDIATRICS | Facility: CLINIC | Age: 51
End: 2021-02-16
Payer: COMMERCIAL

## 2021-02-16 VITALS
RESPIRATION RATE: 16 BRPM | DIASTOLIC BLOOD PRESSURE: 64 MMHG | SYSTOLIC BLOOD PRESSURE: 110 MMHG | BODY MASS INDEX: 26.35 KG/M2 | WEIGHT: 162 LBS | OXYGEN SATURATION: 100 % | TEMPERATURE: 98 F | HEART RATE: 73 BPM

## 2021-02-16 DIAGNOSIS — R30.0 DYSURIA: Primary | ICD-10-CM

## 2021-02-16 DIAGNOSIS — N95.2 VAGINAL ATROPHY: ICD-10-CM

## 2021-02-16 LAB
ALBUMIN UR-MCNC: NEGATIVE MG/DL
APPEARANCE UR: CLEAR
BILIRUB UR QL STRIP: NEGATIVE
COLOR UR AUTO: YELLOW
GLUCOSE UR STRIP-MCNC: NEGATIVE MG/DL
HGB UR QL STRIP: ABNORMAL
KETONES UR STRIP-MCNC: NEGATIVE MG/DL
LEUKOCYTE ESTERASE UR QL STRIP: NEGATIVE
NITRATE UR QL: NEGATIVE
PH UR STRIP: 7 PH (ref 5–7)
RBC #/AREA URNS AUTO: NORMAL /HPF
SOURCE: ABNORMAL
SP GR UR STRIP: 1.01 (ref 1–1.03)
SPECIMEN SOURCE: NORMAL
UROBILINOGEN UR STRIP-ACNC: 0.2 EU/DL (ref 0.2–1)
WBC #/AREA URNS AUTO: NORMAL /HPF
WET PREP SPEC: NORMAL

## 2021-02-16 PROCEDURE — 81001 URINALYSIS AUTO W/SCOPE: CPT | Performed by: INTERNAL MEDICINE

## 2021-02-16 PROCEDURE — 99214 OFFICE O/P EST MOD 30 MIN: CPT | Performed by: INTERNAL MEDICINE

## 2021-02-16 PROCEDURE — 87210 SMEAR WET MOUNT SALINE/INK: CPT | Performed by: INTERNAL MEDICINE

## 2021-02-16 RX ORDER — ESTRADIOL 10 UG/1
10 INSERT VAGINAL
Qty: 36 TABLET | Refills: 3 | Status: SHIPPED | OUTPATIENT
Start: 2021-02-17 | End: 2021-12-07

## 2021-02-16 RX ORDER — ESTRADIOL 10 UG/1
10 INSERT VAGINAL DAILY
Qty: 14 TABLET | Refills: 0 | Status: SHIPPED | OUTPATIENT
Start: 2021-02-16 | End: 2021-12-07

## 2021-02-16 RX ORDER — ESTRADIOL 0.1 MG/G
2 CREAM VAGINAL
Qty: 72 G | Refills: 3 | Status: SHIPPED | OUTPATIENT
Start: 2021-02-17 | End: 2022-07-20

## 2021-02-16 NOTE — PROGRESS NOTES
"Assessment & Plan     Dysuria  Possibly secondary to reduced estrogen after menopause. UA benign, wet prep negative.  Trialing vaginal estrogen and will see if this improves urinary symptoms.   - *UA reflex to Microscopic and Culture ([place] [facility] (except [place] and [place])  - Urine Microscopic  - Wet prep    Vaginal atrophy  Trial vaginal estrogen tablets, follow up if not improved.   - estradiol (VAGIFEM) 10 MCG TABS vaginal tablet; Place 1 tablet (10 mcg) vaginally daily  - estradiol (VAGIFEM) 10 MCG TABS vaginal tablet; Place 1 tablet (10 mcg) vaginally three times a week  - conjugated estrogens (PREMARIN) 0.625 MG/GM vaginal cream; Place 1 g vaginally three times a week        Return in about 2 weeks (around 3/2/2021), or if symptoms worsen or fail to improve.    Isabelle Allison MD  [facility] KATE Garcia is a 50 year old who presents for the following health issues     HPI       Genitourinary - Female  Onset/Duration: 1 month  Description:   Painful urination (Dysuria): irritated  Frequency: YES and small amounts  Blood in urine (Hematuria): no  Delay in urine (Hesitency): no  Intensity: moderate  Progression of Symptoms:  worsening  Accompanying Signs & Symptoms:  Fever/chills: no  Flank pain: YES   Nausea and vomiting: no  Vaginal symptoms: none  Abdominal/Pelvic Pain: Hemorrhoid banding and anal fissure January 2021 so pt states unsure if related to that or not  History:   History of frequent UTI s: no  History of kidney stones: no  Sexually Active: yes but not intercourse  Possibility of pregnancy: No  Precipitating or alleviating factors: pt notes a pap done 12/2020 and the noted hemorrhoid banding above  Therapies tried and outcome: Increase fluid intake within last couple days and OTC advil or tylenol - mild improvement      Pelvic exam end of the year.  Then did a \"float tank\" which was irritating to the mucosa.  Then had hemorrhoid banding with " diagnosis of an anal fissure on January 6.     Ongoing mild irritation feeling urethra/vaginal. Tried a monistat kit, it was irritating/painful.     No discharge. Irritated/red vulva.      Increased urinary frequency.  Gets up 90 min after going to bed, urinates (small quantity).  If she takes a tylenol before bed, then it's better.  Burning with soap.  Urine cloudy/strong smelling in the morning. Kidney/back mild pain for the past week, no specific side. No fevers/chills.     History of some blood in urine.     Sexually active - but more self stimulation.  No new partners. Does use new/different soap as of 3 weeks ago.     Has had endometrial ablation so uncertain about menopause timing. Been through hot flashes, stopped >1 year ago.          Review of Systems   Constitutional, HEENT, cardiovascular, pulmonary, gi and gu systems are negative, except as otherwise noted.      Objective    /64   Pulse 73   Temp 98  F (36.7  C) (Tympanic)   Resp 16   Wt 73.5 kg (162 lb)   SpO2 100%   BMI 26.35 kg/m    Body mass index is 26.35 kg/m .  Physical Exam   GENERAL: healthy, alert and no distress  EYES: Eyes grossly normal to inspection, PERRL and conjunctivae and sclerae normal  MS: no gross musculoskeletal defects noted, no edema  SKIN: no suspicious lesions or rashes  NEURO: Normal strength and tone, mentation intact and speech normal  PSYCH: mentation appears normal, affect normal/bright

## 2021-02-16 NOTE — TELEPHONE ENCOUNTER
Radha is wanting a rx for the Estrace cream sent down to the pharmacy. She does not know if she wants the cream or tablets for her maintenance prescription, so she would like both options on profile. Could the pharmacy please get a prescription?    Thank you,  Stephanie Mojica, [facility]

## 2021-05-20 ENCOUNTER — ALLIED HEALTH/NURSE VISIT (OUTPATIENT)
Dept: NURSING | Facility: CLINIC | Age: 51
End: 2021-05-20
Payer: COMMERCIAL

## 2021-05-20 DIAGNOSIS — Z23 ENCOUNTER FOR IMMUNIZATION: Primary | ICD-10-CM

## 2021-05-20 PROCEDURE — 90471 IMMUNIZATION ADMIN: CPT

## 2021-05-20 PROCEDURE — 90750 HZV VACC RECOMBINANT IM: CPT

## 2021-05-20 NOTE — NURSING NOTE
Prior to immunization administration, verified patients identity using patient s name and date of birth. Please see Immunization Activity for additional information.     Screening Questionnaire for Adult Immunization    Are you sick today?   No   Do you have allergies to medications, food, a vaccine component or latex?   No   Have you ever had a serious reaction after receiving a vaccination?   No   Do you have a long-term health problem with heart, lung, kidney, or metabolic disease (e.g., diabetes), asthma, a blood disorder, no spleen, complement component deficiency, a cochlear implant, or a spinal fluid leak?  Are you on long-term aspirin therapy?   No   Do you have cancer, leukemia, HIV/AIDS, or any other immune system problem?   No   Do you have a parent, brother, or sister with an immune system problem?   No   In the past 3 months, have you taken medications that affect  your immune system, such as prednisone, other steroids, or anticancer drugs; drugs for the treatment of rheumatoid arthritis, Crohn s disease, or psoriasis; or have you had radiation treatments?   No   Have you had a seizure, or a brain or other nervous system problem?   No   During the past year, have you received a transfusion of blood or blood    products, or been given immune (gamma) globulin or antiviral drug?   No   For women: Are you pregnant or is there a chance you could become       pregnant during the next month?   No   Have you received any vaccinations in the past 4 weeks?   No     Immunization questionnaire answers were all negative.        Per orders of Dr. Wright, injection of shingrix given by Tim Hernández. Patient instructed to remain in clinic for 15 minutes afterwards, and to report any adverse reaction to me immediately.       Screening performed by Tim Hernández on 5/20/2021 at 3:42 PM.

## 2021-09-18 ENCOUNTER — HEALTH MAINTENANCE LETTER (OUTPATIENT)
Age: 51
End: 2021-09-18

## 2021-09-21 DIAGNOSIS — B00.1 COLD SORE: ICD-10-CM

## 2021-09-23 RX ORDER — ACYCLOVIR 800 MG/1
800 TABLET ORAL EVERY 8 HOURS
Qty: 9 TABLET | Refills: 0 | Status: SHIPPED | OUTPATIENT
Start: 2021-09-23 | End: 2021-12-07

## 2021-09-23 NOTE — TELEPHONE ENCOUNTER
Routing refill request to provider for review/approval because:  --Labs not current:  Last Scr 2015.    --Last visit:  11/25/2020 Adriana    --Future Visit: none.          ,  --Please contact patient and ask to schedule an appointment to establish care with a new provider.    --A refill request for medication was sent to the provider.

## 2021-11-15 ENCOUNTER — IMMUNIZATION (OUTPATIENT)
Dept: FAMILY MEDICINE | Facility: CLINIC | Age: 51
End: 2021-11-15
Payer: COMMERCIAL

## 2021-11-15 PROCEDURE — 90682 RIV4 VACC RECOMBINANT DNA IM: CPT

## 2021-11-15 PROCEDURE — 90471 IMMUNIZATION ADMIN: CPT

## 2021-11-18 ENCOUNTER — MYC MEDICAL ADVICE (OUTPATIENT)
Dept: FAMILY MEDICINE | Facility: CLINIC | Age: 51
End: 2021-11-18
Payer: COMMERCIAL

## 2021-11-18 DIAGNOSIS — B00.1 COLD SORE: ICD-10-CM

## 2021-11-19 ENCOUNTER — IMMUNIZATION (OUTPATIENT)
Dept: NURSING | Facility: CLINIC | Age: 51
End: 2021-11-19
Payer: COMMERCIAL

## 2021-11-19 PROCEDURE — 0004A PR COVID VAC PFIZER DIL RECON 30 MCG/0.3 ML IM: CPT

## 2021-11-19 PROCEDURE — 91300 PR COVID VAC PFIZER DIL RECON 30 MCG/0.3 ML IM: CPT

## 2021-12-02 ASSESSMENT — ENCOUNTER SYMPTOMS
SORE THROAT: 0
HEMATURIA: 0
EYE PAIN: 0
ABDOMINAL PAIN: 0
PARESTHESIAS: 0
WEAKNESS: 0
JOINT SWELLING: 0
ARTHRALGIAS: 0
PALPITATIONS: 0
HEARTBURN: 1
FREQUENCY: 0
DIARRHEA: 0
SHORTNESS OF BREATH: 0
MYALGIAS: 0
BREAST MASS: 0
HEADACHES: 0
CHILLS: 0
DYSURIA: 0
DIZZINESS: 0
HEMATOCHEZIA: 0
NAUSEA: 0
NERVOUS/ANXIOUS: 0
COUGH: 0
CONSTIPATION: 0
FEVER: 0

## 2021-12-07 ENCOUNTER — OFFICE VISIT (OUTPATIENT)
Dept: FAMILY MEDICINE | Facility: CLINIC | Age: 51
End: 2021-12-07
Payer: COMMERCIAL

## 2021-12-07 VITALS
DIASTOLIC BLOOD PRESSURE: 75 MMHG | RESPIRATION RATE: 16 BRPM | OXYGEN SATURATION: 100 % | SYSTOLIC BLOOD PRESSURE: 115 MMHG | HEIGHT: 66 IN | WEIGHT: 163 LBS | TEMPERATURE: 98.1 F | HEART RATE: 81 BPM | BODY MASS INDEX: 26.2 KG/M2

## 2021-12-07 DIAGNOSIS — T75.3XXA MOTION SICKNESS, INITIAL ENCOUNTER: ICD-10-CM

## 2021-12-07 DIAGNOSIS — Z00.00 ROUTINE GENERAL MEDICAL EXAMINATION AT A HEALTH CARE FACILITY: Primary | ICD-10-CM

## 2021-12-07 DIAGNOSIS — Z11.4 SCREENING FOR HIV (HUMAN IMMUNODEFICIENCY VIRUS): ICD-10-CM

## 2021-12-07 DIAGNOSIS — Z11.59 NEED FOR HEPATITIS C SCREENING TEST: ICD-10-CM

## 2021-12-07 DIAGNOSIS — B00.1 COLD SORE: ICD-10-CM

## 2021-12-07 LAB
ERYTHROCYTE [DISTWIDTH] IN BLOOD BY AUTOMATED COUNT: 11.7 % (ref 10–15)
HBA1C MFR BLD: 5.5 % (ref 0–5.6)
HCT VFR BLD AUTO: 39.3 % (ref 35–47)
HCV AB SERPL QL IA: NONREACTIVE
HGB BLD-MCNC: 13 G/DL (ref 11.7–15.7)
HIV 1+2 AB+HIV1 P24 AG SERPL QL IA: NONREACTIVE
MCH RBC QN AUTO: 29.5 PG (ref 26.5–33)
MCHC RBC AUTO-ENTMCNC: 33.1 G/DL (ref 31.5–36.5)
MCV RBC AUTO: 89 FL (ref 78–100)
PLATELET # BLD AUTO: 342 10E3/UL (ref 150–450)
RBC # BLD AUTO: 4.41 10E6/UL (ref 3.8–5.2)
WBC # BLD AUTO: 7.2 10E3/UL (ref 4–11)

## 2021-12-07 PROCEDURE — 90715 TDAP VACCINE 7 YRS/> IM: CPT | Performed by: NURSE PRACTITIONER

## 2021-12-07 PROCEDURE — 80061 LIPID PANEL: CPT | Performed by: NURSE PRACTITIONER

## 2021-12-07 PROCEDURE — 36415 COLL VENOUS BLD VENIPUNCTURE: CPT | Performed by: NURSE PRACTITIONER

## 2021-12-07 PROCEDURE — 87389 HIV-1 AG W/HIV-1&-2 AB AG IA: CPT | Performed by: NURSE PRACTITIONER

## 2021-12-07 PROCEDURE — 80053 COMPREHEN METABOLIC PANEL: CPT | Performed by: NURSE PRACTITIONER

## 2021-12-07 PROCEDURE — 86803 HEPATITIS C AB TEST: CPT | Performed by: NURSE PRACTITIONER

## 2021-12-07 PROCEDURE — 99396 PREV VISIT EST AGE 40-64: CPT | Mod: 25 | Performed by: NURSE PRACTITIONER

## 2021-12-07 PROCEDURE — 90471 IMMUNIZATION ADMIN: CPT | Performed by: NURSE PRACTITIONER

## 2021-12-07 PROCEDURE — 85027 COMPLETE CBC AUTOMATED: CPT | Performed by: NURSE PRACTITIONER

## 2021-12-07 PROCEDURE — 84443 ASSAY THYROID STIM HORMONE: CPT | Performed by: NURSE PRACTITIONER

## 2021-12-07 PROCEDURE — 83036 HEMOGLOBIN GLYCOSYLATED A1C: CPT | Performed by: NURSE PRACTITIONER

## 2021-12-07 RX ORDER — SCOLOPAMINE TRANSDERMAL SYSTEM 1 MG/1
PATCH, EXTENDED RELEASE TRANSDERMAL
Qty: 5 PATCH | Refills: 3 | Status: SHIPPED | OUTPATIENT
Start: 2021-12-07 | End: 2023-10-19

## 2021-12-07 RX ORDER — ACYCLOVIR 800 MG/1
800 TABLET ORAL EVERY 8 HOURS
Qty: 9 TABLET | Refills: 0 | Status: SHIPPED | OUTPATIENT
Start: 2021-12-07 | End: 2022-12-08

## 2021-12-07 ASSESSMENT — ENCOUNTER SYMPTOMS
SORE THROAT: 0
HEARTBURN: 1
JOINT SWELLING: 0
PARESTHESIAS: 0
HEMATURIA: 0
SHORTNESS OF BREATH: 0
ABDOMINAL PAIN: 0
CHILLS: 0
FREQUENCY: 0
PALPITATIONS: 0
DYSURIA: 0
WEAKNESS: 0
DIZZINESS: 0
NERVOUS/ANXIOUS: 0
MYALGIAS: 0
HEMATOCHEZIA: 0
FEVER: 0
NAUSEA: 0
COUGH: 0
EYE PAIN: 0
BREAST MASS: 0
CONSTIPATION: 0
HEADACHES: 0
DIARRHEA: 0
ARTHRALGIAS: 0

## 2021-12-07 ASSESSMENT — MIFFLIN-ST. JEOR: SCORE: 1366.11

## 2021-12-07 NOTE — PROGRESS NOTES
SUBJECTIVE:   CC: Kary Perry is an 51 year old woman who presents for preventive health visit.       Patient has been advised of split billing requirements and indicates understanding: Yes  Healthy Habits:     Getting at least 3 servings of Calcium per day:  Yes    Bi-annual eye exam:  Yes    Dental care twice a year:  Yes    Sleep apnea or symptoms of sleep apnea:  None    Diet:  Regular (no restrictions)    Frequency of exercise:  4-5 days/week    Duration of exercise:  30-45 minutes    Taking medications regularly:  Yes    Medication side effects:  Not applicable    PHQ-2 Total Score: 0    Additional concerns today:  No          Today's PHQ-2 Score:   PHQ-2 ( 1999 Pfizer) 12/2/2021   Q1: Little interest or pleasure in doing things 0   Q2: Feeling down, depressed or hopeless 0   PHQ-2 Score 0   PHQ-2 Total Score (12-17 Years)- Positive if 3 or more points; Administer PHQ-A if positive -   Q1: Little interest or pleasure in doing things Not at all   Q2: Feeling down, depressed or hopeless Not at all   PHQ-2 Score 0       Abuse: Current or Past (Physical, Sexual or Emotional) - No  Do you feel safe in your environment? Yes    Have you ever done Advance Care Planning? (For example, a Health Directive, POLST, or a discussion with a medical provider or your loved ones about your wishes): No, advance care planning information given to patient to review.  Patient plans to discuss their wishes with loved ones or provider.      Social History     Tobacco Use     Smoking status: Never Smoker     Smokeless tobacco: Never Used   Substance Use Topics     Alcohol use: Yes     Comment: 3 drinks per week       Alcohol Use 12/2/2021   Prescreen: >3 drinks/day or >7 drinks/week? No   Prescreen: >3 drinks/day or >7 drinks/week? -       Reviewed orders with patient.  Reviewed health maintenance and updated orders accordingly - Yes  Lab work is in process    Breast Cancer Screening:    FHS-7:   Breast CA Risk Assessment  (FHS-7) 12/2/2021   Did any of your first-degree relatives have breast or ovarian cancer? No   Did any of your relatives have bilateral breast cancer? No   Did any man in your family have breast cancer? No   Did any woman in your family have breast and ovarian cancer? Yes   Did any woman in your family have breast cancer before age 50 y? No   Do you have 2 or more relatives with breast and/or ovarian cancer? No   Do you have 2 or more relatives with breast and/or bowel cancer? Yes         Pertinent mammograms are reviewed under the imaging tab.    History of abnormal Pap smear: NO - age 30-65 PAP every 5 years with negative HPV co-testing recommended  PAP / HPV Latest Ref Rng & Units 11/25/2020 5/18/2018 3/10/2015   PAP (Historical) - NIL NIL NIL   HPV16 NEG:Negative Negative Negative -   HPV18 NEG:Negative Negative Negative -   HRHPV NEG:Negative Negative Negative -     Reviewed and updated as needed this visit by clinical staff  Tobacco  Allergies  Meds  Problems  Med Hx  Surg Hx  Fam Hx     Feeling more stressed lately with work.  Reflux bothering more with stress.  Had in the past, tried omeprazole.  For months.  Certain foods triggers, managing by avoiding triggering foods.     Improved vaginal dryness with Estrace cream.  No hot flashes.  Occasional insomnia or waking during sleep.    Reviewed and updated as needed this visit by Provider  Tobacco  Allergies  Meds  Problems  Med Hx  Surg Hx  Fam Hx            Review of Systems   Constitutional: Negative for chills and fever.   HENT: Negative for congestion, ear pain, hearing loss and sore throat.    Eyes: Negative for pain and visual disturbance.   Respiratory: Negative for cough and shortness of breath.    Cardiovascular: Negative for chest pain, palpitations and peripheral edema.   Gastrointestinal: Positive for heartburn. Negative for abdominal pain, constipation, diarrhea, hematochezia and nausea.   Breasts:  Negative for tenderness, breast  "mass and discharge.   Genitourinary: Negative for dysuria, frequency, genital sores, hematuria, pelvic pain, urgency, vaginal bleeding and vaginal discharge.   Musculoskeletal: Negative for arthralgias, joint swelling and myalgias.   Skin: Negative for rash.   Neurological: Negative for dizziness, weakness, headaches and paresthesias.   Psychiatric/Behavioral: Negative for mood changes. The patient is not nervous/anxious.      CONSTITUTIONAL: NEGATIVE for fever, chills, change in weight  INTEGUMENTARY/SKIN: NEGATIVE for worrisome rashes, moles or lesions  EYES: NEGATIVE for vision changes or irritation  ENT: NEGATIVE for ear, mouth and throat problems  RESP: NEGATIVE for significant cough or SOB  BREAST: NEGATIVE for masses, tenderness or discharge  CV: NEGATIVE for chest pain, palpitations or peripheral edema  GI: NEGATIVE for nausea, abdominal pain, heartburn, or change in bowel habits  : NEGATIVE for unusual urinary or vaginal symptoms. No vaginal bleeding.  MUSCULOSKELETAL: NEGATIVE for significant arthralgias or myalgia  NEURO: NEGATIVE for weakness, dizziness or paresthesias,  Occasional flare up near old shingles site that resolves spontaneously.  PSYCHIATRIC: NEGATIVE for changes in mood or affect      OBJECTIVE:   /75   Pulse 81   Temp 98.1  F (36.7  C)   Resp 16   Ht 1.676 m (5' 6\")   Wt 73.9 kg (163 lb)   SpO2 100%   Breastfeeding No   BMI 26.31 kg/m    Physical Exam  GENERAL: healthy, alert and no distress  EYES: Eyes grossly normal to inspection, PERRL and conjunctivae and sclerae normal  HENT: ear canals and TM's normal, nose and mouth without ulcers or lesions  NECK: no adenopathy, no asymmetry, masses, or scars and thyroid normal to palpation  RESP: lungs clear to auscultation - no rales, rhonchi or wheezes  BREAST: normal without masses, tenderness or nipple discharge and no palpable axillary masses or adenopathy  CV: regular rate and rhythm, normal S1 S2, no S3 or S4, no murmur, " "click or rub, no peripheral edema and peripheral pulses strong  ABDOMEN: soft, nontender, no hepatosplenomegaly, no masses and bowel sounds normal  MS: no gross musculoskeletal defects noted, no edema  SKIN: no suspicious lesions or rashes  NEURO: Normal strength and tone, mentation intact and speech normal  PSYCH: mentation appears normal, affect normal/bright    Diagnostic Test Results:  Labs reviewed in Epic    ASSESSMENT/PLAN:   (Z00.00) Routine general medical examination at a health care facility  (primary encounter diagnosis)  Comment:   Plan: CBC with platelets, Comprehensive metabolic         panel (BMP + Alb, Alk Phos, ALT, AST, Total.         Bili, TP), Lipid panel reflex to direct LDL         Fasting, TSH with free T4 reflex, Hemoglobin         A1c, GLUCOSE, TDAP VACCINE (Adacel, Boostrix)          [[id number]]          (B00.1) Cold sore  Comment: Uses as needed..  Refills provided  Plan: acyclovir (ZOVIRAX) 800 MG tablet       (T75.3XXA) Motion sickness, initial encounter  Comment: Uses on a as needed when sailing.  Refills provided  Plan: scopolamine (TRANSDERM) 1 MG/3DAYS 72 hr patch        (Z11.59) Need for hepatitis C screening test  Comment:   Plan: Hepatitis C Screen Reflex to HCV RNA Quant and         Genotype           (Z11.4) Screening for HIV (human immunodeficiency virus)  Comment:   Plan: HIV Antigen Antibody Combo            Patient has been advised of split billing requirements and indicates understanding: Yes  COUNSELING:  Reviewed preventive health counseling, as reflected in patient instructions    Estimated body mass index is 26.31 kg/m  as calculated from the following:    Height as of this encounter: 1.676 m (5' 6\").    Weight as of this encounter: 73.9 kg (163 lb).    Weight management plan: Discussed healthy diet and exercise guidelines    She reports that she has never smoked. She has never used smokeless tobacco.      Counseling Resources:  ATP IV Guidelines  Pooled Cohorts Equation " Calculator  Breast Cancer Risk Calculator  BRCA-Related Cancer Risk Assessment: FHS-7 Tool  FRAX Risk Assessment  ICSI Preventive Guidelines  Dietary Guidelines for Americans, 2010  USDA's MyPlate  ASA Prophylaxis  Lung CA Screening    Alysha Marsh NP  [facility]

## 2021-12-07 NOTE — NURSING NOTE
Prior to immunization administration, verified patients identity using patient s name and date of birth. Please see Immunization Activity for additional information.     Screening Questionnaire for Adult Immunization    Are you sick today?   No   Do you have allergies to medications, food, a vaccine component or latex?   No   Have you ever had a serious reaction after receiving a vaccination?   No   Do you have a long-term health problem with heart, lung, kidney, or metabolic disease (e.g., diabetes), asthma, a blood disorder, no spleen, complement component deficiency, a cochlear implant, or a spinal fluid leak?  Are you on long-term aspirin therapy?   No   Do you have cancer, leukemia, HIV/AIDS, or any other immune system problem?   No   Do you have a parent, brother, or sister with an immune system problem?   No   In the past 3 months, have you taken medications that affect  your immune system, such as prednisone, other steroids, or anticancer drugs; drugs for the treatment of rheumatoid arthritis, Crohn s disease, or psoriasis; or have you had radiation treatments?   No   Have you had a seizure, or a brain or other nervous system problem?   No   During the past year, have you received a transfusion of blood or blood    products, or been given immune (gamma) globulin or antiviral drug?   No   For women: Are you pregnant or is there a chance you could become       pregnant during the next month?   No   Have you received any vaccinations in the past 4 weeks?   No     Immunization questionnaire answers were all negative.        Per orders of Alysha Marsh NP, injection of TDAP given by Tim Hernández. Patient instructed to remain in clinic for 15 minutes afterwards, and to report any adverse reaction to me immediately.       Screening performed by Tim Hernández on 12/7/2021 at 9:43 AM.

## 2021-12-07 NOTE — NURSING NOTE
Pt had difficult blood draw. Hearing faded, vision faded . RN was called to lab      Approx 950 AM  BP 71/41 P 67 Sats 100% Pt able to converse fully    10 :03 AM  BP 82/52  P 67    10:16 AM  BP 99/64  P 67    Pt felt well enough to walk and RN helped her to exam room. She will lay down and lab personnel will attempt to draw again    Alysha aware of situation    Chary Reyes RN, BSN  [facility]

## 2021-12-08 LAB
ALBUMIN SERPL-MCNC: 3.6 G/DL (ref 3.4–5)
ALP SERPL-CCNC: 63 U/L (ref 40–150)
ALT SERPL W P-5'-P-CCNC: 28 U/L (ref 0–50)
ANION GAP SERPL CALCULATED.3IONS-SCNC: 8 MMOL/L (ref 3–14)
AST SERPL W P-5'-P-CCNC: 24 U/L (ref 0–45)
BILIRUB SERPL-MCNC: 0.4 MG/DL (ref 0.2–1.3)
BUN SERPL-MCNC: 12 MG/DL (ref 7–30)
CALCIUM SERPL-MCNC: 9 MG/DL (ref 8.5–10.1)
CHLORIDE BLD-SCNC: 106 MMOL/L (ref 94–109)
CO2 SERPL-SCNC: 22 MMOL/L (ref 20–32)
CREAT SERPL-MCNC: 0.59 MG/DL (ref 0.52–1.04)
FASTING STATUS PATIENT QL REPORTED: YES
GFR SERPL CREATININE-BSD FRML MDRD: >90 ML/MIN/1.73M2
GLUCOSE BLD-MCNC: 125 MG/DL (ref 70–99)
GLUCOSE BLD-MCNC: 125 MG/DL (ref 70–99)
POTASSIUM BLD-SCNC: 4 MMOL/L (ref 3.4–5.3)
PROT SERPL-MCNC: 7.6 G/DL (ref 6.8–8.8)
SODIUM SERPL-SCNC: 136 MMOL/L (ref 133–144)
TSH SERPL DL<=0.005 MIU/L-ACNC: 1.67 MU/L (ref 0.4–4)

## 2021-12-13 RX ORDER — ACYCLOVIR 800 MG/1
800 TABLET ORAL EVERY 8 HOURS
Qty: 9 TABLET | Refills: 3 | Status: SHIPPED | OUTPATIENT
Start: 2021-12-13 | End: 2023-09-08

## 2021-12-13 NOTE — TELEPHONE ENCOUNTER
Alysha-Please review and sign if agree:  1. Patient requests refills on Acyclovir prescription-pended for 3 refills  2. Patient reports eating cookies and apple juice during blood draw due to hypotension (vasovagal response?) and wonders if this influenced glucose levels?  2. Patient will be informed messaging the team for KOFI Marsh CNP, should be an option through [employer] and if not, please contact [employer] help line      Thank you!  LEIGH ANN DeanN, RN  [facility]

## 2021-12-15 LAB
CHOLEST SERPL-MCNC: 237 MG/DL
FASTING STATUS PATIENT QL REPORTED: YES
HDLC SERPL-MCNC: 66 MG/DL
LDLC SERPL CALC-MCNC: 147 MG/DL
NONHDLC SERPL-MCNC: 171 MG/DL
TRIGL SERPL-MCNC: 121 MG/DL

## 2022-02-22 ENCOUNTER — DOCUMENTATION ONLY (OUTPATIENT)
Dept: OTHER | Facility: CLINIC | Age: 52
End: 2022-02-22
Payer: COMMERCIAL

## 2022-04-30 ENCOUNTER — HEALTH MAINTENANCE LETTER (OUTPATIENT)
Age: 52
End: 2022-04-30

## 2022-05-11 ENCOUNTER — IMMUNIZATION (OUTPATIENT)
Dept: NURSING | Facility: CLINIC | Age: 52
End: 2022-05-11
Payer: COMMERCIAL

## 2022-05-11 PROCEDURE — 91305 COVID-19,PF,PFIZER (12+ YRS): CPT

## 2022-05-11 PROCEDURE — 0054A COVID-19,PF,PFIZER (12+ YRS): CPT

## 2022-05-15 ENCOUNTER — MYC MEDICAL ADVICE (OUTPATIENT)
Dept: FAMILY MEDICINE | Facility: CLINIC | Age: 52
End: 2022-05-15
Payer: COMMERCIAL

## 2022-05-17 NOTE — TELEPHONE ENCOUNTER
Reason for Call:  Form, our goal is to have forms completed with 72 hours, however, some forms may require a visit or additional information.    Type of letter, form or note:  medical    Who is the form from?: Patient    Where did the form come from: Patient or family brought in       What clinic location was the form placed at?: [facility]    Where the form was placed: [employer] form folder POD C    What number is listed as a contact on the form?: N/A       Additional comments: Advise if an appointment is needed to complete-    Call taken on 5/17/2022 at 8:38 AM by Andree Burgos

## 2022-07-17 DIAGNOSIS — N95.2 VAGINAL ATROPHY: ICD-10-CM

## 2022-07-20 NOTE — TELEPHONE ENCOUNTER
Routing refill request to provider for review/approval because:  --estradiol (ESTRACE) 0.1 MG/GM vaginal cream is not active in chart but does not look like it was discontinued.      --Last Written Prescription Date:     Disp Refills Start End TAYLOR   estradiol (ESTRACE) 0.1 MG/GM vaginal cream 72 g 3 2/17/2021 1/19/2022 --   Sig - Route: Place 2 g vaginally three times a week -         --Last visit:  12/7/2021 EDUARDO for ISAURO.    --Future Visit: 8/11/22 EDUARDO.

## 2022-07-21 RX ORDER — ESTRADIOL 0.1 MG/G
2 CREAM VAGINAL
Qty: 72 G | Refills: 1 | Status: SHIPPED | OUTPATIENT
Start: 2022-07-22 | End: 2022-08-17

## 2022-08-15 ENCOUNTER — MYC MEDICAL ADVICE (OUTPATIENT)
Dept: FAMILY MEDICINE | Facility: CLINIC | Age: 52
End: 2022-08-15

## 2022-08-15 DIAGNOSIS — N95.2 VAGINAL ATROPHY: ICD-10-CM

## 2022-08-17 RX ORDER — ESTRADIOL 0.1 MG/G
2 CREAM VAGINAL
Qty: 72 G | Refills: 1 | Status: SHIPPED | OUTPATIENT
Start: 2022-08-17 | End: 2023-10-30

## 2022-08-17 NOTE — TELEPHONE ENCOUNTER
See 8/15/22 refill encounter.    Writer responded via [employer].    LEIGH ANN DeanN, RN  [facility]

## 2022-08-17 NOTE — TELEPHONE ENCOUNTER
On 7/22/22, KOFI Marsh CNP, ordered estradiol (ESTRACE) 0.1 MG/GM vaginal cream and order was sent to a local pharmacy.  Per 8/15/22 MyChart encounter, patient would like medication refilled through [facility].    Order sent.    MOUNA Dean, RN  [facility]

## 2022-08-24 NOTE — LETTER
9/22/2020         RE: Kary Perry  [address] [address]        Dear Colleague,    Thank you for referring your patient, Kary Perry, to the [facility]. Please see a copy of my visit note below.    HPI:   Chief complaints: Kary Perry is a 50 year old female who presents for recheck of a wart on the foot. Improved after LN2 and IL candin.    Condition present for:  years.   Previous treatments include: LN2 and topical sal acid OTC    Review Of Systems  Eyes: negative  Ears/Nose/Throat: negative  Respiratory: No shortness of breath, dyspnea on exertion, cough, or hemoptysis  Cardiovascular: negative  Gastrointestinal: negative  Genitourinary: negative  Musculoskeletal: negative  Neurologic: negative  Psychiatric: negative  Skin: positive for wart    HPI, past medical history, social history, allergies, medications reviewed as of September 22, 2020      PHYSICAL EXAM:    /63   Pulse 91   SpO2 98%   Skin exam performed as follows: Type 2 skin. Mood appropriate  Alert and Oriented X 3. Well developed, well nourished in no distress.  General appearance: Normal  Head including face: Normal  Eyes: conjunctiva and lids: Normal  Mouth: Lips, teeth, gums: Normal  Neck: Normal  Chest-breast/axillae: Normal  Back: Normal  Spleen and liver: Normal  Cardiovascular: Exam of peripheral vascular system by observation for swelling, varicosities, edema: Normal  Genitalia: groin, buttocks: Normal  Extremities: digits/nails (clubbing): Normal  Eccrine and Apocrine glands: Normal  Right upper extremity: Normal  Left upper extremity: Normal  Right lower extremity: Normal  Left lower extremity: Normal  Skin: Scalp and body hair: See below    1. Verrucous plaques on the right plantar surface x 2    ASSESSMENT/PLAN:     1. Warts on the right plantar surface - still present. Discussed excision vs continuing LN2 and IL candin.   --Cryosurgery  performed. Advised on blistering and post op care.   --Candin injected to right plantar surface. A total of 0.3 cc used. Pt tolerated well, no complications. Advised that there will be mild tenderness for the next few days; call if area is very tender, red or swollen.           Follow-up: 3-4 weeks  CC:   Scribed By: Marley Calle, MS, [place]        Again, thank you for allowing me to participate in the care of your patient.        Sincerely,        Marley Calle PA-C     Detail Level: Zone Patient counseled, sunblock and sun avoidance recommended, and monitor skin for any changes.

## 2022-09-06 ENCOUNTER — LAB (OUTPATIENT)
Dept: URGENT CARE | Facility: URGENT CARE | Age: 52
End: 2022-09-06
Attending: FAMILY MEDICINE
Payer: COMMERCIAL

## 2022-09-06 DIAGNOSIS — Z20.822 SUSPECTED 2019 NOVEL CORONAVIRUS INFECTION: ICD-10-CM

## 2022-09-06 LAB — SARS-COV-2 RNA RESP QL NAA+PROBE: NEGATIVE

## 2022-09-06 PROCEDURE — U0005 INFEC AGEN DETEC AMPLI PROBE: HCPCS

## 2022-09-06 PROCEDURE — U0003 INFECTIOUS AGENT DETECTION BY NUCLEIC ACID (DNA OR RNA); SEVERE ACUTE RESPIRATORY SYNDROME CORONAVIRUS 2 (SARS-COV-2) (CORONAVIRUS DISEASE [COVID-19]), AMPLIFIED PROBE TECHNIQUE, MAKING USE OF HIGH THROUGHPUT TECHNOLOGIES AS DESCRIBED BY CMS-2020-01-R: HCPCS

## 2022-09-30 ENCOUNTER — IMMUNIZATION (OUTPATIENT)
Dept: NURSING | Facility: CLINIC | Age: 52
End: 2022-09-30
Payer: COMMERCIAL

## 2022-09-30 PROCEDURE — 0124A COVID-19,PF,PFIZER BOOSTER BIVALENT: CPT

## 2022-09-30 PROCEDURE — 91312 COVID-19,PF,PFIZER BOOSTER BIVALENT: CPT

## 2022-10-31 ENCOUNTER — ANCILLARY PROCEDURE (OUTPATIENT)
Dept: MAMMOGRAPHY | Facility: CLINIC | Age: 52
End: 2022-10-31
Attending: NURSE PRACTITIONER
Payer: COMMERCIAL

## 2022-10-31 DIAGNOSIS — Z12.31 VISIT FOR SCREENING MAMMOGRAM: ICD-10-CM

## 2022-10-31 PROCEDURE — 77063 BREAST TOMOSYNTHESIS BI: CPT | Mod: TC | Performed by: RADIOLOGY

## 2022-10-31 PROCEDURE — 77067 SCR MAMMO BI INCL CAD: CPT | Mod: TC | Performed by: RADIOLOGY

## 2022-11-10 ENCOUNTER — IMMUNIZATION (OUTPATIENT)
Dept: NURSING | Facility: CLINIC | Age: 52
End: 2022-11-10
Payer: COMMERCIAL

## 2022-11-10 PROCEDURE — 90682 RIV4 VACC RECOMBINANT DNA IM: CPT

## 2022-11-10 PROCEDURE — 90471 IMMUNIZATION ADMIN: CPT

## 2022-12-08 ENCOUNTER — OFFICE VISIT (OUTPATIENT)
Dept: FAMILY MEDICINE | Facility: CLINIC | Age: 52
End: 2022-12-08
Payer: COMMERCIAL

## 2022-12-08 VITALS
HEART RATE: 64 BPM | RESPIRATION RATE: 14 BRPM | WEIGHT: 170 LBS | TEMPERATURE: 97.2 F | SYSTOLIC BLOOD PRESSURE: 112 MMHG | HEIGHT: 66 IN | DIASTOLIC BLOOD PRESSURE: 64 MMHG | OXYGEN SATURATION: 100 % | BODY MASS INDEX: 27.32 KG/M2

## 2022-12-08 DIAGNOSIS — Z00.00 ROUTINE GENERAL MEDICAL EXAMINATION AT A HEALTH CARE FACILITY: Primary | ICD-10-CM

## 2022-12-08 DIAGNOSIS — Z13.29 SCREENING FOR THYROID DISORDER: ICD-10-CM

## 2022-12-08 DIAGNOSIS — Z13.1 SCREENING FOR DIABETES MELLITUS: ICD-10-CM

## 2022-12-08 DIAGNOSIS — Z13.0 SCREENING FOR IRON DEFICIENCY ANEMIA: ICD-10-CM

## 2022-12-08 DIAGNOSIS — Z13.6 CARDIOVASCULAR SCREENING; LDL GOAL LESS THAN 130: ICD-10-CM

## 2022-12-08 LAB
ALBUMIN SERPL BCG-MCNC: 4.5 G/DL (ref 3.5–5.2)
ALP SERPL-CCNC: 72 U/L (ref 35–104)
ALT SERPL W P-5'-P-CCNC: 25 U/L (ref 10–35)
ANION GAP SERPL CALCULATED.3IONS-SCNC: 11 MMOL/L (ref 7–15)
AST SERPL W P-5'-P-CCNC: 31 U/L (ref 10–35)
BILIRUB SERPL-MCNC: 0.4 MG/DL
BUN SERPL-MCNC: 11.6 MG/DL (ref 6–20)
CALCIUM SERPL-MCNC: 10 MG/DL (ref 8.6–10)
CHLORIDE SERPL-SCNC: 105 MMOL/L (ref 98–107)
CHOLEST SERPL-MCNC: 243 MG/DL
CREAT SERPL-MCNC: 0.58 MG/DL (ref 0.51–0.95)
DEPRECATED HCO3 PLAS-SCNC: 25 MMOL/L (ref 22–29)
ERYTHROCYTE [DISTWIDTH] IN BLOOD BY AUTOMATED COUNT: 12.2 % (ref 10–15)
GFR SERPL CREATININE-BSD FRML MDRD: >90 ML/MIN/1.73M2
GLUCOSE SERPL-MCNC: 100 MG/DL (ref 70–99)
HCT VFR BLD AUTO: 38.8 % (ref 35–47)
HDLC SERPL-MCNC: 63 MG/DL
HGB BLD-MCNC: 12.8 G/DL (ref 11.7–15.7)
LDLC SERPL CALC-MCNC: 142 MG/DL
MCH RBC QN AUTO: 29.4 PG (ref 26.5–33)
MCHC RBC AUTO-ENTMCNC: 33 G/DL (ref 31.5–36.5)
MCV RBC AUTO: 89 FL (ref 78–100)
NONHDLC SERPL-MCNC: 180 MG/DL
PLATELET # BLD AUTO: 362 10E3/UL (ref 150–450)
POTASSIUM SERPL-SCNC: 4.7 MMOL/L (ref 3.4–5.3)
PROT SERPL-MCNC: 7.3 G/DL (ref 6.4–8.3)
RBC # BLD AUTO: 4.36 10E6/UL (ref 3.8–5.2)
SODIUM SERPL-SCNC: 141 MMOL/L (ref 136–145)
TRIGL SERPL-MCNC: 191 MG/DL
TSH SERPL DL<=0.005 MIU/L-ACNC: 2.46 UIU/ML (ref 0.3–4.2)
WBC # BLD AUTO: 8 10E3/UL (ref 4–11)

## 2022-12-08 PROCEDURE — 84443 ASSAY THYROID STIM HORMONE: CPT | Performed by: NURSE PRACTITIONER

## 2022-12-08 PROCEDURE — 36415 COLL VENOUS BLD VENIPUNCTURE: CPT | Performed by: NURSE PRACTITIONER

## 2022-12-08 PROCEDURE — 80053 COMPREHEN METABOLIC PANEL: CPT | Performed by: NURSE PRACTITIONER

## 2022-12-08 PROCEDURE — 99396 PREV VISIT EST AGE 40-64: CPT | Performed by: NURSE PRACTITIONER

## 2022-12-08 PROCEDURE — 80061 LIPID PANEL: CPT | Performed by: NURSE PRACTITIONER

## 2022-12-08 PROCEDURE — 85027 COMPLETE CBC AUTOMATED: CPT | Performed by: NURSE PRACTITIONER

## 2022-12-08 ASSESSMENT — ENCOUNTER SYMPTOMS
PARESTHESIAS: 0
CHILLS: 0
CONSTIPATION: 0
FEVER: 0
HEADACHES: 0
DYSURIA: 0
HEARTBURN: 0
NAUSEA: 0
BREAST MASS: 0
DIZZINESS: 0
NERVOUS/ANXIOUS: 0
SORE THROAT: 0
EYE PAIN: 0
FREQUENCY: 0
HEMATURIA: 0
JOINT SWELLING: 0
DIARRHEA: 0
COUGH: 0
HEMATOCHEZIA: 0
SHORTNESS OF BREATH: 0
ARTHRALGIAS: 0
MYALGIAS: 0
ABDOMINAL PAIN: 0
PALPITATIONS: 0

## 2022-12-08 ASSESSMENT — PAIN SCALES - GENERAL: PAINLEVEL: NO PAIN (0)

## 2022-12-08 NOTE — PROGRESS NOTES
SUBJECTIVE:   CC: Radha is an 52 year old who presents for preventive health visit.   Patient has been advised of split billing requirements and indicates understanding: Yes  Healthy Habits:     Getting at least 3 servings of Calcium per day:  Yes    Bi-annual eye exam:  Yes    Dental care twice a year:  Yes    Sleep apnea or symptoms of sleep apnea:  None    Diet:  Regular (no restrictions)    Frequency of exercise:  4-5 days/week    Duration of exercise:  15-30 minutes    Taking medications regularly:  Not Applicable    Medication side effects:  Not applicable    PHQ-2 Total Score: 2    Additional concerns today:  No          PROBLEMS TO ADD ON...  Step- mom passed away a week and half ago, feeling sad about that.      Today's PHQ-2 Score:   PHQ-2 ( 1999 [employer]) 12/8/2022   Q1: Little interest or pleasure in doing things 1   Q2: Feeling down, depressed or hopeless 1   PHQ-2 Score 2   PHQ-2 Total Score (12-17 Years)- Positive if 3 or more points; Administer PHQ-A if positive -   Q1: Little interest or pleasure in doing things Not at all   Q2: Feeling down, depressed or hopeless Not at all   PHQ-2 Score 0           Social History     Tobacco Use     Smoking status: Never     Smokeless tobacco: Never   Substance Use Topics     Alcohol use: Yes     Comment: 1-2 per week     If you drink alcohol do you typically have >3 drinks per day or >7 drinks per week? No    Alcohol Use 12/8/2022   Prescreen: >3 drinks/day or >7 drinks/week? No   Prescreen: >3 drinks/day or >7 drinks/week? -       Reviewed orders with patient.  Reviewed health maintenance and updated orders accordingly - Yes  Lab work is in process    Breast Cancer Screening:    Breast CA Risk Assessment (FHS-7) 12/2/2021 12/8/2022   Do you have a family history of breast, colon, or ovarian cancer? Yes No / Unknown         Mammogram Screening: Recommended annual mammography  Pertinent mammograms are reviewed under the imaging tab.    History of abnormal Pap  "smear: NO - age 30-65 PAP every 5 years with negative HPV co-testing recommended  PAP / HPV Latest Ref Rng & Units 11/25/2020 5/18/2018 3/10/2015   PAP (Historical) - NIL NIL NIL   HPV16 NEG:Negative Negative Negative -   HPV18 NEG:Negative Negative Negative -   HRHPV NEG:Negative Negative Negative -     Reviewed and updated as needed this visit by clinical staff   Tobacco  Allergies  Meds              Reviewed and updated as needed this visit by Provider                     Review of Systems   Constitutional: Negative for chills and fever.   HENT: Negative for congestion, ear pain, hearing loss and sore throat.    Eyes: Negative for pain and visual disturbance.   Respiratory: Negative for cough and shortness of breath.    Cardiovascular: Negative for chest pain, palpitations and peripheral edema.   Gastrointestinal: Negative for abdominal pain, constipation, diarrhea, heartburn, hematochezia and nausea.   Breasts:  Negative for tenderness, breast mass and discharge.   Genitourinary: Negative for dysuria, frequency, genital sores, hematuria, pelvic pain, urgency, vaginal bleeding and vaginal discharge.   Musculoskeletal: Negative for arthralgias, joint swelling and myalgias.   Skin: Negative for rash.   Neurological: Negative for dizziness, headaches and paresthesias.   Psychiatric/Behavioral: Negative for mood changes. The patient is not nervous/anxious.           OBJECTIVE:   /64 (BP Location: Right arm, Patient Position: Chair, Cuff Size: Adult Regular)   Pulse 64   Temp 97.2  F (36.2  C) (Temporal)   Resp 14   Ht 1.67 m (5' 5.75\")   Wt 77.1 kg (170 lb)   SpO2 100%   BMI 27.65 kg/m    Physical Exam  GENERAL APPEARANCE: healthy, alert and no distress  EYES: Eyes grossly normal to inspection, PERRL and conjunctivae and sclerae normal  HENT: ear canals and TM's normal, nose and mouth without ulcers or lesions, oropharynx clear and oral mucous membranes moist  NECK: no adenopathy, no asymmetry, " masses, or scars and thyroid normal to palpation  RESP: lungs clear to auscultation - no rales, rhonchi or wheezes  BREAST: normal without masses, tenderness or nipple discharge and no palpable axillary masses or adenopathy.  Well healed breast reduction scars.  CV: regular rate and rhythm, normal S1 S2, no S3 or S4, no murmur, click or rub, no peripheral edema and peripheral pulses strong  ABDOMEN: soft, nontender, no hepatosplenomegaly, no masses and bowel sounds normal  MS: no musculoskeletal defects are noted and gait is age appropriate without ataxia  SKIN: no suspicious lesions or rashes  NEURO: Normal strength and tone, sensory exam grossly normal, mentation intact and speech normal  PSYCH: mentation appears normal and affect normal/bright        ASSESSMENT/PLAN:   (Z00.00) Routine general medical examination at a health care facility  (primary encounter diagnosis)  Comment: Reviewed medical/social/family history and health maintenance  Plan:     (Z13.6) CARDIOVASCULAR SCREENING; LDL GOAL LESS THAN 130  Comment:   Plan: Lipid panel reflex to direct LDL Fasting            (Z13.1) Screening for diabetes mellitus  Comment:   Plan: Comprehensive metabolic panel (BMP + Alb, Alk         Phos, ALT, AST, Total. Bili, TP)            (Z13.29) Screening for thyroid disorder  Comment:   Plan: TSH with free T4 reflex            (Z13.0) Screening for iron deficiency anemia  Comment:   Plan: CBC with platelets              Patient has been advised of split billing requirements and indicates understanding: Yes      COUNSELING:  Reviewed preventive health counseling, as reflected in patient instructions        She reports that she has never smoked. She has never used smokeless tobacco.          RONY Jones [facility]

## 2023-04-02 ENCOUNTER — OFFICE VISIT (OUTPATIENT)
Dept: URGENT CARE | Facility: URGENT CARE | Age: 53
End: 2023-04-02
Payer: COMMERCIAL

## 2023-04-02 VITALS
SYSTOLIC BLOOD PRESSURE: 100 MMHG | BODY MASS INDEX: 27.32 KG/M2 | HEIGHT: 66 IN | OXYGEN SATURATION: 99 % | WEIGHT: 170 LBS | TEMPERATURE: 98.2 F | DIASTOLIC BLOOD PRESSURE: 67 MMHG

## 2023-04-02 DIAGNOSIS — J98.01 ACUTE BRONCHOSPASM: ICD-10-CM

## 2023-04-02 DIAGNOSIS — J20.9 ACUTE BRONCHITIS, UNSPECIFIED ORGANISM: Primary | ICD-10-CM

## 2023-04-02 PROCEDURE — U0003 INFECTIOUS AGENT DETECTION BY NUCLEIC ACID (DNA OR RNA); SEVERE ACUTE RESPIRATORY SYNDROME CORONAVIRUS 2 (SARS-COV-2) (CORONAVIRUS DISEASE [COVID-19]), AMPLIFIED PROBE TECHNIQUE, MAKING USE OF HIGH THROUGHPUT TECHNOLOGIES AS DESCRIBED BY CMS-2020-01-R: HCPCS | Performed by: PHYSICIAN ASSISTANT

## 2023-04-02 PROCEDURE — 99214 OFFICE O/P EST MOD 30 MIN: CPT | Mod: CS | Performed by: PHYSICIAN ASSISTANT

## 2023-04-02 PROCEDURE — U0005 INFEC AGEN DETEC AMPLI PROBE: HCPCS | Performed by: PHYSICIAN ASSISTANT

## 2023-04-02 RX ORDER — ALBUTEROL SULFATE 90 UG/1
1-2 AEROSOL, METERED RESPIRATORY (INHALATION) EVERY 6 HOURS
Qty: 8.5 G | Refills: 0 | Status: SHIPPED | OUTPATIENT
Start: 2023-04-02 | End: 2023-09-08

## 2023-04-02 NOTE — PATIENT INSTRUCTIONS
Patient was educated on the natural course of viral condition. COVID PCR is pending. Take medications as directed. Side effects discussed. Conservative measures discussed including rest, increased fluids, humidifier, and over-the-counter cough suppressants. See your primary care provider if symptoms do not improve in 8 days. Seek emergency care if you develop shortness of breath or fever over 103.

## 2023-04-02 NOTE — PROGRESS NOTES
URGENT CARE VISIT:    SUBJECTIVE:   Kary Perry is a 52 year old female presenting with a chief complaint of stuffy nose and cough - productive.  Onset was 2 day(s) ago.   She denies the following symptoms: fever, chills, shortness of breath, vomiting and diarrhea  Course of illness is same.    Treatment measures tried include None tried with no relief of symptoms.  Predisposing factors include None.    PMH:   Past Medical History:   Diagnosis Date     Diffuse cystic mastopathy     1991 had mammogram - left breast more dense     Family history of blood disorder     related to a transfusion in her paternal grandmother     FH: non-Hodgkin's lymphoma     Mother 2009     Herpes simplex without mention of complication     herpes labialis     Infectious mononucleosis 1987     Other specified temporomandibular joint disorders      Undiagnosed cardiac murmurs     innocent murmur     Allergies: Eggs [chicken-derived products (egg)] and Sulfa drugs   Medications:   Current Outpatient Medications   Medication Sig Dispense Refill     acyclovir (ZOVIRAX) 800 MG tablet Take 1 tablet (800 mg) by mouth every 8 hours With each cold sore outbreak. 9 tablet 3     albuterol (PROAIR HFA/PROVENTIL HFA/VENTOLIN HFA) 108 (90 Base) MCG/ACT inhaler Inhale 1-2 puffs into the lungs every 6 hours for 5 days 8.5 g 0     Calcium 600 MG tablet Take 1 tablet by mouth daily.       Cholecalciferol (VITAMIN D-3) 5000 units TABS Take 1 tablet by mouth daily 90 tablet 3     estradiol (ESTRACE) 0.1 MG/GM vaginal cream Place 2 g vaginally three times a week 72 g 1     Ibuprofen 200 MG capsule Take 200-600 mg by mouth every 4 hours as needed for fever. 100 capsule 3     MULTIVITAMIN TABS   OR 2 tabs daily       Omega-3 Fatty Acids (OMEGA-3 FISH OIL PO) Take 1,200 mg by mouth daily        scopolamine (TRANSDERM) 1 MG/3DAYS 72 hr patch Apply 1 patch to hairless area behind one ear at least 4 hours before travel.  Remove old patch and change every 3  "days (72 hours). 5 patch 3     VITAMIN B COMPLEX PO TABS 2,000 mg daily       vitamin C (ASCORBIC ACID) 1000 MG TABS Take 1,000 mg by mouth 2 times daily       Social History:   Social History     Tobacco Use     Smoking status: Never     Smokeless tobacco: Never   Vaping Use     Vaping status: Not on file   Substance Use Topics     Alcohol use: Yes     Comment: 1-2 per week       ROS:  General: negative  Skin: negative  Eyes: negative  Ears/Nose/Throat: negative  Respiratory: Cough  Cardiovascular: negative  Gastrointestinal: negative  Genitourinary: negative  Musculoskeletal: negative  Neurologic: negative      OBJECTIVE:  /67   Temp 98.2  F (36.8  C) (Temporal)   Ht 1.676 m (5' 6\")   Wt 77.1 kg (170 lb)   SpO2 99%   BMI 27.44 kg/m    GENERAL APPEARANCE: healthy, alert and no distress  EYES: EOMI,  PERRL, conjunctiva clear  HENT: ear canals and TM's normal.  Nose and mouth without ulcers, erythema or lesions  NECK: supple, nontender, no lymphadenopathy  RESP: expiratory wheezes throughout  CV: regular rates and rhythm, normal S1 S2, no murmur noted  SKIN: no suspicious lesions or rashes      ASSESSMENT:    ICD-10-CM    1. Acute bronchitis, unspecified organism  J20.9 albuterol (PROAIR HFA/PROVENTIL HFA/VENTOLIN HFA) 108 (90 Base) MCG/ACT inhaler     Symptomatic COVID-19 Virus (Coronavirus) by PCR     Symptomatic COVID-19 Virus (Coronavirus) by PCR Nose      2. Acute bronchospasm  J98.01           PLAN:  Patient Instructions   Patient was educated on the natural course of viral condition. COVID PCR is pending. Take medications as directed. Side effects discussed. Conservative measures discussed including rest, increased fluids, humidifier, and over-the-counter cough suppressants. See your primary care provider if symptoms do not improve in 8 days. Seek emergency care if you develop shortness of breath or fever over 103.    Patient verbalized understanding and is agreeable to plan. The patient was " discharged ambulatory and in stable condition.    HAMILTON Contreras...................  4/2/2023   2:42 PM

## 2023-04-04 LAB — SARS-COV-2 RNA RESP QL NAA+PROBE: NEGATIVE

## 2023-04-18 ENCOUNTER — OFFICE VISIT (OUTPATIENT)
Dept: DERMATOLOGY | Facility: CLINIC | Age: 53
End: 2023-04-18
Payer: COMMERCIAL

## 2023-04-18 DIAGNOSIS — D22.9 NEVUS: Primary | ICD-10-CM

## 2023-04-18 DIAGNOSIS — D18.01 ANGIOMA OF SKIN: ICD-10-CM

## 2023-04-18 DIAGNOSIS — L82.1 SEBORRHEIC KERATOSIS: ICD-10-CM

## 2023-04-18 DIAGNOSIS — L81.4 LENTIGO: ICD-10-CM

## 2023-04-18 DIAGNOSIS — B07.0 VERRUCA PLANTARIS: ICD-10-CM

## 2023-04-18 PROCEDURE — 99213 OFFICE O/P EST LOW 20 MIN: CPT | Performed by: PHYSICIAN ASSISTANT

## 2023-04-18 NOTE — LETTER
4/18/2023         RE: Kary Perry  [address] [id number]        Dear Colleague,    Thank you for referring your patient, Kary Perry, to the [facility]. Please see a copy of my visit note below.    HPI:   Chief complaints: Kary Perry is a 52 year old female who presents for Full skin cancer screening to rule out skin cancer   Last Skin Exam: 1.5 years ago      1st Baseline: no  Personal HX of Skin Cancer: no   Personal HX of Malignant Melanoma: no   Family HX of Skin Cancer / Malignant Melanoma: no  Personal HX of Atypical Moles:   no  Risk factors: history of sun exposure and burns; some tanning bed use in the past  New / Changing lesions: no but wart on the foot is still present  Social History: Wife Estrellita. They like to sail  On review of systems, there are no further skin complaints, patient is feeling otherwise well.  See patient intake sheet.  ROS of the following were done and are negative: Constitutional, Eyes, Ears, Nose,   Mouth, Throat, Cardiovascular, Respiratory, GI, Genitourinary, Musculoskeletal,   Psychiatric, Endocrine, Allergic/Immunologic.    PHYSICAL EXAM:   LMP  (LMP Unknown)   Skin exam performed as follows: Type 2 skin. Mood appropriate  Alert and Oriented X 3. Well developed, well nourished in no distress.  General appearance: Normal  Head including face: Normal  Eyes: conjunctiva and lids: Normal  Mouth: Lips, teeth, gums: Normal  Neck: Normal  Chest-breast/axillae: Normal  Back: Normal  Spleen and liver: Normal  Cardiovascular: Exam of peripheral vascular system by observation for swelling, varicosities, edema: Normal  Genitalia: groin, buttocks: Normal  Extremities: digits/nails (clubbing): Normal  Eccrine and Apocrine glands: Normal  Right upper extremity: Normal  Left upper extremity: Normal  Right lower extremity: Normal  Left lower extremity: Normal  Skin: Scalp and body hair: See below    Pt  deferred exam of breasts, groin, buttocks: No    Other physical findings:  1. Multiple pigmented macules on extremities and trunk  2. Multiple pigmented macules on face, trunk and extremities  3. Multiple vascular papules on trunk, arms and legs  4. Multiple scattered keratotic plaques  5. Verrucous papule on the right plantar surface x 1       Except as noted above, no other signs of skin cancer or melanoma.     ASSESSMENT/PLAN:   Benign Full skin cancer screening today. . Patient with history of none  Advised on monthly self exams and 1 year  Patient Education: Appropriate brochures given.    1. Multiple benign appearing nevi on arms, legs and trunk. Discussed ABCDEs of melanoma and sunscreen.   2. Multiple lentigos on arms, legs and trunk. Advised benign, no treatment needed.  3. Multiple scattered angiomas. Advised benign, no treatment needed.   4. Seborrheic keratosis on arms, legs and trunk. Advised benign, no treatment needed.  5. Verruca plantaris   --Start NuCara wart peel at bedtime as tolerated              Follow-up: yearly FSE/PRN sooner    1.) Patient was asked about new and changing moles. YES  2.) Patient received a complete physical skin examination: YES  3.) Patient was counseled to perform a monthly self skin examination: YES  Scribed By: Marley Calle, MS, PASimiC          Again, thank you for allowing me to participate in the care of your patient.        Sincerely,        Marley Calle PA-C

## 2023-04-18 NOTE — PROGRESS NOTES
HPI:   Chief complaints: Kary Perry is a 52 year old female who presents for Full skin cancer screening to rule out skin cancer   Last Skin Exam: 1.5 years ago      1st Baseline: no  Personal HX of Skin Cancer: no   Personal HX of Malignant Melanoma: no   Family HX of Skin Cancer / Malignant Melanoma: no  Personal HX of Atypical Moles:   no  Risk factors: history of sun exposure and burns; some tanning bed use in the past  New / Changing lesions: no but wart on the foot is still present  Social History: Wife Estrellita. They like to sail  On review of systems, there are no further skin complaints, patient is feeling otherwise well.  See patient intake sheet.  ROS of the following were done and are negative: Constitutional, Eyes, Ears, Nose,   Mouth, Throat, Cardiovascular, Respiratory, GI, Genitourinary, Musculoskeletal,   Psychiatric, Endocrine, Allergic/Immunologic.    PHYSICAL EXAM:   LMP  (LMP Unknown)   Skin exam performed as follows: Type 2 skin. Mood appropriate  Alert and Oriented X 3. Well developed, well nourished in no distress.  General appearance: Normal  Head including face: Normal  Eyes: conjunctiva and lids: Normal  Mouth: Lips, teeth, gums: Normal  Neck: Normal  Chest-breast/axillae: Normal  Back: Normal  Spleen and liver: Normal  Cardiovascular: Exam of peripheral vascular system by observation for swelling, varicosities, edema: Normal  Genitalia: groin, buttocks: Normal  Extremities: digits/nails (clubbing): Normal  Eccrine and Apocrine glands: Normal  Right upper extremity: Normal  Left upper extremity: Normal  Right lower extremity: Normal  Left lower extremity: Normal  Skin: Scalp and body hair: See below    Pt deferred exam of breasts, groin, buttocks: No    Other physical findings:  1. Multiple pigmented macules on extremities and trunk  2. Multiple pigmented macules on face, trunk and extremities  3. Multiple vascular papules on trunk, arms and legs  4. Multiple scattered keratotic  plaques  5. Verrucous papule on the right plantar surface x 1       Except as noted above, no other signs of skin cancer or melanoma.     ASSESSMENT/PLAN:   Benign Full skin cancer screening today. . Patient with history of none  Advised on monthly self exams and 1 year  Patient Education: Appropriate brochures given.    1. Multiple benign appearing nevi on arms, legs and trunk. Discussed ABCDEs of melanoma and sunscreen.   2. Multiple lentigos on arms, legs and trunk. Advised benign, no treatment needed.  3. Multiple scattered angiomas. Advised benign, no treatment needed.   4. Seborrheic keratosis on arms, legs and trunk. Advised benign, no treatment needed.  5. Verruca plantaris   --Start NuCara wart peel at bedtime as tolerated              Follow-up: yearly FSE/PRN sooner    1.) Patient was asked about new and changing moles. YES  2.) Patient received a complete physical skin examination: YES  3.) Patient was counseled to perform a monthly self skin examination: YES  Scribed By: Marley Calle MS, PAYESI

## 2023-05-03 ENCOUNTER — NURSE TRIAGE (OUTPATIENT)
Dept: FAMILY MEDICINE | Facility: CLINIC | Age: 53
End: 2023-05-03
Payer: COMMERCIAL

## 2023-05-03 NOTE — TELEPHONE ENCOUNTER
Call received from patient:  1. Last night woke up with indigestion  2. Took an anai seltzer, increased pain  3. Mother is in healthcare; there was pain between shoulder blades  4. There was pain in right abdomen, vomited a couple times  5. Applied heat and drank warm water  6. Pain lasted 2 hours, then resolved; no recurrence  7. Symptoms currently feel residual  8. Currently no pain  9. Never had this type of pain before  10. Taking a deep breath during pain episode was difficult  11. Afebrile  12. Had shakes with chills  13. No chest pain  14. Appt with PCP not until 6/8/23-should she be seen sooner?  15. Thinks she had a gall bladder attack    Writer recommended evaluation today or tomorrow. Appt scheduled on 5/4/23 with JAREN Doss PA-C.  Visit date, time and location confirmed with patient.    If pain recurs, be evaluated in the Emergency Room.    Patient verbalized understanding and in agreement with plan.    MOUNA Dean, RN-BC  [facility]          Reason for Disposition    Patient wants to be seen    Additional Information    Negative: Passed out (i.e., fainted, collapsed and was not responding)    Negative: Shock suspected (e.g., cold/pale/clammy skin, too weak to stand, low BP, rapid pulse)    Negative: Sounds like a life-threatening emergency to the triager    Negative: Chest pain    Negative: Pain is mainly in upper abdomen (if needed ask: 'is it mainly above the belly button?')    Negative: Abdominal pain and pregnant < 20 weeks    Negative: Abdominal pain and pregnant 20 or more weeks    Negative: SEVERE abdominal pain (e.g., excruciating)    Negative: Vomiting red blood or black (coffee ground) material    Negative: Bloody, black, or tarry bowel movements  (Exception: Chronic-unchanged black-grey bowel movements and is taking iron pills or Pepto-Bismol.)    Negative: Constant abdominal pain lasting > 2 hours    Negative: Vomiting bile (green color)    Negative: Patient  sounds very sick or weak to the triager    Negative: Vomiting and abdomen looks much more swollen than usual    Negative: White of the eyes have turned yellow (i.e., jaundice)    Negative: Blood in urine (red, pink, or tea-colored)    Negative: Fever > 103 F (39.4 C)    Negative: Fever > 101 F (38.3 C) and over 60 years of age    Negative: Fever > 100.0 F (37.8 C) and has diabetes mellitus or a weak immune system (e.g., HIV positive, cancer chemotherapy, organ transplant, splenectomy, chronic steroids)    Negative: Fever > 100.0 F (37.8 C) and bedridden (e.g., nursing home patient, stroke, chronic illness, recovering from surgery)    Negative: Pregnant or could be pregnant (i.e., missed last menstrual period)    Negative: MODERATE pain (e.g., interferes with normal activities that comes and goes (cramps) lasts > 24 hours  (Exception: Pain with Vomiting or Diarrhea - see that Protocol.)    Negative: Unusual vaginal discharge    Negative: Age > 60 years    Protocols used: ABDOMINAL PAIN - FEMALE-A-OH

## 2023-05-04 ENCOUNTER — OFFICE VISIT (OUTPATIENT)
Dept: FAMILY MEDICINE | Facility: CLINIC | Age: 53
End: 2023-05-04
Payer: COMMERCIAL

## 2023-05-04 ENCOUNTER — ANCILLARY PROCEDURE (OUTPATIENT)
Dept: ULTRASOUND IMAGING | Facility: CLINIC | Age: 53
End: 2023-05-04
Attending: PHYSICIAN ASSISTANT
Payer: COMMERCIAL

## 2023-05-04 VITALS
SYSTOLIC BLOOD PRESSURE: 110 MMHG | TEMPERATURE: 98.1 F | WEIGHT: 169.05 LBS | OXYGEN SATURATION: 99 % | RESPIRATION RATE: 16 BRPM | DIASTOLIC BLOOD PRESSURE: 76 MMHG | HEART RATE: 70 BPM | HEIGHT: 66 IN | BODY MASS INDEX: 27.17 KG/M2

## 2023-05-04 DIAGNOSIS — R10.11 RUQ ABDOMINAL PAIN: ICD-10-CM

## 2023-05-04 DIAGNOSIS — K80.50 BILIARY COLIC: ICD-10-CM

## 2023-05-04 DIAGNOSIS — R10.11 RUQ ABDOMINAL PAIN: Primary | ICD-10-CM

## 2023-05-04 PROCEDURE — 76700 US EXAM ABDOM COMPLETE: CPT | Mod: TC | Performed by: RADIOLOGY

## 2023-05-04 PROCEDURE — 99213 OFFICE O/P EST LOW 20 MIN: CPT | Performed by: PHYSICIAN ASSISTANT

## 2023-05-04 ASSESSMENT — PAIN SCALES - GENERAL: PAINLEVEL: NO PAIN (1)

## 2023-05-04 NOTE — PROGRESS NOTES
"  Assessment & Plan     RUQ abdominal pain - suspicious for gallbladder etiology, suspect symptomatic cholelithiasis. Discussed low-fat diet and recommend US to evaluate.  - US Abdomen Complete    HAMILTON Laguna [facility]    Ovi Garcia is a 52 year old, presenting for the following health issues:  Abdominal Pain        5/4/2023     9:47 AM   Additional Questions   Roomed by Beba   Accompanied by Estrellita     Tuesday 12:30am woke with heartburn, took anai seltzer  Didn't help - had worse RUQ pain, pain between shoulder blades  Felt nauseous, episode of emesis no blood, discoloration  Then got chills/shakes - no fever  Eventually severity subsided, had little waves of worsening  Still notices discomfort in the area, but not nearly as severe  Never had an event like this in the past    History of Present Illness       Reason for visit:  Gall bladder issues  Symptom onset:  1-3 days ago  Symptoms include:  Nausea, pain, vomiting, stomach pain, pain betwen my shoulder blades  Symptom intensity:  Moderate  Symptom progression:  Improving  Had these symptoms before:  No  What makes it better:  Heat applied to areas, moving and hot tea    She eats 4 or more servings of fruits and vegetables daily.She consumes 1 sweetened beverage(s) daily.She exercises with enough effort to increase her heart rate 20 to 29 minutes per day.  She exercises with enough effort to increase her heart rate 4 days per week.   She is taking medications regularly.         Objective    /76 (BP Location: Right arm, Patient Position: Sitting, Cuff Size: Adult Large)   Pulse 70   Temp 98.1  F (36.7  C) (Temporal)   Resp 16   Ht 1.676 m (5' 6\")   Wt 76.7 kg (169 lb 0.8 oz)   LMP  (LMP Unknown)   SpO2 99%   BMI 27.29 kg/m    Body mass index is 27.29 kg/m .  Physical Exam   GENERAL: healthy, alert and no distress  ABDOMEN: soft, nontender, no hepatosplenomegaly, no masses and bowel sounds normal  PSYCH: " mentation appears normal, affect normal/bright

## 2023-05-05 ENCOUNTER — TELEPHONE (OUTPATIENT)
Dept: FAMILY MEDICINE | Facility: CLINIC | Age: 53
End: 2023-05-05
Payer: COMMERCIAL

## 2023-05-05 NOTE — TELEPHONE ENCOUNTER
"Yes, I apologize Radha - when finishing your chart yesterday afternoon I reviewed the preliminary result but they haven't released the \"final\" result - so I haven't been able to communicate results with you    Thus far, based on preliminary results - it appears there aren't any discrete stones, but instead \"sludge\" - which is a mix of thickened bile with little stones. This is good news, as I suspect you are unlikely to have a severe attack while you are out of the country, but should still have a meeting to discuss with a surgeon. Do still follow the low fat diet recommendations to minimize any symptoms.  I put in the referral for general surgery and they should call you to schedule.  Leidy Doss PA-C    "

## 2023-05-05 NOTE — TELEPHONE ENCOUNTER
Patient calling in, had ultrasound but hadn't heard results yet and then got a call to schedule with general surgery so she wanted to make sure nothing was urgently wrong    Informed her that Leidy placed both referrals for Ultrasound and general surgery at the same time to get ball rolling.     She expressed understanding and plans to schedule with gen surg for a consult and wait to hear about ultrasound results from Leidy Valerio, LEIGH ANNN RN  [facility]

## 2023-05-05 NOTE — TELEPHONE ENCOUNTER
I called and informed patient of message below    She provided understanding    Will schedule with gen surg      LEIGH ANN MelendezN RN  [facility]

## 2023-05-09 ENCOUNTER — OFFICE VISIT (OUTPATIENT)
Dept: SURGERY | Facility: CLINIC | Age: 53
End: 2023-05-09
Payer: COMMERCIAL

## 2023-05-09 VITALS
HEART RATE: 78 BPM | DIASTOLIC BLOOD PRESSURE: 60 MMHG | WEIGHT: 169 LBS | BODY MASS INDEX: 27.16 KG/M2 | HEIGHT: 66 IN | SYSTOLIC BLOOD PRESSURE: 100 MMHG

## 2023-05-09 DIAGNOSIS — K83.8 BILIARY SLUDGE DETERMINED BY ULTRASOUND: ICD-10-CM

## 2023-05-09 DIAGNOSIS — K80.20 SYMPTOMATIC CHOLELITHIASIS: Primary | ICD-10-CM

## 2023-05-09 PROCEDURE — 99203 OFFICE O/P NEW LOW 30 MIN: CPT | Performed by: STUDENT IN AN ORGANIZED HEALTH CARE EDUCATION/TRAINING PROGRAM

## 2023-05-09 NOTE — PROGRESS NOTES
[facility] Consultation    CHIEF COMPLAINT:  Chief Complaint   Patient presents with     Consult     Gallbladder        HISTORY OF PRESENT ILLNESS:  Kary Perry is a 52 year old female who is seen in consultation with her significant other at the request of Leidy Doss PA-C for evaluation of gallbladder and biliary sludge.  Patient reports that about just a week ago she woke up at 12:30 AM with sudden onset of heartburn and epigastric abdominal pain with radiation to her back and between her shoulder blades.  She had associated nausea and vomiting.  She treated it with heat packs and warm water.  This pain lasted for 1-1/2 to 2 hours.  She is never had pain like this before.  This was 4 hours after her dinner.  Due to this she presented to her primary care provider where she underwent ultrasound that showed biliary sludge.  Since this episode she denies any further symptoms or episodes.  She does feel some soreness in her right upper quadrant but this is very tolerable.  She reports she is eating well and staying hydrated.  She is having good bowel movements.  She denies any prior abdominal surgeries.  She denies any personal or family history of bleeding issues or issues with general anesthesia.  She works as an .    REVIEW OF SYSTEMS:  Constitutional: No fevers or chills  Eyes: No blurred or double vision  HENT: Denies headaches, No rhinorrhea, No sore throat  Respiratory: No cough or shortness of breath  Cardiovascular: Denies chest pain or palpitations  Gastrointestinal: No abdominal pain or nausea/vomiting  Genitourinary: No hematuria or dysuria  Musculoskeletal: Denies arthralgias or myalgias  Neurologic: No numbness or tingling  Integumentary: No skin rashes    Past Medical History:   Diagnosis Date     Diffuse cystic mastopathy     1991 had mammogram - left breast more dense     Family history of blood disorder     related to a transfusion in her paternal grandmother      FH: non-Hodgkin's lymphoma     Mother 2009     Herpes simplex without mention of complication     herpes labialis     Infectious mononucleosis 1987     Other specified temporomandibular joint disorders      Undiagnosed cardiac murmurs     innocent murmur       Past Surgical History:   Procedure Laterality Date     BREAST SURGERY  3/2020    Breast reduction     COLONOSCOPY  1/2018     ENT SURGERY  3/1994    Tonsillectomy     GYN SURGERY  2009    Ablation     HYSTEROSCOPY  10/2004    D&C/hysteroscopy/polypectomy     HYSTEROSCOPY  05/2008    hysteroscopy/polypectomy     HYSTEROSCOPY  11/02/2009    hysteroscopy/D&C/endometrial ablation     ZZC NONSPECIFIC PROCEDURE  1993    Tonsillectomy       Family History   Problem Relation Age of Onset     Hypertension Mother      Lipids Mother      Obesity Mother      Blood Disease Mother         low epo level     Cancer Mother 67        Lymphoma chemo and radiation     Hyperlipidemia Mother      Other Cancer Mother         Lymphoma     Other - See Comments Mother         anemia     Skin Cancer Mother      Cancer Maternal Grandmother         Melanoma/Leukemia     Osteoporosis Maternal Grandmother      C.A.D. Maternal Grandmother      Arthritis Maternal Grandmother      Obesity Maternal Grandmother      Skin Cancer Maternal Grandmother      Other Cancer Maternal Grandmother         Leukemia & AML     Heart Disease Paternal Grandmother      Obesity Paternal Grandmother      Blood Disease Paternal Grandmother         Used Blood Thinners     Cerebrovascular Disease Paternal Grandmother      Hypertension Paternal Grandmother      C.A.D. Paternal Grandmother      Arthritis Paternal Grandmother      Musculoskeletal Disorder Paternal Grandmother         Anti Phospho Lipid Syndrome     Cerebrovascular Disease Paternal Grandfather      Diabetes Paternal Grandfather      Heart Disease Paternal Grandfather         Heart Attack     Colon Cancer Paternal Grandfather      Alcohol/Drug Father          methamphetamine     Heart Disease Father         passed away from CHF     Substance Abuse Father      Breast Cancer No family hx of        Social History     Tobacco Use     Smoking status: Never     Smokeless tobacco: Never   Vaping Use     Vaping status: Not on file   Substance Use Topics     Alcohol use: Yes     Comment: 1-2 per week       Patient Active Problem List   Diagnosis     Undiagnosed cardiac murmurs     Diffuse cystic mastopathy     Herpes simplex virus (HSV) infection     DUB (dysfunctional uterine bleeding)     CARDIOVASCULAR SCREENING; LDL GOAL LESS THAN 160     Dysphagia     Vitamin D deficiency     Neck sprain     History of ovarian cyst       Allergies   Allergen Reactions     Eggs [Chicken-Derived Products (Egg)] Nausea     Sulfa Antibiotics Hives       Current Outpatient Medications   Medication Sig Dispense Refill     acyclovir (ZOVIRAX) 800 MG tablet Take 1 tablet (800 mg) by mouth every 8 hours With each cold sore outbreak. 9 tablet 3     Calcium 600 MG tablet Take 1 tablet by mouth daily.       Cholecalciferol (VITAMIN D-3) 5000 units TABS Take 1 tablet by mouth daily 90 tablet 3     estradiol (ESTRACE) 0.1 MG/GM vaginal cream Place 2 g vaginally three times a week 72 g 1     Ibuprofen 200 MG capsule Take 200-600 mg by mouth every 4 hours as needed for fever. 100 capsule 3     MULTIVITAMIN TABS   OR 2 tabs daily       Omega-3 Fatty Acids (OMEGA-3 FISH OIL PO) Take 1,200 mg by mouth daily        scopolamine (TRANSDERM) 1 MG/3DAYS 72 hr patch Apply 1 patch to hairless area behind one ear at least 4 hours before travel.  Remove old patch and change every 3 days (72 hours). 5 patch 3     VITAMIN B COMPLEX PO TABS 2,000 mg daily       vitamin C (ASCORBIC ACID) 1000 MG TABS Take 1,000 mg by mouth 2 times daily       albuterol (PROAIR HFA/PROVENTIL HFA/VENTOLIN HFA) 108 (90 Base) MCG/ACT inhaler Inhale 1-2 puffs into the lungs every 6 hours for 5 days 8.5 g 0       Vitals: /60    "Pulse 78   Ht 1.676 m (5' 6\")   Wt 76.7 kg (169 lb)   LMP  (LMP Unknown)   BMI 27.28 kg/m    BMI= Body mass index is 27.28 kg/m .    EXAM:  General: Vital signs reviewed, in no apparent distress  Eyes: Anicteric  HENT: Normocephalic, atraumatic, trachea midline   Respiratory: Breathing nonlabored  Cardiovascular: Regular rate and rhythm  GI: Abdomen soft, nondistended, nontender, no organomegaly.  No rebound.  No peritoneal signs.  Musculoskeletal: No gross deformities  Neurologic: Grossly nonfocal exam  Psychiatric: Normal mood, affect and insight  Integumentary: Warm and dry    All labs and imaging personally reviewed and significant for:     Labs: No recent labs completed    US Abd -ultrasound significant for biliary sludge.  No evidence of cholecystitis.    ASSESSMENT:  Kary Perry is a 52 year old who presents with symptomatic cholelithiasis secondary to biliary sludge.  Pathophysiology, labs and imaging were reviewed with the patient.  Risk and benefits of watchful waiting versus surgical management were discussed with the patient.  Risk of watchful waiting include continued recurrent episodes and possible worsening episodes.  If she was to proceed with surgery, we discussed will be managed with minimally invasive robotic-assisted surgery.  Risk and benefits of surgery discussed include but is not limited to: pain, bleeding, infection, possible injury to surrounding structures and organs, conversion to open, and possible future procedures.  Perioperative course and management were also discussed with the patient.  Patient voiced understanding and would like to wait on planning surgery.  She would like to assess her schedule and see what is the more optimal timing for her.  This is reasonable given the fact that she is completely asymptomatic currently and is tolerating oral diet.  We did discuss warning signs requiring a more urgent surgical management that include nausea, vomiting, fevers, and " unrelenting pain.     PLAN:  Recommend robotic cholecystectomy.  Patient will call and schedule at her convenience.    It was my pleasure to participate in the care of Kary Perry in clinic today. Thank you for this consultation.         Lior Bowman MD    Please route or send letter to:  Primary Care Provider (PCP) and Referring Provider

## 2023-05-10 ENCOUNTER — MYC MEDICAL ADVICE (OUTPATIENT)
Dept: FAMILY MEDICINE | Facility: CLINIC | Age: 53
End: 2023-05-10
Payer: COMMERCIAL

## 2023-05-10 ENCOUNTER — HOSPITAL ENCOUNTER (OUTPATIENT)
Facility: CLINIC | Age: 53
End: 2023-05-10
Attending: STUDENT IN AN ORGANIZED HEALTH CARE EDUCATION/TRAINING PROGRAM | Admitting: STUDENT IN AN ORGANIZED HEALTH CARE EDUCATION/TRAINING PROGRAM
Payer: COMMERCIAL

## 2023-05-10 ENCOUNTER — TELEPHONE (OUTPATIENT)
Dept: SURGERY | Facility: CLINIC | Age: 53
End: 2023-05-10
Payer: COMMERCIAL

## 2023-05-10 RX ORDER — CEFAZOLIN SODIUM/WATER 2 G/20 ML
2 SYRINGE (ML) INTRAVENOUS
Status: CANCELLED | OUTPATIENT
Start: 2023-05-10

## 2023-05-10 RX ORDER — CEFAZOLIN SODIUM/WATER 2 G/20 ML
2 SYRINGE (ML) INTRAVENOUS SEE ADMIN INSTRUCTIONS
Status: CANCELLED | OUTPATIENT
Start: 2023-05-10

## 2023-05-10 NOTE — TELEPHONE ENCOUNTER
Type of surgery: robot assisted laparoscopic cholecystectomy  Location of surgery: [facility]  Date and time of surgery: 6/19/23 10:30am  Surgeon: Dr Bowman  Pre-Op Appt Date: pt to schedule  Post-Op Appt Date: pt to schedule   Packet sent out: Yes  Pre-cert/Authorization completed:  Not Applicable  Date: 5/10/23

## 2023-05-12 NOTE — TELEPHONE ENCOUNTER
Writer responded via [employer].  Appt changed.    Gris MCKEON RN  [facility]

## 2023-08-17 NOTE — PROGRESS NOTES
[facility] Consultation    CHIEF COMPLAINT:  Abdominal pain    HISTORY OF PRESENT ILLNESS:  Kary Perry is a 53 year old female who is seen in consultation at the request of Dr. Doss for evaluation of biliary colic.    Kathrin had been seen on 5/9/2023 by Dr. Bowman due to biliary sludge and abdominal pain.  At that time she had had episodes of biliary colic and then proceed with work-up with abdominal ultrasound which revealed gallbladder sludge and no biliary ductal dilation.  She had liver function test evaluated in December 2022 which were benign.  She had not had any prior abdominal surgeries.    She reports she continues to have some intermittent symptoms related to her gallbladder.  She will notice if she eats fattier foods she has pain and discomfort in the right upper quadrant.  Many times she just has soreness there.  Occasionally this will radiate to her back.  She has not had further episodes associated with nausea since May.  She is here with her wife today and tell me they travel significantly and are worried that she may have an episode or cholecystitis while traveling.  She is interested in surgery likely in October.    REVIEW OF SYSTEMS:  10 point review of systems completed and otherwise negative aside from as listed in HPI.     Past Medical History:   Diagnosis Date    Diffuse cystic mastopathy     1991 had mammogram - left breast more dense    Family history of blood disorder     related to a transfusion in her paternal grandmother    FH: non-Hodgkin's lymphoma     Mother 2009    Herpes simplex without mention of complication     herpes labialis    Infectious mononucleosis 1987    Other specified temporomandibular joint disorders     Undiagnosed cardiac murmurs     innocent murmur       Past Surgical History:   Procedure Laterality Date    BREAST SURGERY  3/2020    Breast reduction    COLONOSCOPY  1/2018    ENT SURGERY  3/1994    Tonsillectomy    GYN SURGERY  2009     Ablation    HYSTEROSCOPY  10/2004    D&C/hysteroscopy/polypectomy    HYSTEROSCOPY  05/2008    hysteroscopy/polypectomy    HYSTEROSCOPY  11/02/2009    hysteroscopy/D&C/endometrial ablation    ZZC NONSPECIFIC PROCEDURE  1993    Tonsillectomy       Family History   Problem Relation Age of Onset    Hypertension Mother     Lipids Mother     Obesity Mother     Blood Disease Mother         low epo level    Cancer Mother 67        Lymphoma chemo and radiation    Hyperlipidemia Mother     Other Cancer Mother         Lymphoma    Other - See Comments Mother         anemia    Skin Cancer Mother     Cancer Maternal Grandmother         Melanoma/Leukemia    Osteoporosis Maternal Grandmother     C.A.D. Maternal Grandmother     Arthritis Maternal Grandmother     Obesity Maternal Grandmother     Skin Cancer Maternal Grandmother     Other Cancer Maternal Grandmother         Leukemia & AML    Heart Disease Paternal Grandmother     Obesity Paternal Grandmother     Blood Disease Paternal Grandmother         Used Blood Thinners    Cerebrovascular Disease Paternal Grandmother     Hypertension Paternal Grandmother     C.A.D. Paternal Grandmother     Arthritis Paternal Grandmother     Musculoskeletal Disorder Paternal Grandmother         Anti Phospho Lipid Syndrome    Cerebrovascular Disease Paternal Grandfather     Diabetes Paternal Grandfather     Heart Disease Paternal Grandfather         Heart Attack    Colon Cancer Paternal Grandfather     Alcohol/Drug Father         methamphetamine    Heart Disease Father         passed away from CHF    Substance Abuse Father     Breast Cancer No family hx of        Social History     Tobacco Use    Smoking status: Never    Smokeless tobacco: Never   Substance Use Topics    Alcohol use: Yes     Comment: 1-2 per week       Patient Active Problem List   Diagnosis    Undiagnosed cardiac murmurs    Diffuse cystic mastopathy    Herpes simplex virus (HSV) infection    DUB (dysfunctional uterine bleeding)     "CARDIOVASCULAR SCREENING; LDL GOAL LESS THAN 160    Dysphagia    Vitamin D deficiency    Neck sprain    History of ovarian cyst       Allergies   Allergen Reactions    Eggs [Chicken-Derived Products (Egg)] Nausea    Sulfa Antibiotics Hives       Current Outpatient Medications   Medication Sig Dispense Refill    acyclovir (ZOVIRAX) 800 MG tablet Take 1 tablet (800 mg) by mouth every 8 hours With each cold sore outbreak. 9 tablet 3    albuterol (PROAIR HFA/PROVENTIL HFA/VENTOLIN HFA) 108 (90 Base) MCG/ACT inhaler Inhale 1-2 puffs into the lungs every 6 hours for 5 days 8.5 g 0    Calcium 600 MG tablet Take 1 tablet by mouth daily.      Cholecalciferol (VITAMIN D-3) 5000 units TABS Take 1 tablet by mouth daily 90 tablet 3    estradiol (ESTRACE) 0.1 MG/GM vaginal cream Place 2 g vaginally three times a week 72 g 1    Ibuprofen 200 MG capsule Take 200-600 mg by mouth every 4 hours as needed for fever. 100 capsule 3    MULTIVITAMIN TABS   OR 2 tabs daily      Omega-3 Fatty Acids (OMEGA-3 FISH OIL PO) Take 1,200 mg by mouth daily       scopolamine (TRANSDERM) 1 MG/3DAYS 72 hr patch Apply 1 patch to hairless area behind one ear at least 4 hours before travel.  Remove old patch and change every 3 days (72 hours). 5 patch 3    VITAMIN B COMPLEX PO TABS 2,000 mg daily      vitamin C (ASCORBIC ACID) 1000 MG TABS Take 1,000 mg by mouth 2 times daily         Vitals: /80   Pulse 80   Ht 1.676 m (5' 6\")   Wt 73.5 kg (162 lb)   BMI 26.15 kg/m    BMI= Body mass index is 26.15 kg/m .    EXAM:  GENERAL: healthy, alert and no distress   PSYCH: pleasant, normal affect  HEENT: moist mucus membranes, no scleral icterus  CARDIOVASCULAR:  RRR  RESPIRATORY: non labored breathing  NECK: Neck supple. No adenopathy. Thyroid symmetric, normal size,  GI: soft, mildly tender to palpation in the right upper quadrant nondistended, no hernias palpable, no hepatosplenomegaly, normal bowel sounds  Extremities: warm and well perfused, no " edema  SKIN: No suspicious lesions or rashes    LYMPH: no axillary adenopathy    All labs and imaging personally reviewed and significant for: Including her ultrasound    ASSESSMENT:  Kary Perry is a 53 year old female who presents with likely chronic cholecystitis versus biliary colic.     PLAN:   I discussed the pathophysiology of gallbladder disease and complications of cholecystitis and choledocholithiasis with the patient.  I also discussed the risks associated with the procedure including, but not limited to infection, bleeding, conversion to open, bile leak, bile duct injury, retained gallstones, pneumonia, MI, and anesthesia complications with the patient.  I also discussed if a complication did occur it may require further surgical intervention during or after the procedure. The patient indicated understanding of the discussion, asked appropriate questions, and provided consent. I provided the patient an information pamphlet. I have recommended a low fat diet and instructed the patient to go to ER if they developed persistent pain, persistent nausea and vomiting, or yellowness of skin.    Plan for laparoscopic cholecystectomy in October 2023.  She will let me know if she is having any change in symptoms in the interim.    It was my pleasure to participate in the care of Kary Perry in clinic today. Thank you for this consultation.     Emma Schroeder MD  [facility]  [phone]    Please route or send letter to:  Primary Care Provider (PCP) and Referring Provider

## 2023-08-18 ENCOUNTER — OFFICE VISIT (OUTPATIENT)
Dept: SURGERY | Facility: CLINIC | Age: 53
End: 2023-08-18
Payer: COMMERCIAL

## 2023-08-18 ENCOUNTER — TELEPHONE (OUTPATIENT)
Dept: SURGERY | Facility: CLINIC | Age: 53
End: 2023-08-18

## 2023-08-18 VITALS
SYSTOLIC BLOOD PRESSURE: 120 MMHG | BODY MASS INDEX: 26.03 KG/M2 | WEIGHT: 162 LBS | DIASTOLIC BLOOD PRESSURE: 80 MMHG | HEIGHT: 66 IN | HEART RATE: 80 BPM

## 2023-08-18 DIAGNOSIS — K80.50 BILIARY COLIC: Primary | ICD-10-CM

## 2023-08-18 PROCEDURE — 99214 OFFICE O/P EST MOD 30 MIN: CPT | Performed by: SURGERY

## 2023-08-18 NOTE — TELEPHONE ENCOUNTER
Type of surgery: laparoscopic cholecystectomy  Location of surgery: [facility]  Date and time of surgery: 11/1/23 10:55am  Surgeon: Dr Schroeder  Pre-Op Appt Date: pt to schedule  Post-Op Appt Date: pt to schedule   Packet sent out: Yes  Pre-cert/Authorization completed:  Not Applicable  Date: 8/18/23

## 2023-08-21 ENCOUNTER — NURSE TRIAGE (OUTPATIENT)
Dept: FAMILY MEDICINE | Facility: CLINIC | Age: 53
End: 2023-08-21
Payer: COMMERCIAL

## 2023-08-21 DIAGNOSIS — U07.1 INFECTION DUE TO 2019 NOVEL CORONAVIRUS: Primary | ICD-10-CM

## 2023-08-21 NOTE — TELEPHONE ENCOUNTER
RN COVID TREATMENT VISIT  08/21/23      The patient has been triaged and does not require a higher level of care.    Kary Perry  53 year old  Current weight? 160    Has the patient been seen by a primary care provider at an [facility] or [facility] within the past two years? Yes.   Have you been in close proximity to/do you have a known exposure to a person with a confirmed case of influenza? No.     General treatment eligibility:  Date of positive COVID test (PCR or at home)?  8/20/23    Are you or have you been hospitalized for this COVID-19 infection? No.   Have you received monoclonal antibodies or antiviral treatment for COVID-19 since this positive test? No.   Do you have any of the following conditions that place you at risk of being very sick from COVID-19?   - Age 50 years or older  Yes, patient has at least one high risk condition as noted above.     Current COVID symptoms:   - fever or chills  - fatigue  - muscle or body aches  - congestion or runny nose  Yes. Patient has at least one symptom as selected.     How many days since symptoms started? 5 days or less. Established patient, 12 years or older weighing at least 88.2 lbs, who has symptoms that started in the past 5 days, has not been hospitalized nor received treatment already, and is at risk for being very sick from COVID-19.     Treatment eligibility by RN:  Are you currently pregnant or nursing? No  Do you have a clinically significant hypersensitivity to nirmatrelvir or ritonavir, or toxic epidermal necrolysis (TEN) or Hester-Rubén Syndrome? No  Do you have a history of hepatitis, any hepatic impairment on the Problem List (such as Child-Larios Class C, cirrhosis, fatty liver disease, alcoholic liver disease), or was the last liver lab (hepatic panel, ALT, AST, ALK Phos, bilirubin) elevated in the past 6 months? No  Do you have any history of severe renal impairment (eGFR < 30mL/min)? No    Is patient  eligible to continue? Yes, patient meets all eligibility requirements for the RN COVID treatment (as denoted by all no responses above).     Current Outpatient Medications   Medication Sig Dispense Refill    acyclovir (ZOVIRAX) 800 MG tablet Take 1 tablet (800 mg) by mouth every 8 hours With each cold sore outbreak. 9 tablet 3    albuterol (PROAIR HFA/PROVENTIL HFA/VENTOLIN HFA) 108 (90 Base) MCG/ACT inhaler Inhale 1-2 puffs into the lungs every 6 hours for 5 days 8.5 g 0    Calcium 600 MG tablet Take 1 tablet by mouth daily.      Cholecalciferol (VITAMIN D-3) 5000 units TABS Take 1 tablet by mouth daily 90 tablet 3    estradiol (ESTRACE) 0.1 MG/GM vaginal cream Place 2 g vaginally three times a week 72 g 1    Ibuprofen 200 MG capsule Take 200-600 mg by mouth every 4 hours as needed for fever. 100 capsule 3    MULTIVITAMIN TABS   OR 2 tabs daily      Omega-3 Fatty Acids (OMEGA-3 FISH OIL PO) Take 1,200 mg by mouth daily       scopolamine (TRANSDERM) 1 MG/3DAYS 72 hr patch Apply 1 patch to hairless area behind one ear at least 4 hours before travel.  Remove old patch and change every 3 days (72 hours). 5 patch 3    vitamin C (ASCORBIC ACID) 1000 MG TABS Take 1,000 mg by mouth 2 times daily         Medications from List 1 of the standing order (on medications that exclude the use of Paxlovid) that patient is taking: NONE. Is patient taking Ten's Wort? No  Is patient taking Ten's Wort or any meds from List 1? No.   Medications from List 2 of the standing order (on meds that provider needs to adjust) that patient is taking: NONE. Is patient on any of the meds from List 2? No.   Medications from List 3 of standing order (on meds that a RN needs to adjust) that patient is taking: ethinyl estradiol/ethinyl estradiol containing meds (most common - Apri,Aviane, Ortho Cyclen, Ortho Tri Cyclen, Sprintec, Tri-Sprintec, Cryselle): Instructed patient to use a back-up method of birth control or abstain from intercourse  while on Paxlovid until both one month after completion of Paxlovid and a new pill pack starts.  Is patient on any meds from List 3? No.     Paxlovid has an approximate 90% reduction in hospitalization. Paxlovid can possibly cause altered sense of taste, diarrhea (loose, watery stools), high blood pressure, muscle aches.     Would patient like a Paxlovid prescription?   Yes.   Lab Results   Component Value Date    GFRESTIMATED >90 12/08/2022       Was last eGFR reduced? No, eGFR 60 or greater/ No Result on record. Patient can receive the normal renal function dose. Paxlovid Rx sent to [place] pharmacy   [facility]     Temporary change to home medications: None    All medication adjustments (holds, etc) were discussed with the patient and patient was asked to repeat back (teachback) their med adjustment.  Did patient understand med adjustment? No medication adjustments needed.         Reviewed the following instructions with the patient:    Paxlovid (nimatrelvir and ritonavir)    How it works  Two medicines (nirmatrelvir and ritonavir) are taken together. They stop the virus from growing. Less amount of virus is easier for your body to fight.    How to take  Medicine comes in a daily container with both medicine tablets. Take by mouth twice daily (once in the morning, once at night) for 5 days.  The number of tablets to take varies by patient.  Don't chew or break capsules. Swallow whole.    When to take  Take as soon as possible after positive COVID-19 test result, and within 5 days of your first symptoms.    Possible side effects  Can cause altered sense of taste, diarrhea (loose, watery stools), high blood pressure, muscle aches.    Latha Rg RN          Reason for Disposition   [1] HIGH RISK for severe COVID complications (e.g., weak immune system, age > 64 years, obesity with BMI > 25, pregnant, chronic lung disease or other chronic medical condition) AND [2] COVID symptoms (e.g., cough,  fever)  (Exceptions: Already seen by PCP and no new or worsening symptoms.)    Additional Information   Negative: SEVERE difficulty breathing (e.g., struggling for each breath, speaks in single words)   Negative: Difficult to awaken or acting confused (e.g., disoriented, slurred speech)   Negative: Bluish (or gray) lips or face now   Negative: Shock suspected (e.g., cold/pale/clammy skin, too weak to stand, low BP, rapid pulse)   Negative: Sounds like a life-threatening emergency to the triager   Negative: [1] Diagnosed or suspected COVID-19 AND [2] symptoms lasting 3 or more weeks   Negative: [1] COVID-19 exposure AND [2] no symptoms   Negative: COVID-19 vaccine reaction suspected (e.g., fever, headache, muscle aches) occurring 1 to 3 days after getting vaccine   Negative: COVID-19 vaccine, questions about   Negative: [1] Lives with someone known to have influenza (flu test positive) AND [2] flu-like symptoms (e.g., cough, runny nose, sore throat, SOB; with or without fever)   Negative: [1] Adult with possible COVID-19 symptoms AND [2] triager concerned about severity of symptoms or other causes   Negative: COVID-19 and breastfeeding, questions about   Negative: SEVERE or constant chest pain or pressure  (Exception: Mild central chest pain, present only when coughing.)   Negative: MODERATE difficulty breathing (e.g., speaks in phrases, SOB even at rest, pulse 100-120)   Negative: [1] Headache AND [2] stiff neck (can't touch chin to chest)   Negative: Oxygen level (e.g., pulse oximetry) 90 percent or lower   Negative: Chest pain or pressure   Negative: Patient sounds very sick or weak to the triager   Negative: MILD difficulty breathing (e.g., minimal/no SOB at rest, SOB with walking, pulse <100)   Negative: Fever > 103 F (39.4 C)   Negative: [1] Fever > 101 F (38.3 C) AND [2] age > 60 years   Negative: [1] Fever > 100.0 F (37.8 C) AND [2] bedridden (e.g., nursing home patient, CVA, chronic illness, recovering from  surgery)    Protocols used: Coronavirus (COVID-19) Diagnosed or [id number]     constant

## 2023-08-21 NOTE — TELEPHONE ENCOUNTER
COVID Positive/Requesting COVID treatment    Patient is positive for COVID and requesting treatment options.    Date of positive COVID test (PCR or at home)? 08/20/23  Current COVID symptoms: fever or chills, cough, fatigue, sore throat, and congestion or runny nose  Date COVID symptoms began: 08/19/23    Message should be routed to clinic RN pool. Best phone number to use for call back: [phone]

## 2023-09-07 ENCOUNTER — NURSE TRIAGE (OUTPATIENT)
Dept: FAMILY MEDICINE | Facility: CLINIC | Age: 53
End: 2023-09-07
Payer: COMMERCIAL

## 2023-09-07 NOTE — TELEPHONE ENCOUNTER
"FInished dose of paxlovid 2 weeks ago tomorrow. Shortly after she felt she got yeast infection and got OTC 7 day monostat vaginal cream. Now vaginal area and rectal area very sore and irritated    Pt was offered appt today in clinic but time did not work. She then was recommended today in UC but she declined and wants appt tomorrow . She was given in person appt tomorrow with DR Pang and told to come to UC today if it worsens      Reason for Disposition   MILD-MODERATE pain and present > 24 hours  (Exception: Chronic pain.)    Additional Information   Negative: Followed a genital area injury (e.g., vagina, vulva)   Negative: Vaginal bleeding is main symptom   Negative: Vaginal discharge is main symptom   Negative: Pain or burning with passing urine (urination) is main symptom   Negative: Menstrual cramps is main symptom   Negative: Abdomen pain is main symptom   Negative: Pubic lice suspected   Negative: Itching or rash of female genital area (e.g., labia, vagina, vulva)   Negative: Pregnant and labor suspected   Negative: Patient sounds very sick or weak to the triager   Negative: SEVERE pain and not improved 2 hours after pain medicine   Negative: Genital area looks infected (e.g., draining sore, spreading redness) and fever   Negative: Something is hanging out of the vagina and can't easily be pushed back inside    Answer Assessment - Initial Assessment Questions  1. SYMPTOM: \"What's the main symptom you're concerned about?\" (e.g., pain, itching, dryness)      Sore whole area , very sore when she wipes herself  2. LOCATION: \"Where is the  soreness located?\" (e.g., inside/outside, left/right)      Area around anus, area around vaginal opening  3. ONSET: \"When did the  this   start?\"      Started with using monostat 10 days ago which helped a little  4. PAIN: \"Is there any pain?\" If Yes, ask: \"How bad is it?\" (Scale: 1-10; mild, moderate, severe)    -  MILD (1-3): Doesn't interfere with normal activities.     " "-  MODERATE (4-7): Interferes with normal activities (e.g., work or school) or awakens from sleep.      -  SEVERE (8-10): Excruciating pain, unable to do any normal activities.      \"Like glass\"  5. ITCHING: \"Is there any itching?\" If Yes, ask: \"How bad is it?\" (Scale: 1-10; mild, moderate, severe)      yes  6. CAUSE: \"What do you think is causing the discharge?\" \"Have you had the same problem before? What happened then?\"      No discharge  7. OTHER SYMPTOMS: \"Do you have any other symptoms?\" (e.g., fever, itching, vaginal bleeding, pain with urination, injury to genital area, vaginal foreign body)      Has spread to anal area    Protocols used: Vaginal Symptoms-A-OH    "

## 2023-09-08 ENCOUNTER — OFFICE VISIT (OUTPATIENT)
Dept: FAMILY MEDICINE | Facility: CLINIC | Age: 53
End: 2023-09-08
Payer: COMMERCIAL

## 2023-09-08 VITALS
SYSTOLIC BLOOD PRESSURE: 119 MMHG | TEMPERATURE: 97.2 F | HEART RATE: 76 BPM | RESPIRATION RATE: 16 BRPM | DIASTOLIC BLOOD PRESSURE: 73 MMHG | OXYGEN SATURATION: 99 % | BODY MASS INDEX: 26.2 KG/M2 | HEIGHT: 66 IN | WEIGHT: 163 LBS

## 2023-09-08 DIAGNOSIS — N94.9 VAGINAL DISCOMFORT: Primary | ICD-10-CM

## 2023-09-08 DIAGNOSIS — B00.1 COLD SORE: ICD-10-CM

## 2023-09-08 LAB
CLUE CELLS: ABNORMAL
TRICHOMONAS, WET PREP: ABNORMAL
WBC'S/HIGH POWER FIELD, WET PREP: ABNORMAL
YEAST, WET PREP: ABNORMAL

## 2023-09-08 PROCEDURE — 87210 SMEAR WET MOUNT SALINE/INK: CPT | Performed by: INTERNAL MEDICINE

## 2023-09-08 PROCEDURE — 99213 OFFICE O/P EST LOW 20 MIN: CPT | Performed by: INTERNAL MEDICINE

## 2023-09-08 RX ORDER — FLUCONAZOLE 150 MG/1
150 TABLET ORAL
Qty: 3 TABLET | Refills: 0 | Status: SHIPPED | OUTPATIENT
Start: 2023-09-08 | End: 2023-09-15

## 2023-09-08 RX ORDER — ACYCLOVIR 800 MG/1
800 TABLET ORAL EVERY 8 HOURS
Qty: 9 TABLET | Refills: 3 | Status: SHIPPED | OUTPATIENT
Start: 2023-09-08

## 2023-09-08 ASSESSMENT — PAIN SCALES - GENERAL: PAINLEVEL: NO PAIN (0)

## 2023-09-08 NOTE — PROGRESS NOTES
"  Assessment & Plan     (N94.9) Vaginal discomfort  (primary encounter diagnosis)  Comment: Treated with 7 days of topical miconazole with incomplete resolution of symptoms; wet prep negative- recommend treating with fluconazole to completely eradicate yeast.  Plan: Wet prep - Clinic Collect, fluconazole         (DIFLUCAN) 150 MG tablet          (B00.1) Cold sore  Comment:   Plan: acyclovir (ZOVIRAX) 800 MG tablet refilled               BMI:   Estimated body mass index is 26.31 kg/m  as calculated from the following:    Height as of this encounter: 1.676 m (5' 6\").    Weight as of this encounter: 73.9 kg (163 lb).   Weight management plan: not addressed    There are no Patient Instructions on file for this visit.    Rae Pang MD  [facility]    Ovi Garcia is a 53 year old, presenting for the following health issues:  Vaginal Problem (vaginal discomfort, recent possible yeast infection rx'd with OTC cream)        9/8/2023     2:11 PM   Additional Questions   Roomed by Noy Ramos   Accompanied by Self       History of Present Illness       Reason for visit:  Vaginal and anal irritation  Symptom onset:  More than a month  Symptom intensity:  Mild  Symptom progression:  Improving  Had these symptoms before:  Yes    She eats 4 or more servings of fruits and vegetables daily.She consumes 0 sweetened beverage(s) daily.She exercises with enough effort to increase her heart rate 10 to 19 minutes per day.  She exercises with enough effort to increase her heart rate 4 days per week.   She is taking medications regularly.     Three weeks ago had COVID- got paxlovid, took for 5 days.  Had a sore on her tongue and had diarrhea.  Feeling about 95% now.  Feels similar to when saw Dr. Allison at [place].    Finished a 7 day miconazole cream, but starting at 6-7 days still noticing vaginal irritation.  She has vaginal and anal itching.  Has had hemorrhoid banding in the past, tried some " "witch hazel.  Typically, things are better in the morning.    Wiping anus is like using broken glass.  Has tried some estrogen intravaginally because was diagnosed with vaginal dryness in the past.      Keep up with Cass and kombucha.      Review of Systems   Genitourinary:  Positive for vaginal discharge.            Objective    /73 (BP Location: Right arm, Patient Position: Sitting, Cuff Size: Adult Regular)   Pulse 76   Temp 97.2  F (36.2  C) (Temporal)   Resp 16   Ht 1.676 m (5' 6\")   Wt 73.9 kg (163 lb)   LMP  (LMP Unknown)   SpO2 99%   Breastfeeding No   BMI 26.31 kg/m    Body mass index is 26.31 kg/m .  Physical Exam   GENERAL: healthy, alert and no distress   (female): normal female external genitalia, normal urethral meatus, mildly erythematous vaginal mucosa, normal cervix/adnexa/uterus without masses or discharge  MS: no gross musculoskeletal defects noted, no edema  PSYCH: mentation appears normal, affect normal/bright                    Answers submitted by the patient for this visit:  General Questionnaire (Submitted on 9/8/2023)  Chief Complaint: Chronic problems general questions HPI Form  What is the reason for your visit today? : vaginal and anal irritation  How many servings of fruits and vegetables do you eat daily?: 4 or more  On average, how many sweetened beverages do you drink each day (Examples: soda, juice, sweet tea, etc.  Do NOT count diet or artificially sweetened beverages)?: 0  How many minutes a day do you exercise enough to make your heart beat faster?: 10 to 19  How many days a week do you exercise enough to make your heart beat faster?: 4  How many days per week do you miss taking your medication?: 0    "

## 2023-10-16 ASSESSMENT — ENCOUNTER SYMPTOMS
SHORTNESS OF BREATH: 0
NAUSEA: 0
FEVER: 0
HEARTBURN: 0
ABDOMINAL PAIN: 0
BREAST MASS: 0
SORE THROAT: 0
ARTHRALGIAS: 0
PALPITATIONS: 0
HEMATOCHEZIA: 0
NERVOUS/ANXIOUS: 0
CONSTIPATION: 0
EYE PAIN: 0
DIZZINESS: 0
DIARRHEA: 0
CHILLS: 0
JOINT SWELLING: 0
MYALGIAS: 0
PARESTHESIAS: 0
HEMATURIA: 0
FREQUENCY: 0
HEADACHES: 0
COUGH: 0
DYSURIA: 0
WEAKNESS: 0

## 2023-10-19 ENCOUNTER — OFFICE VISIT (OUTPATIENT)
Dept: FAMILY MEDICINE | Facility: CLINIC | Age: 53
End: 2023-10-19
Payer: COMMERCIAL

## 2023-10-19 VITALS
SYSTOLIC BLOOD PRESSURE: 106 MMHG | RESPIRATION RATE: 17 BRPM | TEMPERATURE: 98.2 F | HEART RATE: 73 BPM | HEIGHT: 66 IN | WEIGHT: 161 LBS | OXYGEN SATURATION: 99 % | BODY MASS INDEX: 25.88 KG/M2 | DIASTOLIC BLOOD PRESSURE: 71 MMHG

## 2023-10-19 DIAGNOSIS — Z00.00 ROUTINE GENERAL MEDICAL EXAMINATION AT A HEALTH CARE FACILITY: Primary | ICD-10-CM

## 2023-10-19 DIAGNOSIS — K80.50 BILIARY COLIC: ICD-10-CM

## 2023-10-19 DIAGNOSIS — Z01.818 PREOP GENERAL PHYSICAL EXAM: Primary | ICD-10-CM

## 2023-10-19 DIAGNOSIS — Z12.31 VISIT FOR SCREENING MAMMOGRAM: ICD-10-CM

## 2023-10-19 DIAGNOSIS — Z13.0 SCREENING FOR IRON DEFICIENCY ANEMIA: ICD-10-CM

## 2023-10-19 DIAGNOSIS — Z13.1 SCREENING FOR DIABETES MELLITUS: ICD-10-CM

## 2023-10-19 DIAGNOSIS — T75.3XXA MOTION SICKNESS, INITIAL ENCOUNTER: ICD-10-CM

## 2023-10-19 DIAGNOSIS — Z13.220 LIPID SCREENING: ICD-10-CM

## 2023-10-19 LAB
ALBUMIN SERPL BCG-MCNC: 4.4 G/DL (ref 3.5–5.2)
ALP SERPL-CCNC: 69 U/L (ref 35–104)
ALT SERPL W P-5'-P-CCNC: 19 U/L (ref 0–50)
ANION GAP SERPL CALCULATED.3IONS-SCNC: 10 MMOL/L (ref 7–15)
AST SERPL W P-5'-P-CCNC: 36 U/L (ref 0–45)
BILIRUB SERPL-MCNC: 0.5 MG/DL
BUN SERPL-MCNC: 12.3 MG/DL (ref 6–20)
CALCIUM SERPL-MCNC: 9.8 MG/DL (ref 8.6–10)
CHLORIDE SERPL-SCNC: 103 MMOL/L (ref 98–107)
CHOLEST SERPL-MCNC: 217 MG/DL
CREAT SERPL-MCNC: 0.56 MG/DL (ref 0.51–0.95)
DEPRECATED HCO3 PLAS-SCNC: 24 MMOL/L (ref 22–29)
EGFRCR SERPLBLD CKD-EPI 2021: >90 ML/MIN/1.73M2
ERYTHROCYTE [DISTWIDTH] IN BLOOD BY AUTOMATED COUNT: 12.3 % (ref 10–15)
GLUCOSE SERPL-MCNC: 85 MG/DL (ref 70–99)
HCT VFR BLD AUTO: 38.4 % (ref 35–47)
HDLC SERPL-MCNC: 54 MG/DL
HGB BLD-MCNC: 12.6 G/DL (ref 11.7–15.7)
LDLC SERPL CALC-MCNC: 129 MG/DL
MCH RBC QN AUTO: 29.6 PG (ref 26.5–33)
MCHC RBC AUTO-ENTMCNC: 32.8 G/DL (ref 31.5–36.5)
MCV RBC AUTO: 90 FL (ref 78–100)
NONHDLC SERPL-MCNC: 163 MG/DL
PLATELET # BLD AUTO: 333 10E3/UL (ref 150–450)
POTASSIUM SERPL-SCNC: 4.2 MMOL/L (ref 3.4–5.3)
PROT SERPL-MCNC: 7 G/DL (ref 6.4–8.3)
RBC # BLD AUTO: 4.26 10E6/UL (ref 3.8–5.2)
SODIUM SERPL-SCNC: 137 MMOL/L (ref 135–145)
TRIGL SERPL-MCNC: 170 MG/DL
WBC # BLD AUTO: 7.3 10E3/UL (ref 4–11)

## 2023-10-19 PROCEDURE — 99213 OFFICE O/P EST LOW 20 MIN: CPT | Mod: 25 | Performed by: NURSE PRACTITIONER

## 2023-10-19 PROCEDURE — 36415 COLL VENOUS BLD VENIPUNCTURE: CPT | Performed by: NURSE PRACTITIONER

## 2023-10-19 PROCEDURE — 80061 LIPID PANEL: CPT | Performed by: NURSE PRACTITIONER

## 2023-10-19 PROCEDURE — 80053 COMPREHEN METABOLIC PANEL: CPT | Performed by: NURSE PRACTITIONER

## 2023-10-19 PROCEDURE — 90471 IMMUNIZATION ADMIN: CPT | Performed by: NURSE PRACTITIONER

## 2023-10-19 PROCEDURE — 85027 COMPLETE CBC AUTOMATED: CPT | Performed by: NURSE PRACTITIONER

## 2023-10-19 PROCEDURE — 99396 PREV VISIT EST AGE 40-64: CPT | Mod: 25 | Performed by: NURSE PRACTITIONER

## 2023-10-19 PROCEDURE — 90682 RIV4 VACC RECOMBINANT DNA IM: CPT | Performed by: NURSE PRACTITIONER

## 2023-10-19 RX ORDER — SCOLOPAMINE TRANSDERMAL SYSTEM 1 MG/1
PATCH, EXTENDED RELEASE TRANSDERMAL
Qty: 5 PATCH | Refills: 3 | Status: SHIPPED | OUTPATIENT
Start: 2023-10-19

## 2023-10-19 ASSESSMENT — PAIN SCALES - GENERAL: PAINLEVEL: NO PAIN (0)

## 2023-10-19 ASSESSMENT — ENCOUNTER SYMPTOMS
DIZZINESS: 0
EYE PAIN: 0
SHORTNESS OF BREATH: 0
BREAST MASS: 0
ABDOMINAL PAIN: 0
HEADACHES: 0
FREQUENCY: 0
ARTHRALGIAS: 0
WEAKNESS: 0
PALPITATIONS: 0
COUGH: 0
JOINT SWELLING: 0
SORE THROAT: 0
PARESTHESIAS: 0
DIARRHEA: 0
MYALGIAS: 0
CHILLS: 0
DYSURIA: 0
NAUSEA: 0
NERVOUS/ANXIOUS: 0
FEVER: 0
CONSTIPATION: 0
HEMATURIA: 0
HEMATOCHEZIA: 0
HEARTBURN: 0

## 2023-10-19 NOTE — PROGRESS NOTES
[address]  [address]  [place] [address]  Phone: [phone]  Fax: [phone]  Primary Provider: Alysha Marsh  Pre-op Performing Provider: ALYSHA MARSH      PREOPERATIVE EVALUATION:  Today's date: 10/19/2023    Radha is a 53 year old female who presents for a preoperative evaluation.      Surgical Information:  Surgery/Procedure: Laparoscopic Cholecystectomy  Surgery Location: [facility]  Surgeon: Andressa Schroeder MD  Surgery Date: 11/1/2023  Time of Surgery: 10:45am  Where patient plans to recover: At home with family  Fax number for surgical facility: Note does not need to be faxed, will be available electronically in Epic.    Assessment & Plan     The proposed surgical procedure is considered INTERMEDIATE risk.    Preop general physical exam  Reviewed medical/social/family history and health maintenance     Biliary colic  Plan for routine Lap Jessica            - No identified additional risk factors other than previously addressed    Antiplatelet or Anticoagulation Medication Instructions:   - Patient is on no antiplatelet or anticoagulation medications.    Additional Medication Instructions:  Patient is on no additional chronic medications    RECOMMENDATION:  APPROVAL GIVEN to proceed with proposed procedure, without further diagnostic evaluation.            Subjective       HPI related to upcoming procedure: Lap Jessica          10/19/2023     9:04 AM   Preop Questions   1. Have you ever had a heart attack or stroke? No   2. Have you ever had surgery on your heart or blood vessels, such as a stent placement, a coronary artery bypass, or surgery on an artery in your head, neck, heart, or legs? No   3. Do you have chest pain with activity? No   4. Do you have a history of  heart failure? No   5. Do you currently have a cold, bronchitis or symptoms of other infection? No   6. Do you have a cough, shortness of breath, or wheezing? No   7. Do you or  anyone in your family have previous history of blood clots? No   8. Do you or does anyone in your family have a serious bleeding problem such as prolonged bleeding following surgeries or cuts? No   9. Have you ever had problems with anemia or been told to take iron pills? No   10. Have you had any abnormal blood loss such as black, tarry or bloody stools, or abnormal vaginal bleeding? No   11. Have you ever had a blood transfusion? No   12. Are you willing to have a blood transfusion if it is medically needed before, during, or after your surgery? Yes   13. Have you or any of your relatives ever had problems with anesthesia? No   14. Do you have sleep apnea, excessive snoring or daytime drowsiness? No   15. Do you have any artifical heart valves or other implanted medical devices like a pacemaker, defibrillator, or continuous glucose monitor? No   16. Do you have artificial joints? No   17. Are you allergic to latex? No   18. Is there any chance that you may be pregnant? No     Health Care Directive:  Patient has a Health Care Directive on file      Preoperative Review of :   reviewed - no record of controlled substances prescribed.          Review of Systems  CONSTITUTIONAL: NEGATIVE for fever, chills, change in weight  INTEGUMENTARY/SKIN: NEGATIVE for worrisome rashes, moles or lesions  EYES: NEGATIVE for vision changes or irritation  ENT/MOUTH: NEGATIVE for ear, mouth and throat problems  RESP: NEGATIVE for significant cough or SOB  CV: NEGATIVE for chest pain, palpitations or peripheral edema  GI: NEGATIVE for nausea, abdominal pain, heartburn, or change in bowel habits  : NEGATIVE for frequency, dysuria, or hematuria  MUSCULOSKELETAL: NEGATIVE for significant arthralgias or myalgia  NEURO: NEGATIVE for weakness, dizziness or paresthesias  ENDOCRINE: NEGATIVE for temperature intolerance, skin/hair changes  HEME: NEGATIVE for bleeding problems  PSYCHIATRIC: NEGATIVE for changes in mood or  affect    Patient Active Problem List    Diagnosis Date Noted    History of ovarian cyst 11/18/2014     Priority: Medium     Problem list name updated by automated process. Provider to review      Neck sprain 05/12/2011     Priority: Medium    Dysphagia 02/03/2011     Priority: Medium    Vitamin D deficiency 02/03/2011     Priority: Medium    CARDIOVASCULAR SCREENING; LDL GOAL LESS THAN 160 10/31/2010     Priority: Medium    DUB (dysfunctional uterine bleeding) 05/12/2008     Priority: Medium     Endometrial ablation 2008      Herpes simplex virus (HSV) infection 09/29/2004     Priority: Medium     Problem list name updated by automated process. Provider to review      Undiagnosed cardiac murmurs 03/11/2004     Priority: Medium    Diffuse cystic mastopathy 03/11/2004     Priority: Medium     1991 had mammogram - left breast more dense        Past Medical History:   Diagnosis Date    Diffuse cystic mastopathy     1991 had mammogram - left breast more dense    Family history of blood disorder     related to a transfusion in her paternal grandmother    FH: non-Hodgkin's lymphoma     Mother 2009    Herpes simplex without mention of complication     herpes labialis    Infectious mononucleosis 1987    Other specified temporomandibular joint disorders     Undiagnosed cardiac murmurs     innocent murmur     Past Surgical History:   Procedure Laterality Date    BREAST SURGERY  3/2020    Breast reduction    COLONOSCOPY  1/2018    ENT SURGERY  3/1994    Tonsillectomy    GYN SURGERY  2009    Ablation    HYSTEROSCOPY  10/2004    D&C/hysteroscopy/polypectomy    HYSTEROSCOPY  05/2008    hysteroscopy/polypectomy    HYSTEROSCOPY  11/02/2009    hysteroscopy/D&C/endometrial ablation    ZZC NONSPECIFIC PROCEDURE  1993    Tonsillectomy     Current Outpatient Medications   Medication Sig Dispense Refill    acyclovir (ZOVIRAX) 800 MG tablet Take 1 tablet (800 mg) by mouth every 8 hours With each cold sore outbreak. 9 tablet 3    Calcium  600 MG tablet Take 1 tablet by mouth daily.      Cholecalciferol (VITAMIN D-3) 5000 units TABS Take 1 tablet by mouth daily 90 tablet 3    estradiol (ESTRACE) 0.1 MG/GM vaginal cream Place 2 g vaginally three times a week 72 g 1    Ibuprofen 200 MG capsule Take 200-600 mg by mouth every 4 hours as needed for fever. 100 capsule 3    MULTIVITAMIN TABS   OR 2 tabs daily      Omega-3 Fatty Acids (OMEGA-3 FISH OIL PO) Take 1,200 mg by mouth daily       scopolamine (TRANSDERM) 1 MG/3DAYS 72 hr patch Apply 1 patch to hairless area behind one ear at least 4 hours before travel.  Remove old patch and change every 3 days (72 hours). 5 patch 3    vitamin C (ASCORBIC ACID) 1000 MG TABS Take 1,000 mg by mouth 2 times daily         Allergies   Allergen Reactions    Eggs [Chicken-Derived Products (Egg)] Nausea    Sulfa Antibiotics Hives        Social History     Tobacco Use    Smoking status: Never    Smokeless tobacco: Never   Substance Use Topics    Alcohol use: Yes     Comment: 1-2 per week       History   Drug Use No         Objective     LMP  (LMP Unknown)     Physical Exam    GENERAL APPEARANCE: healthy, alert and no distress     EYES: EOMI, PERRL     HENT: ear canals and TM's normal and nose and mouth without ulcers or lesions     NECK: no adenopathy, no asymmetry, masses, or scars and thyroid normal to palpation     RESP: lungs clear to auscultation - no rales, rhonchi or wheezes     CV: regular rates and rhythm, normal S1 S2, no S3 or S4 and no murmur, click or rub     ABDOMEN:  soft, nontender, no HSM or masses and bowel sounds normal     MS: extremities normal- no gross deformities noted, no evidence of inflammation in joints, FROM in all extremities.     SKIN: no suspicious lesions or rashes     NEURO: Normal strength and tone, sensory exam grossly normal, mentation intact and speech normal     PSYCH: mentation appears normal. and affect normal/bright     LYMPHATICS: No cervical adenopathy    Recent Labs   Lab Test  12/08/22  0935 12/07/21  0945   HGB 12.8 13.0    342    136   POTASSIUM 4.7 4.0   CR 0.58 0.59   A1C  --  5.5        Diagnostics:  Labs pending at this time.  Results will be reviewed when available.   No EKG required, no history of coronary heart disease, significant arrhythmia, peripheral arterial disease or other structural heart disease.    Revised Cardiac Risk Index (RCRI):  The patient has the following serious cardiovascular risks for perioperative complications:   - No serious cardiac risks = 0 points     RCRI Interpretation: 0 points: Class I (very low risk - 0.4% complication rate)         Signed Electronically by: RONY Jones CNP  Copy of this evaluation report is provided to requesting physician.

## 2023-10-19 NOTE — PATIENT INSTRUCTIONS
Preparing for Your Surgery  Getting started  A nurse will call you to review your health history and instructions. They will give you an arrival time based on your scheduled surgery time. Please be ready to share:  Your doctor's clinic name and phone number  Your medical, surgical, and anesthesia history  A list of allergies and sensitivities  A list of medicines, including herbal treatments and over-the-counter drugs  Whether the patient has a legal guardian (ask how to send us the papers in advance)  Please tell us if you're pregnant--or if there's any chance you might be pregnant. Some surgeries may injure a fetus (unborn baby), so they require a pregnancy test. Surgeries that are safe for a fetus don't always need a test, and you can choose whether to have one.   If you have a child who's having surgery, please ask for a copy of Preparing for Your Child's Surgery.    Preparing for surgery  Within 10 to 30 days of surgery: Have a pre-op exam (sometimes called an H&P, or History and Physical). This can be done at a clinic or pre-operative center.  If you're having a , you may not need this exam. Talk to your care team.  At your pre-op exam, talk to your care team about all medicines you take. If you need to stop any medicines before surgery, ask when to start taking them again.  We do this for your safety. Many medicines can make you bleed too much during surgery. Some change how well surgery (anesthesia) drugs work.  Call your insurance company to let them know you're having surgery. (If you don't have insurance, call [phone].)  Call your clinic if there's any change in your health. This includes signs of a cold or flu (sore throat, runny nose, cough, rash, fever). It also includes a scrape or scratch near the surgery site.  If you have questions on the day of surgery, call your hospital or surgery center.  Eating and drinking guidelines  For your safety: Unless your surgeon tells you otherwise,  follow the guidelines below.  Eat and drink as usual until 8 hours before you arrive for surgery. After that, no food or milk.  Drink clear liquids until 2 hours before you arrive. These are liquids you can see through, like water, Gatorade, and Propel Water. They also include plain black coffee and tea (no cream or milk), candy, and breath mints. You can spit out gum when you arrive.  If you drink alcohol: Stop drinking it the night before surgery.  If your care team tells you to take medicine on the morning of surgery, it's okay to take it with a sip of water.  Preventing infection  Shower or bathe the night before and morning of your surgery. Follow the instructions your clinic gave you. (If no instructions, use regular soap.)  Don't shave or clip hair near your surgery site. We'll remove the hair if needed.  Don't smoke or vape the morning of surgery. You may chew nicotine gum up to 2 hours before surgery. A nicotine patch is okay.  Note: Some surgeries require you to completely quit smoking and nicotine. Check with your surgeon.  Your care team will make every effort to keep you safe from infection. We will:  Clean our hands often with soap and water (or an alcohol-based hand rub).  Clean the skin at your surgery site with a special soap that kills germs.  Give you a special gown to keep you warm. (Cold raises the risk of infection.)  Wear special hair covers, masks, gowns and gloves during surgery.  Give antibiotic medicine, if prescribed. Not all surgeries need antibiotics.  What to bring on the day of surgery  Photo ID and insurance card  Copy of your health care directive, if you have one  Glasses and hearing aids (bring cases)  You can't wear contacts during surgery  Inhaler and eye drops, if you use them (tell us about these when you arrive)  CPAP machine or breathing device, if you use them  A few personal items, if spending the night  If you have . . .  A pacemaker, ICD (cardiac defibrillator) or other  implant: Bring the ID card.  An implanted stimulator: Bring the remote control.  A legal guardian: Bring a copy of the certified (court-stamped) guardianship papers.  Please remove any jewelry, including body piercings. Leave jewelry and other valuables at home.  If you're going home the day of surgery  You must have a responsible adult drive you home. They should stay with you overnight as well.  If you don't have someone to stay with you, and you aren't safe to go home alone, we may keep you overnight. Insurance often won't pay for this.  After surgery  If it's hard to control your pain or you need more pain medicine, please call your surgeon's office.  Questions?   If you have any questions for your care team, list them here: _________________________________________________________________________________________________________________________________________________________________________ ____________________________________ ____________________________________ ____________________________________  For informational purposes only. Not to replace the advice of your health care provider. Copyright   2003, 2019 [facility]. All rights reserved. Clinically reviewed by Melina Tyler MD. [employer] 487335 - REV 12/22.

## 2023-10-19 NOTE — PROGRESS NOTES
SUBJECTIVE:   CC: Radha is an 53 year old who presents for preventive health visit.       10/19/2023     8:28 AM   Additional Questions   Roomed by Padmaja HOLT   Accompanied by Spouse, Estrellita       Healthy Habits:     Getting at least 3 servings of Calcium per day:  Yes    Bi-annual eye exam:  Yes    Dental care twice a year:  Yes    Sleep apnea or symptoms of sleep apnea:  None    Diet:  Regular (no restrictions) and Low fat/cholesterol    Frequency of exercise:  6-7 days/week    Duration of exercise:  15-30 minutes    Taking medications regularly:  Not Applicable    Additional concerns today:  No            Social History     Tobacco Use    Smoking status: Never    Smokeless tobacco: Never   Substance Use Topics    Alcohol use: Yes     Comment: 1-2 per week             10/16/2023     8:59 PM   Alcohol Use   Prescreen: >3 drinks/day or >7 drinks/week? No          No data to display              Reviewed orders with patient.  Reviewed health maintenance and updated orders accordingly - Yes  Lab work is in process    Breast Cancer Screenin/2/2021     6:52 PM 2022     7:44 AM   Breast CA Risk Assessment (FHS-7)   Do you have a family history of breast, colon, or ovarian cancer? Yes No / Unknown         Mammogram Screening: Recommended annual mammography  Pertinent mammograms are reviewed under the imaging tab.    History of abnormal Pap smear: NO - age 30-65 PAP every 5 years with negative HPV co-testing recommended      Latest Ref Rng & Units 2020     8:39 AM 2020     7:21 AM 2018     8:35 AM   PAP / HPV   PAP (Historical)   NIL     HPV 16 DNA NEG^Negative Negative   Negative    HPV 18 DNA NEG^Negative Negative   Negative    Other HR HPV NEG^Negative Negative   Negative      Reviewed and updated as needed this visit by clinical staff   Tobacco  Allergies  Meds              Reviewed and updated as needed this visit by Provider                     Review of Systems   Constitutional:  " Negative for chills and fever.   HENT:  Negative for congestion, ear pain, hearing loss and sore throat.    Eyes:  Negative for pain and visual disturbance.   Respiratory:  Negative for cough and shortness of breath.    Cardiovascular:  Negative for chest pain, palpitations and peripheral edema.   Gastrointestinal:  Negative for abdominal pain, constipation, diarrhea, heartburn, hematochezia and nausea.   Breasts:  Negative for tenderness, breast mass and discharge.   Genitourinary:  Negative for dysuria, frequency, genital sores, hematuria, pelvic pain, urgency, vaginal bleeding and vaginal discharge.   Musculoskeletal:  Negative for arthralgias, joint swelling and myalgias.   Skin:  Negative for rash.   Neurological:  Negative for dizziness, weakness, headaches and paresthesias.   Psychiatric/Behavioral:  Negative for mood changes. The patient is not nervous/anxious.           OBJECTIVE:   /71 (BP Location: Right arm, Patient Position: Sitting, Cuff Size: Adult Regular)   Pulse 73   Temp 98.2  F (36.8  C) (Temporal)   Resp 17   Ht 1.664 m (5' 5.5\")   Wt 73 kg (161 lb)   LMP  (LMP Unknown)   SpO2 99%   BMI 26.38 kg/m    Physical Exam  GENERAL APPEARANCE: healthy, alert and no distress  EYES: Eyes grossly normal to inspection, PERRL and conjunctivae and sclerae normal  HENT: ear canals and TM's normal, nose and mouth without ulcers or lesions, oropharynx clear and oral mucous membranes moist  NECK: no adenopathy, no asymmetry, masses, or scars and thyroid normal to palpation  RESP: lungs clear to auscultation - no rales, rhonchi or wheezes  CV: regular rate and rhythm, normal S1 S2, no S3 or S4, no murmur, click or rub, no peripheral edema and peripheral pulses strong  ABDOMEN: soft, nontender, no hepatosplenomegaly, no masses and bowel sounds normal  MS: no musculoskeletal defects are noted and gait is age appropriate without ataxia  SKIN: no suspicious lesions or rashes  NEURO: Normal strength and " tone, sensory exam grossly normal, mentation intact and speech normal  PSYCH: mentation appears normal and affect normal/bright        ASSESSMENT/PLAN:   (Z00.00) Routine general medical examination at a health care facility  (primary encounter diagnosis)  Comment: Reviewed medical/social/family history and health maintenance   Plan:       (T75.3XXA) Motion sickness, initial encounter  Comment: uses for sailing trips.    Plan: scopolamine (TRANSDERM) 1 MG/3DAYS 72 hr patch            (Z12.31) Visit for screening mammogram  Comment:   Plan: MA SCREENING DIGITAL BILAT - Future  (s+30)            (Z13.220) Lipid screening  Comment:   Plan: Lipid panel reflex to direct LDL Fasting            (Z13.0) Screening for iron deficiency anemia  Comment:   Plan: CBC with platelets            (Z13.1) Screening for diabetes mellitus  Comment:   Plan: Comprehensive metabolic panel (BMP + Alb, Alk         Phos, ALT, AST, Total. Bili, TP)            Patient has been advised of split billing requirements and indicates understanding: Yes      COUNSELING:  Reviewed preventive health counseling, as reflected in patient instructions        She reports that she has never smoked. She has never used smokeless tobacco.          RONY Jones CNP  [facility]

## 2023-10-19 NOTE — H&P (VIEW-ONLY)
[address]  [address]  [place] [address]  Phone: [phone]  Fax: [phone]  Primary Provider: Alysha Marsh  Pre-op Performing Provider: ALYSHA MARSH      PREOPERATIVE EVALUATION:  Today's date: 10/19/2023    Radha is a 53 year old female who presents for a preoperative evaluation.      Surgical Information:  Surgery/Procedure: Laparoscopic Cholecystectomy  Surgery Location: [facility]  Surgeon: Andressa Schroeder MD  Surgery Date: 11/1/2023  Time of Surgery: 10:45am  Where patient plans to recover: At home with family  Fax number for surgical facility: Note does not need to be faxed, will be available electronically in Epic.    Assessment & Plan     The proposed surgical procedure is considered INTERMEDIATE risk.    Preop general physical exam  Reviewed medical/social/family history and health maintenance     Biliary colic  Plan for routine Lap Jessica            - No identified additional risk factors other than previously addressed    Antiplatelet or Anticoagulation Medication Instructions:   - Patient is on no antiplatelet or anticoagulation medications.    Additional Medication Instructions:  Patient is on no additional chronic medications    RECOMMENDATION:  APPROVAL GIVEN to proceed with proposed procedure, without further diagnostic evaluation.            Subjective       HPI related to upcoming procedure: Lap Jessica          10/19/2023     9:04 AM   Preop Questions   1. Have you ever had a heart attack or stroke? No   2. Have you ever had surgery on your heart or blood vessels, such as a stent placement, a coronary artery bypass, or surgery on an artery in your head, neck, heart, or legs? No   3. Do you have chest pain with activity? No   4. Do you have a history of  heart failure? No   5. Do you currently have a cold, bronchitis or symptoms of other infection? No   6. Do you have a cough, shortness of breath, or wheezing? No   7. Do you or  anyone in your family have previous history of blood clots? No   8. Do you or does anyone in your family have a serious bleeding problem such as prolonged bleeding following surgeries or cuts? No   9. Have you ever had problems with anemia or been told to take iron pills? No   10. Have you had any abnormal blood loss such as black, tarry or bloody stools, or abnormal vaginal bleeding? No   11. Have you ever had a blood transfusion? No   12. Are you willing to have a blood transfusion if it is medically needed before, during, or after your surgery? Yes   13. Have you or any of your relatives ever had problems with anesthesia? No   14. Do you have sleep apnea, excessive snoring or daytime drowsiness? No   15. Do you have any artifical heart valves or other implanted medical devices like a pacemaker, defibrillator, or continuous glucose monitor? No   16. Do you have artificial joints? No   17. Are you allergic to latex? No   18. Is there any chance that you may be pregnant? No     Health Care Directive:  Patient has a Health Care Directive on file      Preoperative Review of :   reviewed - no record of controlled substances prescribed.          Review of Systems  CONSTITUTIONAL: NEGATIVE for fever, chills, change in weight  INTEGUMENTARY/SKIN: NEGATIVE for worrisome rashes, moles or lesions  EYES: NEGATIVE for vision changes or irritation  ENT/MOUTH: NEGATIVE for ear, mouth and throat problems  RESP: NEGATIVE for significant cough or SOB  CV: NEGATIVE for chest pain, palpitations or peripheral edema  GI: NEGATIVE for nausea, abdominal pain, heartburn, or change in bowel habits  : NEGATIVE for frequency, dysuria, or hematuria  MUSCULOSKELETAL: NEGATIVE for significant arthralgias or myalgia  NEURO: NEGATIVE for weakness, dizziness or paresthesias  ENDOCRINE: NEGATIVE for temperature intolerance, skin/hair changes  HEME: NEGATIVE for bleeding problems  PSYCHIATRIC: NEGATIVE for changes in mood or  affect    Patient Active Problem List    Diagnosis Date Noted    History of ovarian cyst 11/18/2014     Priority: Medium     Problem list name updated by automated process. Provider to review      Neck sprain 05/12/2011     Priority: Medium    Dysphagia 02/03/2011     Priority: Medium    Vitamin D deficiency 02/03/2011     Priority: Medium    CARDIOVASCULAR SCREENING; LDL GOAL LESS THAN 160 10/31/2010     Priority: Medium    DUB (dysfunctional uterine bleeding) 05/12/2008     Priority: Medium     Endometrial ablation 2008      Herpes simplex virus (HSV) infection 09/29/2004     Priority: Medium     Problem list name updated by automated process. Provider to review      Undiagnosed cardiac murmurs 03/11/2004     Priority: Medium    Diffuse cystic mastopathy 03/11/2004     Priority: Medium     1991 had mammogram - left breast more dense        Past Medical History:   Diagnosis Date    Diffuse cystic mastopathy     1991 had mammogram - left breast more dense    Family history of blood disorder     related to a transfusion in her paternal grandmother    FH: non-Hodgkin's lymphoma     Mother 2009    Herpes simplex without mention of complication     herpes labialis    Infectious mononucleosis 1987    Other specified temporomandibular joint disorders     Undiagnosed cardiac murmurs     innocent murmur     Past Surgical History:   Procedure Laterality Date    BREAST SURGERY  3/2020    Breast reduction    COLONOSCOPY  1/2018    ENT SURGERY  3/1994    Tonsillectomy    GYN SURGERY  2009    Ablation    HYSTEROSCOPY  10/2004    D&C/hysteroscopy/polypectomy    HYSTEROSCOPY  05/2008    hysteroscopy/polypectomy    HYSTEROSCOPY  11/02/2009    hysteroscopy/D&C/endometrial ablation    ZZC NONSPECIFIC PROCEDURE  1993    Tonsillectomy     Current Outpatient Medications   Medication Sig Dispense Refill    acyclovir (ZOVIRAX) 800 MG tablet Take 1 tablet (800 mg) by mouth every 8 hours With each cold sore outbreak. 9 tablet 3    Calcium  600 MG tablet Take 1 tablet by mouth daily.      Cholecalciferol (VITAMIN D-3) 5000 units TABS Take 1 tablet by mouth daily 90 tablet 3    estradiol (ESTRACE) 0.1 MG/GM vaginal cream Place 2 g vaginally three times a week 72 g 1    Ibuprofen 200 MG capsule Take 200-600 mg by mouth every 4 hours as needed for fever. 100 capsule 3    MULTIVITAMIN TABS   OR 2 tabs daily      Omega-3 Fatty Acids (OMEGA-3 FISH OIL PO) Take 1,200 mg by mouth daily       scopolamine (TRANSDERM) 1 MG/3DAYS 72 hr patch Apply 1 patch to hairless area behind one ear at least 4 hours before travel.  Remove old patch and change every 3 days (72 hours). 5 patch 3    vitamin C (ASCORBIC ACID) 1000 MG TABS Take 1,000 mg by mouth 2 times daily         Allergies   Allergen Reactions    Eggs [Chicken-Derived Products (Egg)] Nausea    Sulfa Antibiotics Hives        Social History     Tobacco Use    Smoking status: Never    Smokeless tobacco: Never   Substance Use Topics    Alcohol use: Yes     Comment: 1-2 per week       History   Drug Use No         Objective     LMP  (LMP Unknown)     Physical Exam    GENERAL APPEARANCE: healthy, alert and no distress     EYES: EOMI, PERRL     HENT: ear canals and TM's normal and nose and mouth without ulcers or lesions     NECK: no adenopathy, no asymmetry, masses, or scars and thyroid normal to palpation     RESP: lungs clear to auscultation - no rales, rhonchi or wheezes     CV: regular rates and rhythm, normal S1 S2, no S3 or S4 and no murmur, click or rub     ABDOMEN:  soft, nontender, no HSM or masses and bowel sounds normal     MS: extremities normal- no gross deformities noted, no evidence of inflammation in joints, FROM in all extremities.     SKIN: no suspicious lesions or rashes     NEURO: Normal strength and tone, sensory exam grossly normal, mentation intact and speech normal     PSYCH: mentation appears normal. and affect normal/bright     LYMPHATICS: No cervical adenopathy    Recent Labs   Lab Test  12/08/22  0935 12/07/21  0945   HGB 12.8 13.0    342    136   POTASSIUM 4.7 4.0   CR 0.58 0.59   A1C  --  5.5        Diagnostics:  Labs pending at this time.  Results will be reviewed when available.   No EKG required, no history of coronary heart disease, significant arrhythmia, peripheral arterial disease or other structural heart disease.    Revised Cardiac Risk Index (RCRI):  The patient has the following serious cardiovascular risks for perioperative complications:   - No serious cardiac risks = 0 points     RCRI Interpretation: 0 points: Class I (very low risk - 0.4% complication rate)         Signed Electronically by: RONY Jones CNP  Copy of this evaluation report is provided to requesting physician.

## 2023-10-19 NOTE — LETTER
10/19/2023        RE: Kary Perry  [address] [facility] [address]        [address]  [address]  [place] [address]  Phone: [phone]  Fax: [phone]  Primary Provider: Alysha Marsh  Pre-op Performing Provider: ALYSHA MARSH      PREOPERATIVE EVALUATION:  Today's date: 10/19/2023    Radha is a 53 year old female who presents for a preoperative evaluation.      Surgical Information:  Surgery/Procedure: Laparoscopic Cholecystectomy  Surgery Location: [facility]  Surgeon: Andressa Schroeder MD  Surgery Date: 11/1/2023  Time of Surgery: 10:45am  Where patient plans to recover: At home with family  Fax number for surgical facility: Note does not need to be faxed, will be available electronically in Epic.    Assessment & Plan    The proposed surgical procedure is considered INTERMEDIATE risk.    Preop general physical exam  Reviewed medical/social/family history and health maintenance     Biliary colic  Plan for routine Lap Jessica            - No identified additional risk factors other than previously addressed    Antiplatelet or Anticoagulation Medication Instructions:   - Patient is on no antiplatelet or anticoagulation medications.    Additional Medication Instructions:  Patient is on no additional chronic medications    RECOMMENDATION:  APPROVAL GIVEN to proceed with proposed procedure, without further diagnostic evaluation.            Subjective      HPI related to upcoming procedure: Lap Jessica          10/19/2023     9:04 AM   Preop Questions   1. Have you ever had a heart attack or stroke? No   2. Have you ever had surgery on your heart or blood vessels, such as a stent placement, a coronary artery bypass, or surgery on an artery in your head, neck, heart, or legs? No   3. Do you have chest pain with activity? No   4. Do you have a history of  heart failure? No   5. Do you currently have a cold, bronchitis or symptoms of  other infection? No   6. Do you have a cough, shortness of breath, or wheezing? No   7. Do you or anyone in your family have previous history of blood clots? No   8. Do you or does anyone in your family have a serious bleeding problem such as prolonged bleeding following surgeries or cuts? No   9. Have you ever had problems with anemia or been told to take iron pills? No   10. Have you had any abnormal blood loss such as black, tarry or bloody stools, or abnormal vaginal bleeding? No   11. Have you ever had a blood transfusion? No   12. Are you willing to have a blood transfusion if it is medically needed before, during, or after your surgery? Yes   13. Have you or any of your relatives ever had problems with anesthesia? No   14. Do you have sleep apnea, excessive snoring or daytime drowsiness? No   15. Do you have any artifical heart valves or other implanted medical devices like a pacemaker, defibrillator, or continuous glucose monitor? No   16. Do you have artificial joints? No   17. Are you allergic to latex? No   18. Is there any chance that you may be pregnant? No     Health Care Directive:  Patient has a Health Care Directive on file      Preoperative Review of :   reviewed - no record of controlled substances prescribed.          Review of Systems  CONSTITUTIONAL: NEGATIVE for fever, chills, change in weight  INTEGUMENTARY/SKIN: NEGATIVE for worrisome rashes, moles or lesions  EYES: NEGATIVE for vision changes or irritation  ENT/MOUTH: NEGATIVE for ear, mouth and throat problems  RESP: NEGATIVE for significant cough or SOB  CV: NEGATIVE for chest pain, palpitations or peripheral edema  GI: NEGATIVE for nausea, abdominal pain, heartburn, or change in bowel habits  : NEGATIVE for frequency, dysuria, or hematuria  MUSCULOSKELETAL: NEGATIVE for significant arthralgias or myalgia  NEURO: NEGATIVE for weakness, dizziness or paresthesias  ENDOCRINE: NEGATIVE for temperature intolerance, skin/hair  changes  HEME: NEGATIVE for bleeding problems  PSYCHIATRIC: NEGATIVE for changes in mood or affect    Patient Active Problem List    Diagnosis Date Noted    History of ovarian cyst 11/18/2014     Priority: Medium     Problem list name updated by automated process. Provider to review      Neck sprain 05/12/2011     Priority: Medium    Dysphagia 02/03/2011     Priority: Medium    Vitamin D deficiency 02/03/2011     Priority: Medium    CARDIOVASCULAR SCREENING; LDL GOAL LESS THAN 160 10/31/2010     Priority: Medium    DUB (dysfunctional uterine bleeding) 05/12/2008     Priority: Medium     Endometrial ablation 2008      Herpes simplex virus (HSV) infection 09/29/2004     Priority: Medium     Problem list name updated by automated process. Provider to review      Undiagnosed cardiac murmurs 03/11/2004     Priority: Medium    Diffuse cystic mastopathy 03/11/2004     Priority: Medium     1991 had mammogram - left breast more dense        Past Medical History:   Diagnosis Date    Diffuse cystic mastopathy     1991 had mammogram - left breast more dense    Family history of blood disorder     related to a transfusion in her paternal grandmother    FH: non-Hodgkin's lymphoma     Mother 2009    Herpes simplex without mention of complication     herpes labialis    Infectious mononucleosis 1987    Other specified temporomandibular joint disorders     Undiagnosed cardiac murmurs     innocent murmur     Past Surgical History:   Procedure Laterality Date    BREAST SURGERY  3/2020    Breast reduction    COLONOSCOPY  1/2018    ENT SURGERY  3/1994    Tonsillectomy    GYN SURGERY  2009    Ablation    HYSTEROSCOPY  10/2004    D&C/hysteroscopy/polypectomy    HYSTEROSCOPY  05/2008    hysteroscopy/polypectomy    HYSTEROSCOPY  11/02/2009    hysteroscopy/D&C/endometrial ablation    ZZC NONSPECIFIC PROCEDURE  1993    Tonsillectomy     Current Outpatient Medications   Medication Sig Dispense Refill    acyclovir (ZOVIRAX) 800 MG tablet Take 1  tablet (800 mg) by mouth every 8 hours With each cold sore outbreak. 9 tablet 3    Calcium 600 MG tablet Take 1 tablet by mouth daily.      Cholecalciferol (VITAMIN D-3) 5000 units TABS Take 1 tablet by mouth daily 90 tablet 3    estradiol (ESTRACE) 0.1 MG/GM vaginal cream Place 2 g vaginally three times a week 72 g 1    Ibuprofen 200 MG capsule Take 200-600 mg by mouth every 4 hours as needed for fever. 100 capsule 3    MULTIVITAMIN TABS   OR 2 tabs daily      Omega-3 Fatty Acids (OMEGA-3 FISH OIL PO) Take 1,200 mg by mouth daily       scopolamine (TRANSDERM) 1 MG/3DAYS 72 hr patch Apply 1 patch to hairless area behind one ear at least 4 hours before travel.  Remove old patch and change every 3 days (72 hours). 5 patch 3    vitamin C (ASCORBIC ACID) 1000 MG TABS Take 1,000 mg by mouth 2 times daily         Allergies   Allergen Reactions    Eggs [Chicken-Derived Products (Egg)] Nausea    Sulfa Antibiotics Hives        Social History     Tobacco Use    Smoking status: Never    Smokeless tobacco: Never   Substance Use Topics    Alcohol use: Yes     Comment: 1-2 per week       History   Drug Use No         Objective    LMP  (LMP Unknown)     Physical Exam    GENERAL APPEARANCE: healthy, alert and no distress     EYES: EOMI, PERRL     HENT: ear canals and TM's normal and nose and mouth without ulcers or lesions     NECK: no adenopathy, no asymmetry, masses, or scars and thyroid normal to palpation     RESP: lungs clear to auscultation - no rales, rhonchi or wheezes     CV: regular rates and rhythm, normal S1 S2, no S3 or S4 and no murmur, click or rub     ABDOMEN:  soft, nontender, no HSM or masses and bowel sounds normal     MS: extremities normal- no gross deformities noted, no evidence of inflammation in joints, FROM in all extremities.     SKIN: no suspicious lesions or rashes     NEURO: Normal strength and tone, sensory exam grossly normal, mentation intact and speech normal     PSYCH: mentation appears normal. and  affect normal/bright     LYMPHATICS: No cervical adenopathy    Recent Labs   Lab Test 12/08/22  0935 12/07/21  0945   HGB 12.8 13.0    342    136   POTASSIUM 4.7 4.0   CR 0.58 0.59   A1C  --  5.5        Diagnostics:  Labs pending at this time.  Results will be reviewed when available.   No EKG required, no history of coronary heart disease, significant arrhythmia, peripheral arterial disease or other structural heart disease.    Revised Cardiac Risk Index (RCRI):  The patient has the following serious cardiovascular risks for perioperative complications:   - No serious cardiac risks = 0 points     RCRI Interpretation: 0 points: Class I (very low risk - 0.4% complication rate)         Signed Electronically by: RONY Jones CNP  Copy of this evaluation report is provided to requesting physician.          Sincerely,        RONY Jones CNP

## 2023-10-29 ENCOUNTER — MYC MEDICAL ADVICE (OUTPATIENT)
Dept: FAMILY MEDICINE | Facility: CLINIC | Age: 53
End: 2023-10-29
Payer: COMMERCIAL

## 2023-10-30 DIAGNOSIS — N95.2 VAGINAL ATROPHY: ICD-10-CM

## 2023-10-30 RX ORDER — ESTRADIOL 0.1 MG/G
2 CREAM VAGINAL
Qty: 42.5 G | Refills: 3 | Status: SHIPPED | OUTPATIENT
Start: 2023-10-30

## 2023-10-31 ENCOUNTER — ANESTHESIA EVENT (OUTPATIENT)
Dept: SURGERY | Facility: CLINIC | Age: 53
End: 2023-10-31
Payer: COMMERCIAL

## 2023-11-01 ENCOUNTER — ANESTHESIA (OUTPATIENT)
Dept: SURGERY | Facility: CLINIC | Age: 53
End: 2023-11-01
Payer: COMMERCIAL

## 2023-11-01 ENCOUNTER — HOSPITAL ENCOUNTER (OUTPATIENT)
Facility: CLINIC | Age: 53
Discharge: HOME | End: 2023-11-01
Attending: SURGERY | Admitting: SURGERY
Payer: COMMERCIAL

## 2023-11-01 ENCOUNTER — APPOINTMENT (OUTPATIENT)
Dept: SURGERY | Facility: PHYSICIAN GROUP | Age: 53
End: 2023-11-01
Payer: COMMERCIAL

## 2023-11-01 VITALS
RESPIRATION RATE: 16 BRPM | OXYGEN SATURATION: 98 % | SYSTOLIC BLOOD PRESSURE: 141 MMHG | HEART RATE: 73 BPM | DIASTOLIC BLOOD PRESSURE: 80 MMHG | TEMPERATURE: 97.6 F | BODY MASS INDEX: 27.88 KG/M2 | HEIGHT: 64 IN | WEIGHT: 163.3 LBS

## 2023-11-01 DIAGNOSIS — G89.18 POSTOPERATIVE PAIN: Primary | ICD-10-CM

## 2023-11-01 PROCEDURE — 258N000003 HC RX IP 258 OP 636: Performed by: NURSE ANESTHETIST, CERTIFIED REGISTERED

## 2023-11-01 PROCEDURE — 250N000011 HC RX IP 250 OP 636: Performed by: ANESTHESIOLOGY

## 2023-11-01 PROCEDURE — 710N000012 HC RECOVERY PHASE 2, PER MINUTE: Performed by: SURGERY

## 2023-11-01 PROCEDURE — 360N000076 HC SURGERY LEVEL 3, PER MIN: Performed by: SURGERY

## 2023-11-01 PROCEDURE — 370N000017 HC ANESTHESIA TECHNICAL FEE, PER MIN: Performed by: SURGERY

## 2023-11-01 PROCEDURE — 47562 LAPAROSCOPIC CHOLECYSTECTOMY: CPT | Performed by: SURGERY

## 2023-11-01 PROCEDURE — 250N000009 HC RX 250: Performed by: SURGERY

## 2023-11-01 PROCEDURE — 272N000001 HC OR GENERAL SUPPLY STERILE: Performed by: SURGERY

## 2023-11-01 PROCEDURE — 250N000009 HC RX 250: Performed by: NURSE ANESTHETIST, CERTIFIED REGISTERED

## 2023-11-01 PROCEDURE — 47562 LAPAROSCOPIC CHOLECYSTECTOMY: CPT | Mod: AS | Performed by: PHYSICIAN ASSISTANT

## 2023-11-01 PROCEDURE — 250N000011 HC RX IP 250 OP 636: Performed by: SURGERY

## 2023-11-01 PROCEDURE — 999N000141 HC STATISTIC PRE-PROCEDURE NURSING ASSESSMENT: Performed by: SURGERY

## 2023-11-01 PROCEDURE — 710N000009 HC RECOVERY PHASE 1, LEVEL 1, PER MIN: Performed by: SURGERY

## 2023-11-01 PROCEDURE — 250N000011 HC RX IP 250 OP 636: Mod: JZ | Performed by: NURSE ANESTHETIST, CERTIFIED REGISTERED

## 2023-11-01 PROCEDURE — 88304 TISSUE EXAM BY PATHOLOGIST: CPT | Mod: 26 | Performed by: PATHOLOGY

## 2023-11-01 PROCEDURE — 250N000025 HC SEVOFLURANE, PER MIN: Performed by: SURGERY

## 2023-11-01 PROCEDURE — 250N000013 HC RX MED GY IP 250 OP 250 PS 637: Performed by: PHYSICIAN ASSISTANT

## 2023-11-01 PROCEDURE — 88304 TISSUE EXAM BY PATHOLOGIST: CPT | Mod: TC | Performed by: SURGERY

## 2023-11-01 PROCEDURE — 250N000011 HC RX IP 250 OP 636: Performed by: NURSE ANESTHETIST, CERTIFIED REGISTERED

## 2023-11-01 RX ORDER — BUPIVACAINE HYDROCHLORIDE AND EPINEPHRINE 5; 5 MG/ML; UG/ML
INJECTION, SOLUTION EPIDURAL; INTRACAUDAL; PERINEURAL
Status: DISCONTINUED
Start: 2023-11-01 | End: 2023-11-01 | Stop reason: HOSPADM

## 2023-11-01 RX ORDER — DEXAMETHASONE SODIUM PHOSPHATE 4 MG/ML
INJECTION, SOLUTION INTRA-ARTICULAR; INTRALESIONAL; INTRAMUSCULAR; INTRAVENOUS; SOFT TISSUE PRN
Status: DISCONTINUED | OUTPATIENT
Start: 2023-11-01 | End: 2023-11-01

## 2023-11-01 RX ORDER — HYDROMORPHONE HYDROCHLORIDE 1 MG/ML
INJECTION, SOLUTION INTRAMUSCULAR; INTRAVENOUS; SUBCUTANEOUS PRN
Status: DISCONTINUED | OUTPATIENT
Start: 2023-11-01 | End: 2023-11-01

## 2023-11-01 RX ORDER — MAGNESIUM HYDROXIDE 1200 MG/15ML
LIQUID ORAL PRN
Status: DISCONTINUED | OUTPATIENT
Start: 2023-11-01 | End: 2023-11-01 | Stop reason: HOSPADM

## 2023-11-01 RX ORDER — PROPOFOL 10 MG/ML
INJECTION, EMULSION INTRAVENOUS PRN
Status: DISCONTINUED | OUTPATIENT
Start: 2023-11-01 | End: 2023-11-01

## 2023-11-01 RX ORDER — FENTANYL CITRATE 0.05 MG/ML
25 INJECTION, SOLUTION INTRAMUSCULAR; INTRAVENOUS EVERY 5 MIN PRN
Status: DISCONTINUED | OUTPATIENT
Start: 2023-11-01 | End: 2023-11-01 | Stop reason: HOSPADM

## 2023-11-01 RX ORDER — OXYCODONE HYDROCHLORIDE 5 MG/1
5-10 TABLET ORAL
Status: COMPLETED | OUTPATIENT
Start: 2023-11-01 | End: 2023-11-01

## 2023-11-01 RX ORDER — ONDANSETRON 2 MG/ML
4 INJECTION INTRAMUSCULAR; INTRAVENOUS EVERY 30 MIN PRN
Status: DISCONTINUED | OUTPATIENT
Start: 2023-11-01 | End: 2023-11-01 | Stop reason: HOSPADM

## 2023-11-01 RX ORDER — HYDROMORPHONE HCL IN WATER/PF 6 MG/30 ML
0.2 PATIENT CONTROLLED ANALGESIA SYRINGE INTRAVENOUS EVERY 5 MIN PRN
Status: DISCONTINUED | OUTPATIENT
Start: 2023-11-01 | End: 2023-11-01 | Stop reason: HOSPADM

## 2023-11-01 RX ORDER — ACETAMINOPHEN 325 MG/1
325-650 TABLET ORAL EVERY 6 HOURS PRN
COMMUNITY

## 2023-11-01 RX ORDER — LIDOCAINE HYDROCHLORIDE 20 MG/ML
INJECTION, SOLUTION INFILTRATION; PERINEURAL PRN
Status: DISCONTINUED | OUTPATIENT
Start: 2023-11-01 | End: 2023-11-01

## 2023-11-01 RX ORDER — CEFAZOLIN SODIUM/WATER 2 G/20 ML
2 SYRINGE (ML) INTRAVENOUS SEE ADMIN INSTRUCTIONS
Status: DISCONTINUED | OUTPATIENT
Start: 2023-11-01 | End: 2023-11-01 | Stop reason: HOSPADM

## 2023-11-01 RX ORDER — HYDROMORPHONE HCL IN WATER/PF 6 MG/30 ML
0.4 PATIENT CONTROLLED ANALGESIA SYRINGE INTRAVENOUS EVERY 5 MIN PRN
Status: DISCONTINUED | OUTPATIENT
Start: 2023-11-01 | End: 2023-11-01 | Stop reason: HOSPADM

## 2023-11-01 RX ORDER — PROPOFOL 10 MG/ML
INJECTION, EMULSION INTRAVENOUS CONTINUOUS PRN
Status: DISCONTINUED | OUTPATIENT
Start: 2023-11-01 | End: 2023-11-01

## 2023-11-01 RX ORDER — ONDANSETRON 4 MG/1
4 TABLET, ORALLY DISINTEGRATING ORAL EVERY 30 MIN PRN
Status: DISCONTINUED | OUTPATIENT
Start: 2023-11-01 | End: 2023-11-01 | Stop reason: HOSPADM

## 2023-11-01 RX ORDER — AMOXICILLIN 250 MG
1-2 CAPSULE ORAL 2 TIMES DAILY PRN
Qty: 15 TABLET | Refills: 0 | Status: SHIPPED | OUTPATIENT
Start: 2023-11-01 | End: 2024-02-13

## 2023-11-01 RX ORDER — OXYCODONE HYDROCHLORIDE 5 MG/1
5-10 TABLET ORAL EVERY 6 HOURS PRN
Qty: 8 TABLET | Refills: 0 | Status: SHIPPED | OUTPATIENT
Start: 2023-11-01 | End: 2024-02-13

## 2023-11-01 RX ORDER — FENTANYL CITRATE 50 UG/ML
INJECTION, SOLUTION INTRAMUSCULAR; INTRAVENOUS PRN
Status: DISCONTINUED | OUTPATIENT
Start: 2023-11-01 | End: 2023-11-01

## 2023-11-01 RX ORDER — CEFAZOLIN SODIUM/WATER 2 G/20 ML
2 SYRINGE (ML) INTRAVENOUS
Status: COMPLETED | OUTPATIENT
Start: 2023-11-01 | End: 2023-11-01

## 2023-11-01 RX ORDER — SODIUM CHLORIDE, SODIUM LACTATE, POTASSIUM CHLORIDE, CALCIUM CHLORIDE 600; 310; 30; 20 MG/100ML; MG/100ML; MG/100ML; MG/100ML
INJECTION, SOLUTION INTRAVENOUS CONTINUOUS
Status: DISCONTINUED | OUTPATIENT
Start: 2023-11-01 | End: 2023-11-01 | Stop reason: HOSPADM

## 2023-11-01 RX ORDER — LIDOCAINE HYDROCHLORIDE 10 MG/ML
INJECTION, SOLUTION EPIDURAL; INFILTRATION; INTRACAUDAL; PERINEURAL
Status: DISCONTINUED
Start: 2023-11-01 | End: 2023-11-01 | Stop reason: HOSPADM

## 2023-11-01 RX ORDER — FENTANYL CITRATE 0.05 MG/ML
50 INJECTION, SOLUTION INTRAMUSCULAR; INTRAVENOUS EVERY 5 MIN PRN
Status: DISCONTINUED | OUTPATIENT
Start: 2023-11-01 | End: 2023-11-01 | Stop reason: HOSPADM

## 2023-11-01 RX ORDER — KETOROLAC TROMETHAMINE 30 MG/ML
INJECTION, SOLUTION INTRAMUSCULAR; INTRAVENOUS PRN
Status: DISCONTINUED | OUTPATIENT
Start: 2023-11-01 | End: 2023-11-01

## 2023-11-01 RX ORDER — SODIUM CHLORIDE, SODIUM LACTATE, POTASSIUM CHLORIDE, CALCIUM CHLORIDE 600; 310; 30; 20 MG/100ML; MG/100ML; MG/100ML; MG/100ML
INJECTION, SOLUTION INTRAVENOUS CONTINUOUS PRN
Status: DISCONTINUED | OUTPATIENT
Start: 2023-11-01 | End: 2023-11-01

## 2023-11-01 RX ORDER — ONDANSETRON 2 MG/ML
INJECTION INTRAMUSCULAR; INTRAVENOUS PRN
Status: DISCONTINUED | OUTPATIENT
Start: 2023-11-01 | End: 2023-11-01

## 2023-11-01 RX ADMIN — HYDROMORPHONE HYDROCHLORIDE 0.5 MG: 1 INJECTION, SOLUTION INTRAMUSCULAR; INTRAVENOUS; SUBCUTANEOUS at 10:51

## 2023-11-01 RX ADMIN — SODIUM CHLORIDE, POTASSIUM CHLORIDE, SODIUM LACTATE AND CALCIUM CHLORIDE: 600; 310; 30; 20 INJECTION, SOLUTION INTRAVENOUS at 10:17

## 2023-11-01 RX ADMIN — LIDOCAINE HYDROCHLORIDE 80 MG: 20 INJECTION, SOLUTION INFILTRATION; PERINEURAL at 10:22

## 2023-11-01 RX ADMIN — ROCURONIUM BROMIDE 30 MG: 50 INJECTION, SOLUTION INTRAVENOUS at 10:35

## 2023-11-01 RX ADMIN — SUCCINYLCHOLINE CHLORIDE 160 MG: 20 INJECTION, SOLUTION INTRAMUSCULAR; INTRAVENOUS; PARENTERAL at 10:22

## 2023-11-01 RX ADMIN — FENTANYL CITRATE 50 MCG: 50 INJECTION INTRAMUSCULAR; INTRAVENOUS at 10:22

## 2023-11-01 RX ADMIN — Medication 2 G: at 10:11

## 2023-11-01 RX ADMIN — ONDANSETRON 4 MG: 2 INJECTION INTRAMUSCULAR; INTRAVENOUS at 10:33

## 2023-11-01 RX ADMIN — FENTANYL CITRATE 50 MCG: 50 INJECTION INTRAMUSCULAR; INTRAVENOUS at 10:17

## 2023-11-01 RX ADMIN — KETOROLAC TROMETHAMINE 15 MG: 30 INJECTION, SOLUTION INTRAMUSCULAR at 11:10

## 2023-11-01 RX ADMIN — PROPOFOL 25 MCG/KG/MIN: 10 INJECTION, EMULSION INTRAVENOUS at 10:22

## 2023-11-01 RX ADMIN — PROPOFOL 200 MG: 10 INJECTION, EMULSION INTRAVENOUS at 10:22

## 2023-11-01 RX ADMIN — ONDANSETRON 4 MG: 2 INJECTION INTRAMUSCULAR; INTRAVENOUS at 11:39

## 2023-11-01 RX ADMIN — SUGAMMADEX 200 MG: 100 INJECTION, SOLUTION INTRAVENOUS at 11:20

## 2023-11-01 RX ADMIN — DEXAMETHASONE SODIUM PHOSPHATE 4 MG: 4 INJECTION, SOLUTION INTRA-ARTICULAR; INTRALESIONAL; INTRAMUSCULAR; INTRAVENOUS; SOFT TISSUE at 10:33

## 2023-11-01 RX ADMIN — FENTANYL CITRATE 50 MCG: 50 INJECTION, SOLUTION INTRAMUSCULAR; INTRAVENOUS at 11:39

## 2023-11-01 RX ADMIN — MIDAZOLAM 2 MG: 1 INJECTION INTRAMUSCULAR; INTRAVENOUS at 10:17

## 2023-11-01 RX ADMIN — OXYCODONE HYDROCHLORIDE 5 MG: 5 TABLET ORAL at 12:14

## 2023-11-01 ASSESSMENT — LIFESTYLE VARIABLES: TOBACCO_USE: 0

## 2023-11-01 ASSESSMENT — ACTIVITIES OF DAILY LIVING (ADL)
ADLS_ACUITY_SCORE: 35
ADLS_ACUITY_SCORE: 33
ADLS_ACUITY_SCORE: 35

## 2023-11-01 ASSESSMENT — ENCOUNTER SYMPTOMS: SEIZURES: 0

## 2023-11-01 NOTE — OP NOTE
General Surgery Operative Note    PREOPERATIVE DIAGNOSIS:  symptomatic cholelithiasis.    POSTOPERATIVE DIAGNOSIS:  chronic cholecystitis    PROCEDURE:  Laparoscopic Cholecystectomy    SURGEON:  Emma Schroeder MD    ASSISTANT:  Leatha Lainez PA-C  The physician s assistant was medically necessary for their expertise in camera management, suctioning and retraction.  ANESTHESIA:  General.    BLOOD LOSS: 5ml    FINDINGS: inflammation with omental adhesions to gallbladder consistent with chronic cholecystitis    INDICATIONS:   Mrs. Perry is a 54 yo female with symptomatic cholelithiasis and biliary sludge.    I explained the risks, benefits, complications for laparoscopic cholecystectomy including but not limited to bleeding, infection, possible need to open, possible postop hematoma, seroma, bowel, bladder or bile duct injury, MI, PE, and if any of these occurred the patient would require additional procedures. The patient agreed and did sign consent.    DETAILS OF PROCEDURE:   The patient was brought to the operating room. They were positioned on the operating room table with the left arm tucked at their side. General anesthesia was induced. Perioperative antibiotics were administered. The abdomen was prepped and draped in standard fashion.    A small skin incision was made in the left upper quadrant. A 5mm 0degree laparoscope was used with a visiport to obtain access to the abdomen. Insufflation was connected and flowed freely. Insufflation pressure was sent to 15mmhg. The abdomen was surveyed and revealed no acute findings.  There was no injury from abdominal entrance noted. A 12mm port was placed in the midline just beneath the umbilicus. A 5mm 30 degree laparoscope was inserted and revealed no trocar injuries. Local was injected to the upper abdomen and two 5mm ports were placed in the right upper quadrant under direct visualization. The gallbladder was retracted superiorly and laterally. The gallbladder  wall was mildly edematous. There were omental adhesions present to the free wall of the gallbladder. These were carefully taken down with a combination of cautery and laparoscopic scissors near the duodenum. Cautery was used to take down the medial and lateral peritoneal attachments. I was able to delineate the cystic artery and cystic duct and got under the undersurface of the gallbladder to help further declinate the duct and artery. This dissection was taken up high along the gallbladder to confirm the critical view on multiple views. Two clips were placed proximally on the cystic duct and one distally. Two clips were placed proximally on the artery and one clip distally. Both the cystic artery and cystic duct were then completely transected with laparoscopic scissors. I elected to further secure the cystic duct with an 0 PDS endoloop given the duct was 5mm in size.     The gallbladder was taken off the liver bed with cautery. There was no bile spillage or significant bleeding. The gallbladder was then placed in an Endocatch bag and removed through the umbilical trocar site. The camera was reinserted which showed no bleeding or bile spillage. Copious irrigation was used until clear. The wound bed was inspected multiple times showing that it was completely dry and that the clips were in place. The omentum was packed into the perihepatic fossa. The 12mm port fascia closed with 0 Vicryl in figure-of-eight fashion using the jony jan device. All gas was expelled and the trocars were taken out under direct visualization. Marcaine 0.5% were injected to all the wounds. The skin was closed with a 4-0 Monocryl in a subcuticular fashion. Steri-strips and sterile dressings were applied. The patient tolerated the procedure well. There were no complications. They were awoken from anesthesia and transferred to PACU in stable condition. All sponge, instrument and needle counts were correct.       BYRON SHEPARD,  MD    Please route or send letter to:  Primary Care Provider (PCP) and Referring Provider

## 2023-11-01 NOTE — INTERVAL H&P NOTE
"I have reviewed the surgical (or preoperative) H&P that is linked to this encounter, and examined the patient. There are no significant changes    Clinical Conditions Present on Arrival:  Clinically Significant Risk Factors Present on Admission                  # Overweight: Estimated body mass index is 26.38 kg/m  as calculated from the following:    Height as of 10/19/23: 1.664 m (5' 5.5\").    Weight as of 10/19/23: 73 kg (161 lb).       "

## 2023-11-01 NOTE — ANESTHESIA PREPROCEDURE EVALUATION
Anesthesia Pre-Procedure Evaluation    Patient: Kary Perry   MRN: [id number] : 1970        Procedure : Procedure(s):  CHOLECYSTECTOMY, LAPAROSCOPIC          Past Medical History:   Diagnosis Date    Diffuse cystic mastopathy      had mammogram - left breast more dense    Family history of blood disorder     related to a transfusion in her paternal grandmother    FH: non-Hodgkin's lymphoma     Mother 2009    Herpes simplex without mention of complication     herpes labialis    Infectious mononucleosis 1987    Other specified temporomandibular joint disorders     Undiagnosed cardiac murmurs     innocent murmur      Past Surgical History:   Procedure Laterality Date    BREAST SURGERY  3/2020    Breast reduction    COLONOSCOPY  2018    ENT SURGERY  3/1994    Tonsillectomy    GYN SURGERY  2009    Ablation    HYSTEROSCOPY  10/2004    D&C/hysteroscopy/polypectomy    HYSTEROSCOPY  2008    hysteroscopy/polypectomy    HYSTEROSCOPY  2009    hysteroscopy/D&C/endometrial ablation    ZZC NONSPECIFIC PROCEDURE      Tonsillectomy      Allergies   Allergen Reactions    Eggs [Chicken-Derived Products (Egg)] Nausea    Sulfa Antibiotics Hives      Social History     Tobacco Use    Smoking status: Never    Smokeless tobacco: Never   Substance Use Topics    Alcohol use: Yes     Comment: 1-2 per week      Wt Readings from Last 1 Encounters:   10/19/23 73 kg (161 lb)        Anesthesia Evaluation   Pt has had prior anesthetic.     No history of anesthetic complications       ROS/MED HX  ENT/Pulmonary:    (-) tobacco use, asthma and sleep apnea   Neurologic:    (-) no seizures and migraines   Cardiovascular:     (+)  - -   -  - -                           valvular problems/murmurs (undiagnosed cardiac murmur, [facility])           METS/Exercise Tolerance:     Hematologic:       Musculoskeletal:       GI/Hepatic:    (-) GERD   Renal/Genitourinary:       Endo:       Psychiatric/Substance Use:       Infectious  "Disease:       Malignancy:       Other:            Physical Exam    Airway        Mallampati: II   TM distance: > 3 FB   Neck ROM: full   Mouth opening: > 3 cm    Respiratory Devices and Support         Dental       (+) Minor Abnormalities - some fillings, tiny chips      Cardiovascular   cardiovascular exam normal          Pulmonary   pulmonary exam normal                OUTSIDE LABS:  CBC:   Lab Results   Component Value Date    WBC 7.3 10/19/2023    WBC 8.0 12/08/2022    HGB 12.6 10/19/2023    HGB 12.8 12/08/2022    HCT 38.4 10/19/2023    HCT 38.8 12/08/2022     10/19/2023     12/08/2022     BMP:   Lab Results   Component Value Date     10/19/2023     12/08/2022    POTASSIUM 4.2 10/19/2023    POTASSIUM 4.7 12/08/2022    CHLORIDE 103 10/19/2023    CHLORIDE 105 12/08/2022    CO2 24 10/19/2023    CO2 25 12/08/2022    BUN 12.3 10/19/2023    BUN 11.6 12/08/2022    CR 0.56 10/19/2023    CR 0.58 12/08/2022    GLC 85 10/19/2023     (H) 12/08/2022     COAGS: No results found for: \"PTT\", \"INR\", \"FIBR\"  POC:   Lab Results   Component Value Date    HCG Negative 03/27/2014     HEPATIC:   Lab Results   Component Value Date    ALBUMIN 4.4 10/19/2023    PROTTOTAL 7.0 10/19/2023    ALT 19 10/19/2023    AST 36 10/19/2023    ALKPHOS 69 10/19/2023    BILITOTAL 0.5 10/19/2023     OTHER:   Lab Results   Component Value Date    A1C 5.5 12/07/2021    ARCADIO 9.8 10/19/2023    TSH 2.46 12/08/2022    T4 1.02 02/25/2010    CRP 0.5 02/25/2010    SED 7 07/30/2010       Anesthesia Plan    ASA Status:  2       Anesthesia Type: General.     - Airway: ETT   Induction: Intravenous.   Maintenance: Balanced.        Consents    Anesthesia Plan(s) and associated risks, benefits, and realistic alternatives discussed. Questions answered and patient/representative(s) expressed understanding.     - Discussed:     - Discussed with:  Patient            Postoperative Care    Pain management: IV analgesics, Oral pain medications. "   PONV prophylaxis: Ondansetron (or other 5HT-3), Dexamethasone or Solumedrol, Background Propofol Infusion     Comments:                Latha Spencer

## 2023-11-01 NOTE — DISCHARGE INSTRUCTIONS
[facility]  Discharge Instructions: Post-Operative Cholecystectomy    ACTIVITY  Expect to feel tired after your surgery.  This will gradually resolve.    Take frequent, short walks and increase your activity gradually.    Avoid strenuous physical activity or heavy lifting greater than 15-20 lbs. for 2-3 weeks.  You may climb stairs.  You may drive without restrictions when you are not using any prescription pain medication and feel comfortable in a car.  You may return to work/school when you are comfortable without any prescription pain medication.    WOUND CARE  You may remove your outer dressing or Band-Aids and shower 48 hours after the surgery.  Pat your incisions dry and leave them open to air.  Re-apply dressing (Band-Aids or gauze/tape) as needed for comfort or drainage.  You may have steri-strips (looks like white tape) on your incision.  You may peel off the steri-strips 2 weeks after your surgery if they have not peeled off on their own.  If you have skin glue, it will peel up and fall off on its own.  Do not soak your incisions in a tub or pool for 2 weeks.   Do not apply any lotions, creams, or ointments to your incisions.  A ridge under your incisions is normal and will gradually resolve.    DIET  Start with liquids, then gradually resume your regular diet as tolerated.  Avoid heavy, spicy, and greasy meals for 2-3 days.  Drink plenty of fluids to stay hydrated.  It is not uncommon to experience some loose stools or diarrhea after surgery.  This is your body's way of adapting to the bile which will slowly drain into your intestine.  A low fat diet may help with this.  This should improve over 1-2 months.    PAIN  Expect some tenderness and discomfort at the incision sites.  Use the prescribed pain medication at your discretion.  Expect gradual resolution of your pain over several days.  You may take ibuprofen with food (unless you have been told not to) or  acetaminophen/Tylenol instead of or in addition to your prescribed pain medication.   Do not drink alcohol or drive while you are taking pain medications.  You may apply ice to your incisions in 20 minute intervals as needed for the next 48 hours.  After that time, consider switching to heat if you prefer.    EXPECTATIONS  Pain medications can cause constipation.  Limit use when possible.  Take an over the counter or prescribed stool softener/stimulant, such as Colace or Senna, 1-2 times a day with plenty of water.  You may take a mild over the counter laxative, such as Miralax or a suppository, as needed.  You may discontinue these medications once you are having regular bowel movements and/or are no longer taking your narcotic pain medication.    You may have shoulder or upper back discomfort due to the gas used in surgery.  This is temporary and should resolve in 48-72 hours.  Short, frequent walks may help with this.  If you are unable to urinate for 8 hours or feel as though you are not emptying your bladder adequately, we recommend you seek care at an ER or Urgent Care facility for possible catheter placement.     FOLLOW UP  Our office will contact you in approximately 2-3 weeks to check on your progress and answer any questions you may have.  If you are doing well, you will not need to return for a follow up appointment.  If any concerns are identified over the phone, we will help you make an appointment to see a provider.   If you have not received a phone call, have any questions or concerns, or would like to be seen, please call us at [phone] and ask to speak with our nurse.  We are located at [address] [place] [address].    CALL OUR OFFICE -[phone] IF YOU HAVE:   Chills or fever above 101 F.  Increased redness, warmth, or drainage at your incisions.  Significant bleeding.  Pain not relieved by your pain medication or rest.  Increasing pain after the first 48 hours.  Any  other concerns or questions.                            Same Day Surgery Discharge Instructions for  Sedation and General Anesthesia     It's not unusual to feel dizzy, light-headed or faint for up to 24 hours after surgery or while taking pain medication.  If you have these symptoms: sit for a few minutes before standing and have someone assist you when you get up to walk or use the bathroom.    You should rest and relax for the next 24 hours. We recommend you make arrangements to have an adult stay with you for at least 24 hours after your discharge.  Avoid hazardous and strenuous activity.    DO NOT DRIVE any vehicle or operate mechanical equipment for 24 hours following the end of your surgery.  Even though you may feel normal, your reactions may be affected by the medication you have received.    Do not drink alcoholic beverages for 24 hours following surgery.     Slowly progress to your regular diet as you feel able. It's not unusual to feel nauseated and/or vomit after receiving anesthesia.  If you develop these symptoms, drink clear liquids (apple juice, ginger ale, broth, 7-up, etc. ) until you feel better.  If your nausea and vomiting persists for 24 hours, please notify your surgeon.      All narcotic pain medications, along with inactivity and anesthesia, can cause constipation. Drinking plenty of liquids and increasing fiber intake will help.    For any questions of a medical nature, call your surgeon.    Do not make important decisions for 24 hours.    If you had general anesthesia, you may have a sore throat for a couple of days related to the breathing tube used during surgery.  You may use Cepacol lozenges to help with this discomfort.  If it worsens or if you develop a fever, contact your surgeon.     If you feel your pain is not well managed with the pain medications prescribed by your surgeon, please contact your surgeon's office to let them know so they can address your concerns.       **If you  have concerns or questions about your procedure,    please contact Dr Schroeder at  [phone]**

## 2023-11-01 NOTE — ANESTHESIA CARE TRANSFER NOTE
Patient: Kary Perry    Procedure: Procedure(s):  CHOLECYSTECTOMY, LAPAROSCOPIC       Diagnosis: Biliary colic [K80.50]  Diagnosis Additional Information: No value filed.    Anesthesia Type:   General     Note:    Oropharynx: oropharynx clear of all foreign objects  Level of Consciousness: awake  Oxygen Supplementation: face mask  Level of Supplemental Oxygen (L/min / FiO2): 8  Independent Airway: airway patency satisfactory and stable  Dentition: dentition unchanged  Vital Signs Stable: post-procedure vital signs reviewed and stable  Report to RN Given: handoff report given  Patient transferred to: PACU  Comments: Spontaneously breathing with adequate vT, sats 98 - 100%, TOF 4/4 with sustained tetany. Purposeful movement, following commands with 100% O2 at 15 L/min, suctioned x2, Anesthesiologist notified.  Extubated.  To PACU with O2 via SFM at 10 L/minute, VSS. Monitors on, report to RN.  Handoff Report: Identifed the Patient, Identified the Reponsible Provider, Reviewed the pertinent medical history, Discussed the surgical course, Reviewed Intra-OP anesthesia mangement and issues during anesthesia, Set expectations for post-procedure period and Allowed opportunity for questions and acknowledgement of understanding      Vitals:  Vitals Value Taken Time   /79 11/01/23 1130   Temp     Pulse 68 11/01/23 1131   Resp 10 11/01/23 1131   SpO2 100 % 11/01/23 1131   Vitals shown include unfiled device data.    Electronically Signed By: RONY Myers CRNA  November 1, 2023  11:32 AM

## 2023-11-01 NOTE — ANESTHESIA POSTPROCEDURE EVALUATION
Patient: Kary Perry    Procedure: Procedure(s):  CHOLECYSTECTOMY, LAPAROSCOPIC       Anesthesia Type:  General    Note:  Disposition: Inpatient   Postop Pain Control: Uneventful            Sign Out: Well controlled pain   PONV: No   Neuro/Psych: Uneventful            Sign Out: Acceptable/Baseline neuro status   Airway/Respiratory: Uneventful            Sign Out: Acceptable/Baseline resp. status   CV/Hemodynamics: Uneventful            Sign Out: Acceptable CV status; No obvious hypovolemia; No obvious fluid overload   Other NRE: NONE   DID A NON-ROUTINE EVENT OCCUR?            Last vitals:  Vitals Value Taken Time   /77 11/01/23 1215   Temp 36.4  C (97.6  F) 11/01/23 1215   Pulse 64 11/01/23 1223   Resp 13 11/01/23 1223   SpO2 97 % 11/01/23 1223   Vitals shown include unfiled device data.    Electronically Signed By: Latha Spencer  November 1, 2023  1:43 PM

## 2023-11-02 LAB
PATH REPORT.COMMENTS IMP SPEC: NORMAL
PATH REPORT.COMMENTS IMP SPEC: NORMAL
PATH REPORT.FINAL DX SPEC: NORMAL
PATH REPORT.GROSS SPEC: NORMAL
PATH REPORT.MICROSCOPIC SPEC OTHER STN: NORMAL
PATH REPORT.RELEVANT HX SPEC: NORMAL
PHOTO IMAGE: NORMAL

## 2023-11-20 ENCOUNTER — TELEPHONE (OUTPATIENT)
Dept: SURGERY | Facility: CLINIC | Age: 53
End: 2023-11-20
Payer: COMMERCIAL

## 2023-11-20 NOTE — CONFIDENTIAL NOTE
[facility]  Post op call note  November 20, 2023       Kary HEATH Orlando was called for an update regarding her recovery.  There was no answer and a message was left encouraging her to call us back with any questions, concerns, or to provide an update.        Leatha Lainez PA-C  [facility]  [phone]

## 2023-11-24 ENCOUNTER — ANCILLARY PROCEDURE (OUTPATIENT)
Dept: MAMMOGRAPHY | Facility: CLINIC | Age: 53
End: 2023-11-24
Attending: NURSE PRACTITIONER
Payer: COMMERCIAL

## 2023-11-24 DIAGNOSIS — Z12.31 VISIT FOR SCREENING MAMMOGRAM: ICD-10-CM

## 2023-11-24 PROCEDURE — 77067 SCR MAMMO BI INCL CAD: CPT | Mod: TC | Performed by: RADIOLOGY

## 2023-11-24 PROCEDURE — 77063 BREAST TOMOSYNTHESIS BI: CPT | Mod: TC | Performed by: RADIOLOGY

## 2023-11-29 ENCOUNTER — MYC MEDICAL ADVICE (OUTPATIENT)
Dept: FAMILY MEDICINE | Facility: CLINIC | Age: 53
End: 2023-11-29
Payer: COMMERCIAL

## 2023-11-29 DIAGNOSIS — Z78.0 POST-MENOPAUSAL: Primary | ICD-10-CM

## 2023-11-29 DIAGNOSIS — Z13.820 OSTEOPOROSIS SCREENING: ICD-10-CM

## 2023-11-29 DIAGNOSIS — M80.80XA LOCALIZED OSTEOPOROSIS WITH CURRENT PATHOLOGICAL FRACTURE, INITIAL ENCOUNTER: ICD-10-CM

## 2023-11-30 NOTE — TELEPHONE ENCOUNTER
Jil order signed. Estrellita should send in a 12Return message so I can order for her or OK to create a TE.

## 2023-12-05 NOTE — TELEPHONE ENCOUNTER
Alysha - patient states she needs updated ICD-10 dx code for coverage, pended below.    LEIGH ANN SkeltonN, RN  [facility]

## 2023-12-15 ENCOUNTER — IMMUNIZATION (OUTPATIENT)
Dept: NURSING | Facility: CLINIC | Age: 53
End: 2023-12-15
Payer: COMMERCIAL

## 2023-12-15 PROCEDURE — 90480 ADMN SARSCOV2 VAC 1/ONLY CMP: CPT

## 2023-12-15 PROCEDURE — 91320 SARSCV2 VAC 30MCG TRS-SUC IM: CPT

## 2023-12-18 ENCOUNTER — ANCILLARY PROCEDURE (OUTPATIENT)
Dept: BONE DENSITY | Facility: CLINIC | Age: 53
End: 2023-12-18
Attending: NURSE PRACTITIONER
Payer: COMMERCIAL

## 2023-12-18 PROCEDURE — 77080 DXA BONE DENSITY AXIAL: CPT | Mod: TC | Performed by: PHYSICIAN ASSISTANT

## 2023-12-27 ENCOUNTER — MYC MEDICAL ADVICE (OUTPATIENT)
Dept: FAMILY MEDICINE | Facility: CLINIC | Age: 53
End: 2023-12-27
Payer: COMMERCIAL

## 2023-12-28 NOTE — TELEPHONE ENCOUNTER
Alysha-    Do you have any specific recommendations for her for an Endo?    LEIGH ANN SalasN RN  [facility]

## 2024-01-02 NOTE — TELEPHONE ENCOUNTER
Patient sent an update.  She has an appointment with Endocrinology but not until September 2024.  She is on the wait list.  Gisselle Gilman RN  [facility]

## 2024-01-02 NOTE — TELEPHONE ENCOUNTER
I think any of the MDs in our system would be great, but if she is able to find an affiliated partner where she can get in sooner I would be happy to send a referral

## 2024-02-12 ENCOUNTER — NURSE TRIAGE (OUTPATIENT)
Dept: FAMILY MEDICINE | Facility: CLINIC | Age: 54
End: 2024-02-12
Payer: COMMERCIAL

## 2024-02-12 NOTE — TELEPHONE ENCOUNTER
"Patient does not want to do to urgent care today; scheduled same day slot for tomorrow.    Melania Maciel, RN, BSN, PHN  [facility]      Reason for Disposition   Wheezing is present    Additional Information   Negative: Bluish (or gray) lips or face   Negative: SEVERE difficulty breathing (e.g., struggling for each breath, speaks in single words)   Negative: Rapid onset of cough and has hives   Negative: Coughing started suddenly after medicine, an allergic food or bee sting   Negative: Difficulty breathing after exposure to flames, smoke, or fumes   Negative: Sounds like a life-threatening emergency to the triager   Negative: Previous asthma attacks and this feels like asthma attack   Negative: Dry cough (non-productive; no sputum or minimal clear sputum) and within 14 days of COVID-19 Exposure   Negative: MODERATE difficulty breathing (e.g., speaks in phrases, SOB even at rest, pulse 100-120) and still present when not coughing   Negative: Chest pain present when not coughing   Negative: Passed out (i.e., fainted, collapsed and was not responding)   Negative: Patient sounds very sick or weak to the triager    Answer Assessment - Initial Assessment Questions  1. ONSET: \"When did the cough begin?\"       12 days ago  2. SEVERITY: \"How bad is the cough today?\"       mild  3. SPUTUM: \"Describe the color of your sputum\" (none, dry cough; clear, white, yellow, green)      none  4. HEMOPTYSIS: \"Are you coughing up any blood?\" If so ask: \"How much?\" (flecks, streaks, tablespoons, etc.)      none  5. DIFFICULTY BREATHING: \"Are you having difficulty breathing?\" If Yes, ask: \"How bad is it?\" (e.g., mild, moderate, severe)     - MILD: No SOB at rest, mild SOB with walking, speaks normally in sentences, can lie down, no retractions, pulse < 100.     - MODERATE: SOB at rest, SOB with minimal exertion and prefers to sit, cannot lie down flat, speaks in phrases, mild retractions, audible wheezing, " "pulse 100-120.     - SEVERE: Very SOB at rest, speaks in single words, struggling to breathe, sitting hunched forward, retractions, pulse > 120       Wheezing at rest and mild shortness of breath  6. FEVER: \"Do you have a fever?\" If Yes, ask: \"What is your temperature, how was it measured, and when did it start?\"      3 day fever about 12 days ago  7. CARDIAC HISTORY: \"Do you have any history of heart disease?\" (e.g., heart attack, congestive heart failure)       no  8. LUNG HISTORY: \"Do you have any history of lung disease?\"  (e.g., pulmonary embolus, asthma, emphysema)      no  9. PE RISK FACTORS: \"Do you have a history of blood clots?\" (or: recent major surgery, recent prolonged travel, bedridden)      No - 12 days ago several hour flight  10. OTHER SYMPTOMS: \"Do you have any other symptoms?\" (e.g., runny nose, wheezing, chest pain)        wheezing  11. PREGNANCY: \"Is there any chance you are pregnant?\" \"When was your last menstrual period?\"        no  12. TRAVEL: \"Have you traveled out of the country in the last month?\" (e.g., travel history, exposures)        Yes - [place]    Protocols used: Cough-A-OH    "

## 2024-02-12 NOTE — TELEPHONE ENCOUNTER
General Call    Contacts         Type Contact Phone/Fax    02/12/2024 09:56 AM CST Phone (Incoming) Radha Perry (Self) [phone] (M)          Reason for Call:  Was told by PCP she has openings daily and can get squeezed in     What are your questions or concerns:  Does not feel well. Has been battling a fever etc. Has taken several covid tests all negative. Just returned from travel and will be leaving next Wednesday. Wants to be seen prior to that. Stated she was open to seeing other providers if necessary and said she was aware of UC. Did advise all primary care providers are booked at least a month.    Date of last appointment with provider: 10/19/23    Could we send this information to you in [employer] or would you prefer to receive a phone call?:  [employer]

## 2024-02-13 ENCOUNTER — OFFICE VISIT (OUTPATIENT)
Dept: FAMILY MEDICINE | Facility: CLINIC | Age: 54
End: 2024-02-13
Payer: COMMERCIAL

## 2024-02-13 ENCOUNTER — ANCILLARY PROCEDURE (OUTPATIENT)
Dept: GENERAL RADIOLOGY | Facility: CLINIC | Age: 54
End: 2024-02-13
Attending: NURSE PRACTITIONER
Payer: COMMERCIAL

## 2024-02-13 VITALS
DIASTOLIC BLOOD PRESSURE: 70 MMHG | WEIGHT: 162.1 LBS | TEMPERATURE: 97.3 F | HEART RATE: 82 BPM | BODY MASS INDEX: 27.82 KG/M2 | OXYGEN SATURATION: 100 % | SYSTOLIC BLOOD PRESSURE: 112 MMHG | RESPIRATION RATE: 16 BRPM

## 2024-02-13 DIAGNOSIS — R05.2 SUBACUTE COUGH: ICD-10-CM

## 2024-02-13 DIAGNOSIS — R05.2 SUBACUTE COUGH: Primary | ICD-10-CM

## 2024-02-13 LAB
ANION GAP SERPL CALCULATED.3IONS-SCNC: 10 MMOL/L (ref 7–15)
BUN SERPL-MCNC: 14.1 MG/DL (ref 6–20)
CALCIUM SERPL-MCNC: 9.6 MG/DL (ref 8.6–10)
CHLORIDE SERPL-SCNC: 104 MMOL/L (ref 98–107)
CREAT SERPL-MCNC: 0.61 MG/DL (ref 0.51–0.95)
DEPRECATED HCO3 PLAS-SCNC: 24 MMOL/L (ref 22–29)
EGFRCR SERPLBLD CKD-EPI 2021: >90 ML/MIN/1.73M2
ERYTHROCYTE [DISTWIDTH] IN BLOOD BY AUTOMATED COUNT: 12.6 % (ref 10–15)
GLUCOSE SERPL-MCNC: 93 MG/DL (ref 70–99)
HCT VFR BLD AUTO: 37.9 % (ref 35–47)
HGB BLD-MCNC: 12.6 G/DL (ref 11.7–15.7)
MCH RBC QN AUTO: 29.2 PG (ref 26.5–33)
MCHC RBC AUTO-ENTMCNC: 33.2 G/DL (ref 31.5–36.5)
MCV RBC AUTO: 88 FL (ref 78–100)
PLATELET # BLD AUTO: 457 10E3/UL (ref 150–450)
POTASSIUM SERPL-SCNC: 4.1 MMOL/L (ref 3.4–5.3)
RBC # BLD AUTO: 4.32 10E6/UL (ref 3.8–5.2)
SODIUM SERPL-SCNC: 138 MMOL/L (ref 135–145)
WBC # BLD AUTO: 9.3 10E3/UL (ref 4–11)

## 2024-02-13 PROCEDURE — 99214 OFFICE O/P EST MOD 30 MIN: CPT | Performed by: NURSE PRACTITIONER

## 2024-02-13 PROCEDURE — 80048 BASIC METABOLIC PNL TOTAL CA: CPT | Performed by: NURSE PRACTITIONER

## 2024-02-13 PROCEDURE — 36415 COLL VENOUS BLD VENIPUNCTURE: CPT | Performed by: NURSE PRACTITIONER

## 2024-02-13 PROCEDURE — 71046 X-RAY EXAM CHEST 2 VIEWS: CPT | Mod: TC | Performed by: RADIOLOGY

## 2024-02-13 PROCEDURE — 85027 COMPLETE CBC AUTOMATED: CPT | Performed by: NURSE PRACTITIONER

## 2024-02-13 RX ORDER — BENZONATATE 200 MG/1
200 CAPSULE ORAL 3 TIMES DAILY PRN
Qty: 21 CAPSULE | Refills: 0 | Status: SHIPPED | OUTPATIENT
Start: 2024-02-13

## 2024-02-13 NOTE — PATIENT INSTRUCTIONS
Postnasal drip cough/subacute cough related to illness  - tessalon cough suppressant   - mucinex D for cough expectorant and antihistamine to dry nasal passages reducing postnasal drip  - flonase 1-2 spray each nostril twice a day

## 2024-02-13 NOTE — PROGRESS NOTES
"  Assessment & Plan     Subacute cough  No red flags; imaging low suspicion for PNA; continue symptomatic cares  - XR Chest 2 Views  - benzonatate (TESSALON) 200 MG capsule  Dispense: 21 capsule; Refill: 0  - CBC with platelets  - Basic metabolic panel  - CBC with platelets  - Basic metabolic panel                BMI  Estimated body mass index is 27.82 kg/m  as calculated from the following:    Height as of 11/1/23: 1.626 m (5' 4\").    Weight as of this encounter: 73.5 kg (162 lb 1.6 oz).             Ovi Garcia is a 53 year old, presenting for the following health issues:  URI      2/13/2024     8:46 AM   Additional Questions   Roomed by AURORA RN     53 year old  year old female with PMH   Patient Active Problem List   Diagnosis Code    Diffuse cystic mastopathy N60.19    Herpes simplex virus (HSV) infection B00.9    DUB (dysfunctional uterine bleeding) N93.8    CARDIOVASCULAR SCREENING; LDL GOAL LESS THAN 160 Z13.6    Vitamin D deficiency E55.9    Neck sprain S13.9XXA    History of ovarian cyst Z87.42   in clinic for acute office visit related to/establish care/to discuss     Tested negative for covid x3   Cough moist; wheezing; no shortness of breath; + PND; no sinus congestion;     URI    History of Present Illness       Reason for visit:  Cough and wheezing  Symptom onset:  1-2 weeks ago    She eats 4 or more servings of fruits and vegetables daily.She consumes 1 sweetened beverage(s) daily.She exercises with enough effort to increase her heart rate 10 to 19 minutes per day.  She exercises with enough effort to increase her heart rate 4 days per week.   She is taking medications regularly.                     Objective    /70 (BP Location: Right arm, Patient Position: Sitting, Cuff Size: Adult Large)   Pulse 82   Temp 97.3  F (36.3  C) (Temporal)   Resp 16   Wt 73.5 kg (162 lb 1.6 oz)   SpO2 100%   BMI 27.82 kg/m    Body mass index is 27.82 kg/m .  Physical Exam       Results for orders placed " or performed in visit on 02/13/24   XR Chest 2 Views     Status: None    Narrative    CHEST TWO VIEWS  2/13/2024 9:57 AM     HISTORY:  Subacute cough.    COMPARISON: 6/2/2020.      Impression    IMPRESSION: Negative chest. Lungs clear. Cholecystectomy.    LEYDI HUSTON MD         SYSTEM ID:  [id number]   Results for orders placed or performed in visit on 02/13/24   CBC with platelets     Status: Abnormal   Result Value Ref Range    WBC Count 9.3 4.0 - 11.0 10e3/uL    RBC Count 4.32 3.80 - 5.20 10e6/uL    Hemoglobin 12.6 11.7 - 15.7 g/dL    Hematocrit 37.9 35.0 - 47.0 %    MCV 88 78 - 100 fL    MCH 29.2 26.5 - 33.0 pg    MCHC 33.2 31.5 - 36.5 g/dL    RDW 12.6 10.0 - 15.0 %    Platelet Count 457 (H) 150 - 450 10e3/uL   Basic metabolic panel     Status: Normal   Result Value Ref Range    Sodium 138 135 - 145 mmol/L    Potassium 4.1 3.4 - 5.3 mmol/L    Chloride 104 98 - 107 mmol/L    Carbon Dioxide (CO2) 24 22 - 29 mmol/L    Anion Gap 10 7 - 15 mmol/L    Urea Nitrogen 14.1 6.0 - 20.0 mg/dL    Creatinine 0.61 0.51 - 0.95 mg/dL    GFR Estimate >90 >60 mL/min/1.73m2    Calcium 9.6 8.6 - 10.0 mg/dL    Glucose 93 70 - 99 mg/dL           Signed Electronically by: RONY Taylor CNP

## 2024-02-21 NOTE — RESULT ENCOUNTER NOTE
Jean Paul Garcia,    Your recent results are normal. Any results slightly above or below the normal range have been evaluated as clinically stable.     Let me know if you have any questions or concerns.    Sincerely,  RONY Taylor CNP

## 2024-06-28 ENCOUNTER — NURSE TRIAGE (OUTPATIENT)
Dept: NURSING | Facility: CLINIC | Age: 54
End: 2024-06-28

## 2024-06-28 ENCOUNTER — OFFICE VISIT (OUTPATIENT)
Dept: URGENT CARE | Facility: URGENT CARE | Age: 54
End: 2024-06-28
Payer: COMMERCIAL

## 2024-06-28 VITALS
HEART RATE: 80 BPM | TEMPERATURE: 97.3 F | DIASTOLIC BLOOD PRESSURE: 70 MMHG | OXYGEN SATURATION: 98 % | SYSTOLIC BLOOD PRESSURE: 108 MMHG

## 2024-06-28 DIAGNOSIS — R23.8 VESICULAR RASH: Primary | ICD-10-CM

## 2024-06-28 DIAGNOSIS — R23.8 VESICULAR RASH: ICD-10-CM

## 2024-06-28 PROCEDURE — 87798 DETECT AGENT NOS DNA AMP: CPT | Performed by: PHYSICIAN ASSISTANT

## 2024-06-28 PROCEDURE — 99213 OFFICE O/P EST LOW 20 MIN: CPT | Performed by: PHYSICIAN ASSISTANT

## 2024-06-28 RX ORDER — VALACYCLOVIR HYDROCHLORIDE 1 G/1
1000 TABLET, FILM COATED ORAL 3 TIMES DAILY
Qty: 21 TABLET | Refills: 0 | Status: SHIPPED | OUTPATIENT
Start: 2024-06-28 | End: 2024-06-28

## 2024-06-28 RX ORDER — VALACYCLOVIR HYDROCHLORIDE 1 G/1
1000 TABLET, FILM COATED ORAL 3 TIMES DAILY
Qty: 21 TABLET | Refills: 0 | Status: SHIPPED | OUTPATIENT
Start: 2024-06-28

## 2024-06-28 ASSESSMENT — ENCOUNTER SYMPTOMS: FEVER: 0

## 2024-06-28 NOTE — Clinical Note
Hello, Yesterday I ordered the Zoster blood test instead of swab. It was changed to viral culture. It should have been PCR. Any way to get the PCR test?  Anastasia Langley PA-C

## 2024-06-29 NOTE — PROGRESS NOTES
Assessment & Plan:        ICD-10-CM    1. Vesicular rash  R23.8 Varicella Zoster Virus Antibody IgG     Viral Culture Non-respiratory     Viral Culture Non-respiratory     DISCONTINUED: valACYclovir (VALTREX) 1000 mg tablet     CANCELED: Varicella Zoster Virus Antibody IgG            Plan/Clinical Decision Making:    Patient presents with rash that started several days ago. Noticed today. Has two vesicles left mid abdomen. Sensitivity with touch to rash, concerned about Shingles. Unsure at this time. Will do swab, zoster PCR pending.   Will treat for possible Shingles with course of Valtrex. If negative testing consider other etiologies such as contact dermatitis or bug bites.         No follow-ups on file.     At the end of the encounter, I discussed results, diagnosis, medications. Discussed red flags for immediate return to clinic/ER, as well as indications for follow up if no improvement. Patient understood and agreed to plan. Patient was stable for discharge.        Anastasia Langley PA-C on 6/28/2024 at 8:00 PM          Subjective:     HPI:    Radha is a 54 year old female who presents to clinic today for the following health issues:  Chief Complaint   Patient presents with    Urgent Care    Derm Problem     Patient presents with a rash on her left torso.      HPI    Rash on left torso. Had shingles in past. Had possible spider/bug bite a couple of days ago. She looked at rash and now worried about Shingles.   Slight HA, mildly fatigue.   Rash sensitive and some itching. Has sensitivity when shirt touches rash.     SH: dog passed away this week    Review of Systems   Constitutional:  Negative for fever.   Respiratory:  Negative for cough.    Cardiovascular:  Negative for chest pain.         Patient Active Problem List   Diagnosis    Diffuse cystic mastopathy    Herpes simplex virus (HSV) infection    DUB (dysfunctional uterine bleeding)    CARDIOVASCULAR SCREENING; LDL GOAL LESS THAN 160    Vitamin D  deficiency    Neck sprain    History of ovarian cyst        Past Medical History:   Diagnosis Date    Diffuse cystic mastopathy     1991 had mammogram - left breast more dense    Family history of blood disorder     related to a transfusion in her paternal grandmother    FH: non-Hodgkin's lymphoma     Mother 2009    Herpes simplex without mention of complication     herpes labialis    Infectious mononucleosis 1987    Other specified temporomandibular joint disorders     Undiagnosed cardiac murmurs     innocent murmur       Social History     Tobacco Use    Smoking status: Never    Smokeless tobacco: Never   Substance Use Topics    Alcohol use: Yes     Comment: 1-2 per week             Objective:     Vitals:    06/28/24 1955   BP: 108/70   BP Location: Right arm   Pulse: 80   Temp: 97.3  F (36.3  C)   TempSrc: Temporal   SpO2: 98%         Physical Exam   EXAM:   Pleasant, alert, appropriate appearance. NAD.  Head Exam: Normocephalic, atraumatic.  Neck/Thyroid Exam:  No LAD.    Chest/Respiratory Exam: CTAB.  Ext/musculoskeletal: Grossly intact, No edema  Neuro: CN II-XII intact grossly intact.  Skin:Left abdomen with two vesicular lesions at T 11 dermatome.       Results:  No results found for any visits on 06/28/24.

## 2024-06-29 NOTE — TELEPHONE ENCOUNTER
Call from patient asking Rx prescribed tonight be resent to an open pharmacy. Resent to [facility] on [address] in [place] for patient to  now.    Bhargav Blackburn RN, BSN  Triage Nurse Advisor            Reason for Disposition   [1] Prescription prescribed recently is not at pharmacy AND [2] triager has access to patient's EMR AND [3] prescription is recorded in the EMR    Protocols used: Medication Question Call-A-AH

## 2024-06-30 ASSESSMENT — ENCOUNTER SYMPTOMS: COUGH: 0

## 2024-07-02 LAB — VZV DNA SPEC QL NAA+PROBE: NOT DETECTED

## 2024-10-08 ENCOUNTER — MYC REFILL (OUTPATIENT)
Dept: FAMILY MEDICINE | Facility: CLINIC | Age: 54
End: 2024-10-08
Payer: COMMERCIAL

## 2024-10-08 DIAGNOSIS — N95.2 VAGINAL ATROPHY: ICD-10-CM

## 2024-10-09 RX ORDER — ESTRADIOL 0.1 MG/G
2 CREAM VAGINAL
Qty: 42.5 G | Refills: 0 | Status: SHIPPED | OUTPATIENT
Start: 2024-10-09 | End: 2024-10-22

## 2024-10-15 ENCOUNTER — OFFICE VISIT (OUTPATIENT)
Dept: DERMATOLOGY | Facility: CLINIC | Age: 54
End: 2024-10-15
Payer: COMMERCIAL

## 2024-10-15 DIAGNOSIS — D18.01 ANGIOMA OF SKIN: ICD-10-CM

## 2024-10-15 DIAGNOSIS — D22.9 NEVUS: Primary | ICD-10-CM

## 2024-10-15 DIAGNOSIS — L81.4 LENTIGO: ICD-10-CM

## 2024-10-15 DIAGNOSIS — L82.1 SEBORRHEIC KERATOSIS: ICD-10-CM

## 2024-10-15 PROCEDURE — G2211 COMPLEX E/M VISIT ADD ON: HCPCS | Performed by: PHYSICIAN ASSISTANT

## 2024-10-15 PROCEDURE — 99213 OFFICE O/P EST LOW 20 MIN: CPT | Performed by: PHYSICIAN ASSISTANT

## 2024-10-15 NOTE — PROGRESS NOTES
HPI:   Chief complaints: Kary Perry is a 52 year old female who presents for Full skin cancer screening to rule out skin cancer   Last Skin Exam: 1.5 years ago      1st Baseline: no  Personal HX of Skin Cancer: no   Personal HX of Malignant Melanoma: no   Family HX of Skin Cancer / Malignant Melanoma: no  Personal HX of Atypical Moles:   no  Risk factors: history of sun exposure and burns; some tanning bed use in the past  New / Changing lesions: no but wart on the foot is still present  Social History: Here today with wife Estrellita. They like to sail. Went to [facility], [facility] and [facility] this past year   On review of systems, there are no further skin complaints, patient is feeling otherwise well.  See patient intake sheet.  ROS of the following were done and are negative: Constitutional, Eyes, Ears, Nose,   Mouth, Throat, Cardiovascular, Respiratory, GI, Genitourinary, Musculoskeletal,   Psychiatric, Endocrine, Allergic/Immunologic.    PHYSICAL EXAM:   There were no vitals taken for this visit.  Skin exam performed as follows: Type 2 skin. Mood appropriate  Alert and Oriented X 3. Well developed, well nourished in no distress.  General appearance: Normal  Head including face: Normal  Eyes: conjunctiva and lids: Normal  Mouth: Lips, teeth, gums: Normal  Neck: Normal  Chest-breast/axillae: Normal  Back: Normal  Spleen and liver: Normal  Cardiovascular: Exam of peripheral vascular system by observation for swelling, varicosities, edema: Normal  Genitalia: groin, buttocks: Normal  Extremities: digits/nails (clubbing): Normal  Eccrine and Apocrine glands: Normal  Right upper extremity: Normal  Left upper extremity: Normal  Right lower extremity: Normal  Left lower extremity: Normal  Skin: Scalp and body hair: See below    Pt deferred exam of breasts, groin, buttocks: No    Other physical findings:  1. Multiple pigmented macules on extremities and trunk  2. Multiple pigmented macules on face, trunk and  extremities  3. Multiple vascular papules on trunk, arms and legs  4. Multiple scattered keratotic plaques         Except as noted above, no other signs of skin cancer or melanoma.     ASSESSMENT/PLAN:   Benign Full skin cancer screening today. . Patient with history of none  Advised on monthly self exams and 1 year  Patient Education: Appropriate brochures given.    Multiple benign appearing nevi on arms, legs and trunk. Discussed ABCDEs of melanoma and sunscreen.   Multiple lentigos on arms, legs and trunk. Advised benign, no treatment needed.  Multiple scattered angiomas. Advised benign, no treatment needed.   Seborrheic keratosis on arms, legs and trunk. Advised benign, no treatment needed            Follow-up: yearly FSE/PRN sooner    1.) Patient was asked about new and changing moles. YES  2.) Patient received a complete physical skin examination: YES  3.) Patient was counseled to perform a monthly self skin examination: YES  Scribed By: Marley Calle, MS, PA-C

## 2024-10-15 NOTE — LETTER
10/15/2024      Kary Perry  [address] [address]      Dear Colleague,    Thank you for referring your patient, Kary Perry, to the [facility]. Please see a copy of my visit note below.    HPI:   Chief complaints: Kary Perry is a 52 year old female who presents for Full skin cancer screening to rule out skin cancer   Last Skin Exam: 1.5 years ago      1st Baseline: no  Personal HX of Skin Cancer: no   Personal HX of Malignant Melanoma: no   Family HX of Skin Cancer / Malignant Melanoma: no  Personal HX of Atypical Moles:   no  Risk factors: history of sun exposure and burns; some tanning bed use in the past  New / Changing lesions: no but wart on the foot is still present  Social History: Here today with wife Estrellita. They like to sail. Went to [place] [facility] [place] this past year   On review of systems, there are no further skin complaints, patient is feeling otherwise well.  See patient intake sheet.  ROS of the following were done and are negative: Constitutional, Eyes, Ears, Nose,   Mouth, Throat, Cardiovascular, Respiratory, GI, Genitourinary, Musculoskeletal,   Psychiatric, Endocrine, Allergic/Immunologic.    PHYSICAL EXAM:   There were no vitals taken for this visit.  Skin exam performed as follows: Type 2 skin. Mood appropriate  Alert and Oriented X 3. Well developed, well nourished in no distress.  General appearance: Normal  Head including face: Normal  Eyes: conjunctiva and lids: Normal  Mouth: Lips, teeth, gums: Normal  Neck: Normal  Chest-breast/axillae: Normal  Back: Normal  Spleen and liver: Normal  Cardiovascular: Exam of peripheral vascular system by observation for swelling, varicosities, edema: Normal  Genitalia: groin, buttocks: Normal  Extremities: digits/nails (clubbing): Normal  Eccrine and Apocrine glands: Normal  Right upper extremity: Normal  Left upper extremity: Normal  Right lower extremity:  Normal  Left lower extremity: Normal  Skin: Scalp and body hair: See below    Pt deferred exam of breasts, groin, buttocks: No    Other physical findings:  1. Multiple pigmented macules on extremities and trunk  2. Multiple pigmented macules on face, trunk and extremities  3. Multiple vascular papules on trunk, arms and legs  4. Multiple scattered keratotic plaques         Except as noted above, no other signs of skin cancer or melanoma.     ASSESSMENT/PLAN:   Benign Full skin cancer screening today. . Patient with history of none  Advised on monthly self exams and 1 year  Patient Education: Appropriate brochures given.    Multiple benign appearing nevi on arms, legs and trunk. Discussed ABCDEs of melanoma and sunscreen.   Multiple lentigos on arms, legs and trunk. Advised benign, no treatment needed.  Multiple scattered angiomas. Advised benign, no treatment needed.   Seborrheic keratosis on arms, legs and trunk. Advised benign, no treatment needed            Follow-up: yearly FSE/PRN sooner    1.) Patient was asked about new and changing moles. YES  2.) Patient received a complete physical skin examination: YES  3.) Patient was counseled to perform a monthly self skin examination: YES  Scribed By: Marley Calle, MS, PAYESI        Again, thank you for allowing me to participate in the care of your patient.        Sincerely,        Marley Calle PA-C

## 2024-10-21 SDOH — HEALTH STABILITY: PHYSICAL HEALTH: ON AVERAGE, HOW MANY DAYS PER WEEK DO YOU ENGAGE IN MODERATE TO STRENUOUS EXERCISE (LIKE A BRISK WALK)?: 3 DAYS

## 2024-10-21 SDOH — HEALTH STABILITY: PHYSICAL HEALTH: ON AVERAGE, HOW MANY MINUTES DO YOU ENGAGE IN EXERCISE AT THIS LEVEL?: 40 MIN

## 2024-10-21 ASSESSMENT — SOCIAL DETERMINANTS OF HEALTH (SDOH): HOW OFTEN DO YOU GET TOGETHER WITH FRIENDS OR RELATIVES?: THREE TIMES A WEEK

## 2024-10-22 ENCOUNTER — OFFICE VISIT (OUTPATIENT)
Dept: FAMILY MEDICINE | Facility: CLINIC | Age: 54
End: 2024-10-22
Attending: NURSE PRACTITIONER
Payer: COMMERCIAL

## 2024-10-22 VITALS
WEIGHT: 165 LBS | HEIGHT: 66 IN | BODY MASS INDEX: 26.52 KG/M2 | DIASTOLIC BLOOD PRESSURE: 79 MMHG | TEMPERATURE: 97.4 F | SYSTOLIC BLOOD PRESSURE: 124 MMHG | HEART RATE: 73 BPM | RESPIRATION RATE: 18 BRPM | OXYGEN SATURATION: 99 %

## 2024-10-22 DIAGNOSIS — N95.2 VAGINAL ATROPHY: ICD-10-CM

## 2024-10-22 DIAGNOSIS — Z13.1 SCREENING FOR DIABETES MELLITUS: ICD-10-CM

## 2024-10-22 DIAGNOSIS — Z13.0 SCREENING FOR IRON DEFICIENCY ANEMIA: ICD-10-CM

## 2024-10-22 DIAGNOSIS — Z13.220 LIPID SCREENING: ICD-10-CM

## 2024-10-22 DIAGNOSIS — Z00.00 ROUTINE GENERAL MEDICAL EXAMINATION AT A HEALTH CARE FACILITY: Primary | ICD-10-CM

## 2024-10-22 LAB
ANION GAP SERPL CALCULATED.3IONS-SCNC: 11 MMOL/L (ref 7–15)
BUN SERPL-MCNC: 14.9 MG/DL (ref 6–20)
CALCIUM SERPL-MCNC: 9.8 MG/DL (ref 8.8–10.4)
CHLORIDE SERPL-SCNC: 105 MMOL/L (ref 98–107)
CHOLEST SERPL-MCNC: 223 MG/DL
CREAT SERPL-MCNC: 0.63 MG/DL (ref 0.51–0.95)
EGFRCR SERPLBLD CKD-EPI 2021: >90 ML/MIN/1.73M2
ERYTHROCYTE [DISTWIDTH] IN BLOOD BY AUTOMATED COUNT: 12.4 % (ref 10–15)
FASTING STATUS PATIENT QL REPORTED: YES
FASTING STATUS PATIENT QL REPORTED: YES
GLUCOSE SERPL-MCNC: 96 MG/DL (ref 70–99)
HCO3 SERPL-SCNC: 23 MMOL/L (ref 22–29)
HCT VFR BLD AUTO: 40.7 % (ref 35–47)
HDLC SERPL-MCNC: 56 MG/DL
HGB BLD-MCNC: 13.2 G/DL (ref 11.7–15.7)
LDLC SERPL CALC-MCNC: 136 MG/DL
MCH RBC QN AUTO: 29.3 PG (ref 26.5–33)
MCHC RBC AUTO-ENTMCNC: 32.4 G/DL (ref 31.5–36.5)
MCV RBC AUTO: 90 FL (ref 78–100)
NONHDLC SERPL-MCNC: 167 MG/DL
PLATELET # BLD AUTO: 396 10E3/UL (ref 150–450)
POTASSIUM SERPL-SCNC: 3.8 MMOL/L (ref 3.4–5.3)
RBC # BLD AUTO: 4.51 10E6/UL (ref 3.8–5.2)
SODIUM SERPL-SCNC: 139 MMOL/L (ref 135–145)
TRIGL SERPL-MCNC: 156 MG/DL
WBC # BLD AUTO: 8.5 10E3/UL (ref 4–11)

## 2024-10-22 PROCEDURE — 85027 COMPLETE CBC AUTOMATED: CPT | Performed by: NURSE PRACTITIONER

## 2024-10-22 PROCEDURE — 36415 COLL VENOUS BLD VENIPUNCTURE: CPT | Performed by: NURSE PRACTITIONER

## 2024-10-22 PROCEDURE — 99396 PREV VISIT EST AGE 40-64: CPT | Performed by: NURSE PRACTITIONER

## 2024-10-22 PROCEDURE — 80061 LIPID PANEL: CPT | Performed by: NURSE PRACTITIONER

## 2024-10-22 PROCEDURE — 80048 BASIC METABOLIC PNL TOTAL CA: CPT | Performed by: NURSE PRACTITIONER

## 2024-10-22 RX ORDER — ESTRADIOL 0.1 MG/G
2 CREAM VAGINAL
Qty: 42.5 G | Refills: 4 | Status: SHIPPED | OUTPATIENT
Start: 2024-10-23

## 2024-10-22 RX ORDER — PSYLLIUM HUSK 0.4 G
CAPSULE ORAL
COMMUNITY
Start: 2024-02-05

## 2024-10-22 ASSESSMENT — PAIN SCALES - GENERAL: PAINLEVEL: NO PAIN (0)

## 2024-10-22 NOTE — PROGRESS NOTES
"Preventive Care Visit  [facility]  Alysha Marsh DNP, Nurse Practitioner Primary Care  Oct 22, 2024      Assessment & Plan     Routine general medical examination at a health care facility  Reviewed medical/social/family history and health maintenance   Declined today, will plan to do through the pharmacy.  Recent skin check with dermatology.    Vaginal atrophy   Stable, tolerating med, no changes at this time  - estradiol (ESTRACE) 0.1 MG/GM vaginal cream; Place 2 g vaginally three times a week.    Lipid screening  - Lipid panel reflex to direct LDL Fasting; Future  - Lipid panel reflex to direct LDL Fasting    Screening for iron deficiency anemia  - CBC with platelets; Future  - CBC with platelets    Screening for diabetes mellitus  - Basic metabolic panel  (Ca, Cl, CO2, Creat, Gluc, K, Na, BUN); Future  - Basic metabolic panel  (Ca, Cl, CO2, Creat, Gluc, K, Na, BUN)    Patient has been advised of split billing requirements and indicates understanding: Yes        BMI  Estimated body mass index is 26.83 kg/m  as calculated from the following:    Height as of this encounter: 1.67 m (5' 5.75\").    Weight as of this encounter: 74.8 kg (165 lb).       Counseling  Appropriate preventive services were addressed with this patient via screening, questionnaire, or discussion as appropriate for fall prevention, nutrition, physical activity, Tobacco-use cessation, social engagement, weight loss and cognition.  Checklist reviewing preventive services available has been given to the patient.  Reviewed patient's diet, addressing concerns and/or questions.   She is at risk for lack of exercise and has been provided with information to increase physical activity for the benefit of her well-being.   She is at risk for psychosocial distress and has been provided with information to reduce risk.           Ovi Garcia is a 54 year old, presenting for the following:  Physical        10/22/2024     " 9:29 AM   Additional Questions   Roomed by Padmaja MAGUIRE   Accompanied by Wife        Health Care Directive  Patient has a Health Care Directive on file  Advance care planning document is on file and is current.    HPI        10/21/2024   General Health   How would you rate your overall physical health? Excellent   Feel stress (tense, anxious, or unable to sleep) Only a little      (!) STRESS CONCERN      10/21/2024   Nutrition   Three or more servings of calcium each day? Yes   Diet: Regular (no restrictions)   How many servings of fruit and vegetables per day? 4 or more   How many sweetened beverages each day? 0-1            10/21/2024   Exercise   Days per week of moderate/strenous exercise 3 days   Average minutes spent exercising at this level 40 min            10/21/2024   Social Factors   Frequency of gathering with friends or relatives Three times a week   Worry food won't last until get money to buy more No   Food not last or not have enough money for food? No   Do you have housing? (Housing is defined as stable permanent housing and does not include staying ouside in a car, in a tent, in an abandoned building, in an overnight shelter, or couch-surfing.) Yes   Are you worried about losing your housing? No   Lack of transportation? No   Unable to get utilities (heat,electricity)? No            10/21/2024   Fall Risk   Fallen 2 or more times in the past year? No   Trouble with walking or balance? No             10/21/2024   Dental   Dentist two times every year? Yes            10/21/2024   TB Screening   Were you born outside of the [place]? No            Today's PHQ-2 Score:       10/21/2024     2:03 PM   PHQ-2 ( 1999 [employer])   Q1: Little interest or pleasure in doing things 0   Q2: Feeling down, depressed or hopeless 0   PHQ-2 Score 0   Q1: Little interest or pleasure in doing things Not at all   Q2: Feeling down, depressed or hopeless Not at all   PHQ-2 Score 0           10/21/2024   Substance Use   Alcohol more than  "3/day or more than 7/wk No   Do you use any other substances recreationally? No        Social History     Tobacco Use    Smoking status: Never    Smokeless tobacco: Never   Vaping Use    Vaping status: Never Used   Substance Use Topics    Alcohol use: Yes     Comment: 1-2 per week    Drug use: No             10/21/2024   Breast Cancer Screening   Family history of breast, colon, or ovarian cancer? Yes          10/21/2024   LAST FHS-7 RESULTS   1st degree relative breast or ovarian cancer No   Any relative bilateral breast cancer No   Any male have breast cancer No   Any ONE woman have BOTH breast AND ovarian cancer No   Any woman with breast cancer before 50yrs No   2 or more relatives with breast AND/OR ovarian cancer No   2 or more relatives with breast AND/OR bowel cancer No           Mammogram Screening - Mammogram every 1-2 years updated in Health Maintenance based on mutual decision making        10/21/2024   STI Screening   New sexual partner(s) since last STI/HIV test? No        History of abnormal Pap smear: No - age 30- 64 PAP with HPV every 5 years recommended        Latest Ref Rng & Units 11/25/2020     8:39 AM 11/25/2020     7:21 AM 5/18/2018     8:35 AM   PAP / HPV   PAP (Historical)   NIL     HPV 16 DNA NEG^Negative Negative   Negative    HPV 18 DNA NEG^Negative Negative   Negative    Other HR HPV NEG^Negative Negative   Negative      ASCVD Risk   The 10-year ASCVD risk score (Manjit GONZALEZ, et al., 2019) is: 2.1%    Values used to calculate the score:      Age: 54 years      Sex: Female      Is Non- : No      Diabetic: No      Tobacco smoker: No      Systolic Blood Pressure: 124 mmHg      Is BP treated: No      HDL Cholesterol: 54 mg/dL      Total Cholesterol: 217 mg/dL           Reviewed and updated as needed this visit by Provider                             Objective    Exam  /79   Pulse 73   Temp 97.4  F (36.3  C) (Temporal)   Resp 18   Ht 1.67 m (5' 5.75\")  " " Wt 74.8 kg (165 lb)   LMP  (LMP Unknown)   SpO2 99%   BMI 26.83 kg/m     Estimated body mass index is 26.83 kg/m  as calculated from the following:    Height as of this encounter: 1.67 m (5' 5.75\").    Weight as of this encounter: 74.8 kg (165 lb).    Physical Exam  GENERAL: alert and no distress  EYES: Eyes grossly normal to inspection, PERRL and conjunctivae and sclerae normal  HENT: ear canals and TM's normal, nose and mouth without ulcers or lesions  NECK: no adenopathy, no asymmetry, masses, or scars  RESP: lungs clear to auscultation - no rales, rhonchi or wheezes  BREAST: normal without masses, tenderness or nipple discharge and no palpable axillary masses or adenopathy  CV: regular rate and rhythm, normal S1 S2, no S3 or S4, no murmur, click or rub, no peripheral edema  ABDOMEN: soft, nontender, no hepatosplenomegaly, no masses and bowel sounds normal  MS: no gross musculoskeletal defects noted, no edema  SKIN: no suspicious lesions or rashes  NEURO: Normal strength and tone, mentation intact and speech normal  PSYCH: mentation appears normal, affect normal/bright        Signed Electronically by: Alysha Marsh DNP    "

## 2024-10-22 NOTE — PATIENT INSTRUCTIONS
Patient Education   Preventive Care Advice   This is general advice given by our system to help you stay healthy. However, your care team may have specific advice just for you. Please talk to your care team about your preventive care needs.  Nutrition  Eat 5 or more servings of fruits and vegetables each day.  Try wheat bread, brown rice and whole grain pasta (instead of white bread, rice, and pasta).  Get enough calcium and vitamin D. Check the label on foods and aim for 100% of the RDA (recommended daily allowance).  Lifestyle  Exercise at least 150 minutes each week  (30 minutes a day, 5 days a week).  Do muscle strengthening activities 2 days a week. These help control your weight and prevent disease.  No smoking.  Wear sunscreen to prevent skin cancer.  Have a dental exam and cleaning every 6 months.  Yearly exams  See your health care team every year to talk about:  Any changes in your health.  Any medicines your care team has prescribed.  Preventive care, family planning, and ways to prevent chronic diseases.  Shots (vaccines)   HPV shots (up to age 26), if you've never had them before.  Hepatitis B shots (up to age 59), if you've never had them before.  COVID-19 shot: Get this shot when it's due.  Flu shot: Get a flu shot every year.  Tetanus shot: Get a tetanus shot every 10 years.  Pneumococcal, hepatitis A, and RSV shots: Ask your care team if you need these based on your risk.  Shingles shot (for age 50 and up)  General health tests  Diabetes screening:  Starting at age 35, Get screened for diabetes at least every 3 years.  If you are younger than age 35, ask your care team if you should be screened for diabetes.  Cholesterol test: At age 39, start having a cholesterol test every 5 years, or more often if advised.  Bone density scan (DEXA): At age 50, ask your care team if you should have this scan for osteoporosis (brittle bones).  Hepatitis C: Get tested at least once in your life.  STIs (sexually  transmitted infections)  Before age 24: Ask your care team if you should be screened for STIs.  After age 24: Get screened for STIs if you're at risk. You are at risk for STIs (including HIV) if:  You are sexually active with more than one person.  You don't use condoms every time.  You or a partner was diagnosed with a sexually transmitted infection.  If you are at risk for HIV, ask about PrEP medicine to prevent HIV.  Get tested for HIV at least once in your life, whether you are at risk for HIV or not.  Cancer screening tests  Cervical cancer screening: If you have a cervix, begin getting regular cervical cancer screening tests starting at age 21.  Breast cancer scan (mammogram): If you've ever had breasts, begin having regular mammograms starting at age 40. This is a scan to check for breast cancer.  Colon cancer screening: It is important to start screening for colon cancer at age 45.  Have a colonoscopy test every 10 years (or more often if you're at risk) Or, ask your provider about stool tests like a FIT test every year or Cologuard test every 3 years.  To learn more about your testing options, visit:   .  For help making a decision, visit:   https://bit.ly/qv69394.  Prostate cancer screening test: If you have a prostate, ask your care team if a prostate cancer screening test (PSA) at age 55 is right for you.  Lung cancer screening: If you are a current or former smoker ages 50 to 80, ask your care team if ongoing lung cancer screenings are right for you.  For informational purposes only. Not to replace the advice of your health care provider. Copyright   2023 [employer]. All rights reserved. Clinically reviewed by the [facility] Transitions Program. [employer] [id number] - REV 01/24.

## 2024-10-25 ENCOUNTER — PATIENT OUTREACH (OUTPATIENT)
Dept: CARE COORDINATION | Facility: CLINIC | Age: 54
End: 2024-10-25
Payer: COMMERCIAL

## 2024-10-30 ENCOUNTER — IMMUNIZATION (OUTPATIENT)
Dept: FAMILY MEDICINE | Facility: CLINIC | Age: 54
End: 2024-10-30
Payer: COMMERCIAL

## 2024-10-30 DIAGNOSIS — Z23 NEED FOR PROPHYLACTIC VACCINATION AND INOCULATION AGAINST INFLUENZA: Primary | ICD-10-CM

## 2024-10-30 PROCEDURE — 99207 PR NO CHARGE NURSE ONLY: CPT

## 2024-10-30 PROCEDURE — 90471 IMMUNIZATION ADMIN: CPT

## 2024-10-30 PROCEDURE — 90673 RIV3 VACCINE NO PRESERV IM: CPT

## 2024-11-05 NOTE — CONFIDENTIAL NOTE
RECORDS RECEIVED FROM: internal    DATE RECEIVED: 11.22.24    NOTES (FOR ALL VISITS) STATUS DETAILS   OFFICE NOTES from referring provider internal    Alysha Marsh, MRAIAH      MEDICATION LIST internal     IMAGING      DEXASCAN internal  12.18.23    XR (Chest) internal  2.13.24    LABS     DIABETES: HBGA1C, CREATININE, FASTING LIPIDS, MICROALBUMIN URINE, POTASSIUM, TSH, T4    THYROID: TSH, T4, CBC, THYRODLONULIN, TOTAL T3, FREE T4, CALCITONIN, CEA internal  Bmp- 10.22.24   Cbc-10.22.24    Lipid- 10.22.24   TSH/T4- 12.8.22  Cmp- 12.8.22

## 2024-11-22 ENCOUNTER — OFFICE VISIT (OUTPATIENT)
Dept: ENDOCRINOLOGY | Facility: CLINIC | Age: 54
End: 2024-11-22
Payer: COMMERCIAL

## 2024-11-22 ENCOUNTER — PRE VISIT (OUTPATIENT)
Dept: ENDOCRINOLOGY | Facility: CLINIC | Age: 54
End: 2024-11-22

## 2024-11-22 VITALS
DIASTOLIC BLOOD PRESSURE: 80 MMHG | SYSTOLIC BLOOD PRESSURE: 119 MMHG | OXYGEN SATURATION: 99 % | WEIGHT: 165 LBS | HEIGHT: 65 IN | HEART RATE: 91 BPM | BODY MASS INDEX: 27.49 KG/M2

## 2024-11-22 DIAGNOSIS — M81.0 AGE-RELATED OSTEOPOROSIS WITHOUT CURRENT PATHOLOGICAL FRACTURE: ICD-10-CM

## 2024-11-22 PROCEDURE — 99205 OFFICE O/P NEW HI 60 MIN: CPT | Performed by: INTERNAL MEDICINE

## 2024-11-22 ASSESSMENT — PAIN SCALES - GENERAL: PAINLEVEL_OUTOF10: NO PAIN (0)

## 2024-11-22 NOTE — LETTER
11/22/2024       RE: Kary Perry  [address] [address]     Dear Colleague,    Thank you for referring your patient, Kary Perry, to the [facility] [place] [facility]. Please see a copy of my visit note below.    11/21/24 10:17 AM : Appointment reminder phone call made to patient.Pt verbalized understanding     Endocrinology and Diabetes Clinic Visit     Reason for Consult: Osteoporosis   Consulting Provider: Alysha Marsh  PCP: Alysha Marsh    Subjective:   Kary Perry is a 54 year old woman seen today for a new consultation for osteoporosis.     She had a DXA scan in 12/2023 as part of routine health maintenance, and this showed osteoporosis with lowest Tscore -2.6 at the lumbar spine. She has not had any fractures except for a clavicle fracture >20 years ago.  Since she learned about the osteoporosis diagnosis she has added calcium and vitamin D supplements, and has been doing weightbearing exercise.  She is very interested in using diet, exercise, and lifestyle changes to manage osteoporosis and would like to avoid medication if possible. She recognizes that medication may be needed in the future.     Calcium intake:   Dietary: Cheese, tofu, soy milk, greens, yogurt, kefir, cottage cheese   History of gallbladder surgery, was eating low fat prior to this and also diet was low in calcium, she has added more dairy recently    Supplements: Bone supplement with calcium/mag/K/vit D containing 1000 mg calcium and a 2nd supplement with calcium 600 mg.     Vitamin D intake:   Supplements: Vitamin D 5000 units daily plus 600 units daily from above combined calcium supplement   Last vitamin D level was 50 in 2018    Osteoporosis Risk Factors:   Previous fractures: Healed clavicular fracture seen on imaging in 2005  Family history of fragility fracture in parent: Mom has osteopenia, maternal  gma and aunt had osteoporosis and kyphosis   Current smoking: No  Steroid Use: No   Rheumatoid arthritis: No  Alcohol (3 or more units per day): No (1 drink 1-2 times per month)   Other medications known to affect BMD: No  Age at menopause: Uterine ablation around 2009 so not sure of menopausal age, hot flashes were in early 50s and now these are better   Estrogen use after menopause: Vaginal estradiol   Tendency for falls: Walking in winter with dog is a risk factor   GI history: Malabsorption (IBD, Celiac, gastric bypass): None  History of kidney stones: No   History of thyroid disease: No  Physical activity: Kettle bell classes and taking proactive PT for health aging (started both after she saw dexa result)   Weight history: Stable   Height loss: maybe 1/2 inch as an adult     Medications:   Current Outpatient Medications   Medication Sig Dispense Refill     acetaminophen (TYLENOL) 325 MG tablet Take 325-650 mg by mouth every 6 hours as needed for mild pain       acyclovir (ZOVIRAX) 800 MG tablet Take 1 tablet (800 mg) by mouth every 8 hours With each cold sore outbreak. 9 tablet 3     Calcium 600 MG tablet Take 1 tablet by mouth daily Taking 1200 mg       Calcium Carb-Cholecalciferol (CALCIUM 1000 + D) 1000-20 MG-MCG TABS        Cholecalciferol (VITAMIN D-3) 5000 units TABS Take 1 tablet by mouth daily 90 tablet 3     estradiol (ESTRACE) 0.1 MG/GM vaginal cream Place 2 g vaginally three times a week. 42.5 g 4     Ibuprofen 200 MG capsule Take 200-600 mg by mouth every 4 hours as needed for fever. 100 capsule 3     MULTIVITAMIN TABS   OR 2 tabs daily       Omega-3 Fatty Acids (OMEGA-3 FISH OIL PO) Take 1,200 mg by mouth daily        scopolamine (TRANSDERM) 1 MG/3DAYS 72 hr patch Apply 1 patch to hairless area behind one ear at least 4 hours before travel.  Remove old patch and change every 3 days (72 hours). (Patient not taking: Reported on 10/22/2024) 5 patch 3     vitamin C (ASCORBIC ACID) 1000 MG TABS Take  1,000 mg by mouth 2 times daily         Past Medical History:  Past Medical History:   Diagnosis Date     Diffuse cystic mastopathy     1991 had mammogram - left breast more dense     Family history of blood disorder     related to a transfusion in her paternal grandmother     FH: non-Hodgkin's lymphoma     Mother 2009     Herpes simplex without mention of complication     herpes labialis     Infectious mononucleosis 1987     Other specified temporomandibular joint disorders      Undiagnosed cardiac murmurs     innocent murmur       Past Surgical History:   Procedure Laterality Date     BREAST SURGERY  3/2020    Breast reduction     COLONOSCOPY  1/2018     ENT SURGERY  3/1994    Tonsillectomy     GYN SURGERY  2009    Ablation     HYSTEROSCOPY  10/2004    D&C/hysteroscopy/polypectomy     HYSTEROSCOPY  05/2008    hysteroscopy/polypectomy     HYSTEROSCOPY  11/02/2009    hysteroscopy/D&C/endometrial ablation     LAPAROSCOPIC CHOLECYSTECTOMY N/A 11/1/2023    Procedure: CHOLECYSTECTOMY, LAPAROSCOPIC;  Surgeon: Emma Schroeder MD;  Location:  OR     [facility] NONSPECIFIC PROCEDURE  1993    Tonsillectomy       Patient Active Problem List   Diagnosis     Diffuse cystic mastopathy     Herpes simplex virus (HSV) infection     DUB (dysfunctional uterine bleeding)     CARDIOVASCULAR SCREENING; LDL GOAL LESS THAN 160     Vitamin D deficiency     Neck sprain     History of ovarian cyst       Family History:  Family History   Problem Relation Age of Onset     Hypertension Mother      Lipids Mother      Obesity Mother      Blood Disease Mother         low epo level     Cancer Mother 67        Lymphoma chemo and radiation     Hyperlipidemia Mother      Other Cancer Mother         Lymphoma     Other - See Comments Mother         anemia     Skin Cancer Mother      Cancer Maternal Grandmother         Melanoma/Leukemia     Osteoporosis Maternal Grandmother      C.A.D. Maternal Grandmother      Arthritis Maternal Grandmother      Obesity  "Maternal Grandmother      Skin Cancer Maternal Grandmother      Other Cancer Maternal Grandmother         Leukemia & AML     Heart Disease Paternal Grandmother      Obesity Paternal Grandmother      Blood Disease Paternal Grandmother         Used Blood Thinners     Cerebrovascular Disease Paternal Grandmother      Hypertension Paternal Grandmother      C.A.D. Paternal Grandmother      Arthritis Paternal Grandmother      Musculoskeletal Disorder Paternal Grandmother         Anti Phospho Lipid Syndrome     Cerebrovascular Disease Paternal Grandfather      Diabetes Paternal Grandfather      Heart Disease Paternal Grandfather         Heart Attack     Colon Cancer Paternal Grandfather      Alcohol/Drug Father         methamphetamine     Heart Disease Father         passed away from CHF     Substance Abuse Father      Breast Cancer No family hx of        Social History:  Social History     Tobacco Use     Smoking status: Never     Smokeless tobacco: Never   Substance Use Topics     Alcohol use: Yes     Comment: 1-2 per week       Physical Examination:  Wt Readings from Last 4 Encounters:   10/22/24 74.8 kg (165 lb)   02/13/24 73.5 kg (162 lb 1.6 oz)   11/01/23 74.1 kg (163 lb 4.8 oz)   10/19/23 73 kg (161 lb)       Estimated body mass index is 26.83 kg/m  as calculated from the following:    Height as of 10/22/24: 1.67 m (5' 5.75\").    Weight as of 10/22/24: 74.8 kg (165 lb).    BP Readings from Last 3 Encounters:   10/22/24 124/79   06/28/24 108/70   02/13/24 112/70     General: Well appearing and in no distress.   Eyes: EOMI without double vision. Anicteric.   HENT: Thyroid gland is normal size and texture.   CV: RRR, with no murmur.  Respiratory: Lungs CTA bilaterally.   GI: Soft, non tender, and non-distended.   Extremities: No peripheral edema.   Musculoskeletal: Normal spinal curvature. No tenderness with palpation over the cervical, thoracic, or lumbar spine.  Skin: There are no abdominal striae.   Neurologic: 5/5 " proximal muscle strength. No tremor.     Labs and Studies:   DEXA 12/2023: Osteoporosis with the lowest T-score -2.6 at the lumbar spine      Assessment:  Osteoporosis without current pathologic fracture.     Plan:   - Labs as below as workup for secondary cause of osteoporosis.   - She should aim for 1200 mg of calcium per day through diet and supplements combined.  We reviewed supplements and dietary intake, most days she is getting more calcium than is needed and she could reduce the number of supplements she is taking (for example, on days with 2 or more servings of dairy she can skip one supplement dose).  I also suggested that she space these out throughout the day, for better calcium absorption.  - Continue vitamin D in combined calcium supplement (600 units/day).  She can reduce the 5000 unit vitamin D supplement to once a week.  - Continue weightbearing and balance exercises  - Fall avoidance strategies were reviewed  - Repeat DEXA, if annual DEXA is covered by insurance. If not covered we will wait till 2025. She is going to check and let me know.   - We reviewed bone specific therapies for osteoporosis today, including a bisphosphonate such as Fosamax.  She is willing to start a medication if needed, but would prefer to wait on this. Our plan is to hold off on starting a medication for now, but if there has been a decrease in bone density on DEXA this would be an indication to start.    Orders Placed This Encounter   Procedures     25 Hydroxyvitamin D2 and D3     Calcium timed urine     IgA     Parathyroid Hormone Intact     Phosphorus     Tissue transglutaminase kassi IgA and IgG     TSH with free T4 reflex     Comprehensive metabolic panel       Follow up in 1 year.     61 minutes spent on the date of the encounter doing chart review, history and exam, documentation and further activities per the note.     Alanis Cleveland MD  Endocrinology and Diabetes   Office: [phone]   Clinic:  [phone]          Again, thank you for allowing me to participate in the care of your patient.      Sincerely,    Alanis Cleveland MD

## 2024-11-22 NOTE — PROGRESS NOTES
Endocrinology and Diabetes Clinic Visit     Reason for Consult: Osteoporosis   Consulting Provider: Alysha Marsh  PCP: Alysha Marsh    Subjective:   Kary Perry is a 54 year old woman seen today for a new consultation for osteoporosis.     She had a DXA scan in 12/2023 as part of routine health maintenance, and this showed osteoporosis with lowest Tscore -2.6 at the lumbar spine. She has not had any fractures except for a clavicle fracture >20 years ago.  Since she learned about the osteoporosis diagnosis she has added calcium and vitamin D supplements, and has been doing weightbearing exercise.  She is very interested in using diet, exercise, and lifestyle changes to manage osteoporosis and would like to avoid medication if possible. She recognizes that medication may be needed in the future.     Calcium intake:   Dietary: Cheese, tofu, soy milk, greens, yogurt, kefir, cottage cheese   History of gallbladder surgery, was eating low fat prior to this and also diet was low in calcium, she has added more dairy recently    Supplements: Bone supplement with calcium/mag/K/vit D containing 1000 mg calcium and a 2nd supplement with calcium 600 mg.     Vitamin D intake:   Supplements: Vitamin D 5000 units daily plus 600 units daily from above combined calcium supplement   Last vitamin D level was 50 in 2018    Osteoporosis Risk Factors:   Previous fractures: Healed clavicular fracture seen on imaging in 2005  Family history of fragility fracture in parent: Mom has osteopenia, maternal gma and aunt had osteoporosis and kyphosis   Current smoking: No  Steroid Use: No   Rheumatoid arthritis: No  Alcohol (3 or more units per day): No (1 drink 1-2 times per month)   Other medications known to affect BMD: No  Age at menopause: Uterine ablation around 2009 so not sure of menopausal age, hot flashes were in early 50s and now these are better   Estrogen use after menopause: Vaginal estradiol   Tendency for falls: Walking  in winter with dog is a risk factor   GI history: Malabsorption (IBD, Celiac, gastric bypass): None  History of kidney stones: No   History of thyroid disease: No  Physical activity: [employer] bell classes and taking proactive PT for health aging (started both after she saw dexa result)   Weight history: Stable   Height loss: maybe 1/2 inch as an adult     Medications:   Current Outpatient Medications   Medication Sig Dispense Refill    acetaminophen (TYLENOL) 325 MG tablet Take 325-650 mg by mouth every 6 hours as needed for mild pain      acyclovir (ZOVIRAX) 800 MG tablet Take 1 tablet (800 mg) by mouth every 8 hours With each cold sore outbreak. 9 tablet 3    Calcium 600 MG tablet Take 1 tablet by mouth daily Taking 1200 mg      Calcium Carb-Cholecalciferol (CALCIUM 1000 + D) 1000-20 MG-MCG TABS       Cholecalciferol (VITAMIN D-3) 5000 units TABS Take 1 tablet by mouth daily 90 tablet 3    estradiol (ESTRACE) 0.1 MG/GM vaginal cream Place 2 g vaginally three times a week. 42.5 g 4    Ibuprofen 200 MG capsule Take 200-600 mg by mouth every 4 hours as needed for fever. 100 capsule 3    MULTIVITAMIN TABS   OR 2 tabs daily      Omega-3 Fatty Acids (OMEGA-3 FISH OIL PO) Take 1,200 mg by mouth daily       scopolamine (TRANSDERM) 1 MG/3DAYS 72 hr patch Apply 1 patch to hairless area behind one ear at least 4 hours before travel.  Remove old patch and change every 3 days (72 hours). (Patient not taking: Reported on 10/22/2024) 5 patch 3    vitamin C (ASCORBIC ACID) 1000 MG TABS Take 1,000 mg by mouth 2 times daily         Past Medical History:  Past Medical History:   Diagnosis Date    Diffuse cystic mastopathy     1991 had mammogram - left breast more dense    Family history of blood disorder     related to a transfusion in her paternal grandmother    FH: non-Hodgkin's lymphoma     Mother 2009    Herpes simplex without mention of complication     herpes labialis    Infectious mononucleosis 1987    Other specified  temporomandibular joint disorders     Undiagnosed cardiac murmurs     innocent murmur       Past Surgical History:   Procedure Laterality Date    BREAST SURGERY  3/2020    Breast reduction    COLONOSCOPY  1/2018    ENT SURGERY  3/1994    Tonsillectomy    GYN SURGERY  2009    Ablation    HYSTEROSCOPY  10/2004    D&C/hysteroscopy/polypectomy    HYSTEROSCOPY  05/2008    hysteroscopy/polypectomy    HYSTEROSCOPY  11/02/2009    hysteroscopy/D&C/endometrial ablation    LAPAROSCOPIC CHOLECYSTECTOMY N/A 11/1/2023    Procedure: CHOLECYSTECTOMY, LAPAROSCOPIC;  Surgeon: Emma Schroeder MD;  Location:  OR    ZZC NONSPECIFIC PROCEDURE  1993    Tonsillectomy       Patient Active Problem List   Diagnosis    Diffuse cystic mastopathy    Herpes simplex virus (HSV) infection    DUB (dysfunctional uterine bleeding)    CARDIOVASCULAR SCREENING; LDL GOAL LESS THAN 160    Vitamin D deficiency    Neck sprain    History of ovarian cyst       Family History:  Family History   Problem Relation Age of Onset    Hypertension Mother     Lipids Mother     Obesity Mother     Blood Disease Mother         low epo level    Cancer Mother 67        Lymphoma chemo and radiation    Hyperlipidemia Mother     Other Cancer Mother         Lymphoma    Other - See Comments Mother         anemia    Skin Cancer Mother     Cancer Maternal Grandmother         Melanoma/Leukemia    Osteoporosis Maternal Grandmother     C.A.D. Maternal Grandmother     Arthritis Maternal Grandmother     Obesity Maternal Grandmother     Skin Cancer Maternal Grandmother     Other Cancer Maternal Grandmother         Leukemia & AML    Heart Disease Paternal Grandmother     Obesity Paternal Grandmother     Blood Disease Paternal Grandmother         Used Blood Thinners    Cerebrovascular Disease Paternal Grandmother     Hypertension Paternal Grandmother     C.A.D. Paternal Grandmother     Arthritis Paternal Grandmother     Musculoskeletal Disorder Paternal Grandmother         Anti  "Phospho Lipid Syndrome    Cerebrovascular Disease Paternal Grandfather     Diabetes Paternal Grandfather     Heart Disease Paternal Grandfather         Heart Attack    Colon Cancer Paternal Grandfather     Alcohol/Drug Father         methamphetamine    Heart Disease Father         passed away from CHF    Substance Abuse Father     Breast Cancer No family hx of        Social History:  Social History     Tobacco Use    Smoking status: Never    Smokeless tobacco: Never   Substance Use Topics    Alcohol use: Yes     Comment: 1-2 per week       Physical Examination:  Wt Readings from Last 4 Encounters:   10/22/24 74.8 kg (165 lb)   02/13/24 73.5 kg (162 lb 1.6 oz)   11/01/23 74.1 kg (163 lb 4.8 oz)   10/19/23 73 kg (161 lb)       Estimated body mass index is 26.83 kg/m  as calculated from the following:    Height as of 10/22/24: 1.67 m (5' 5.75\").    Weight as of 10/22/24: 74.8 kg (165 lb).    BP Readings from Last 3 Encounters:   10/22/24 124/79   06/28/24 108/70   02/13/24 112/70     General: Well appearing and in no distress.   Eyes: EOMI without double vision. Anicteric.   HENT: Thyroid gland is normal size and texture.   CV: RRR, with no murmur.  Respiratory: Lungs CTA bilaterally.   GI: Soft, non tender, and non-distended.   Extremities: No peripheral edema.   Musculoskeletal: Normal spinal curvature. No tenderness with palpation over the cervical, thoracic, or lumbar spine.  Skin: There are no abdominal striae.   Neurologic: 5/5 proximal muscle strength. No tremor.     Labs and Studies:   DEXA 12/2023: Osteoporosis with the lowest T-score -2.6 at the lumbar spine      Assessment:  Osteoporosis without current pathologic fracture.     Plan:   - Labs as below as workup for secondary cause of osteoporosis.   - She should aim for 1200 mg of calcium per day through diet and supplements combined.  We reviewed supplements and dietary intake, most days she is getting more calcium than is needed and she could reduce the " number of supplements she is taking (for example, on days with 2 or more servings of dairy she can skip one supplement dose).  I also suggested that she space these out throughout the day, for better calcium absorption.  - Continue vitamin D in combined calcium supplement (600 units/day).  She can reduce the 5000 unit vitamin D supplement to once a week.  - Continue weightbearing and balance exercises  - Fall avoidance strategies were reviewed  - Repeat DEXA, if annual DEXA is covered by insurance. If not covered we will wait till 2025. She is going to check and let me know.   - We reviewed bone specific therapies for osteoporosis today, including a bisphosphonate such as Fosamax.  She is willing to start a medication if needed, but would prefer to wait on this. Our plan is to hold off on starting a medication for now, but if there has been a decrease in bone density on DEXA this would be an indication to start.    Orders Placed This Encounter   Procedures    25 Hydroxyvitamin D2 and D3    Calcium timed urine    IgA    Parathyroid Hormone Intact    Phosphorus    Tissue transglutaminase kassi IgA and IgG    TSH with free T4 reflex    Comprehensive metabolic panel       Follow up in 1 year.     61 minutes spent on the date of the encounter doing chart review, history and exam, documentation and further activities per the note.     Alanis Cleveland MD  Endocrinology and Diabetes   Office: [phone]   Clinic: [phone]

## 2024-11-27 ENCOUNTER — OFFICE VISIT (OUTPATIENT)
Dept: FAMILY MEDICINE | Facility: CLINIC | Age: 54
End: 2024-11-27
Payer: COMMERCIAL

## 2024-11-27 ENCOUNTER — NURSE TRIAGE (OUTPATIENT)
Dept: FAMILY MEDICINE | Facility: CLINIC | Age: 54
End: 2024-11-27

## 2024-11-27 ENCOUNTER — ANCILLARY PROCEDURE (OUTPATIENT)
Dept: GENERAL RADIOLOGY | Facility: CLINIC | Age: 54
End: 2024-11-27
Payer: COMMERCIAL

## 2024-11-27 VITALS
SYSTOLIC BLOOD PRESSURE: 91 MMHG | WEIGHT: 162.7 LBS | TEMPERATURE: 98.3 F | RESPIRATION RATE: 15 BRPM | DIASTOLIC BLOOD PRESSURE: 63 MMHG | OXYGEN SATURATION: 100 % | BODY MASS INDEX: 27.11 KG/M2 | HEART RATE: 86 BPM | HEIGHT: 65 IN

## 2024-11-27 DIAGNOSIS — R05.3 PERSISTENT COUGH: Primary | ICD-10-CM

## 2024-11-27 DIAGNOSIS — K21.00 GASTROESOPHAGEAL REFLUX DISEASE WITH ESOPHAGITIS WITHOUT HEMORRHAGE: ICD-10-CM

## 2024-11-27 DIAGNOSIS — R05.3 PERSISTENT COUGH: ICD-10-CM

## 2024-11-27 PROCEDURE — 71046 X-RAY EXAM CHEST 2 VIEWS: CPT | Mod: TC | Performed by: RADIOLOGY

## 2024-11-27 RX ORDER — BENZONATATE 100 MG/1
100 CAPSULE ORAL 3 TIMES DAILY PRN
Qty: 30 CAPSULE | Refills: 0 | Status: SHIPPED | OUTPATIENT
Start: 2024-11-27

## 2024-11-27 RX ORDER — FAMOTIDINE 20 MG/1
20 TABLET, FILM COATED ORAL 2 TIMES DAILY
Qty: 60 TABLET | Refills: 0 | Status: SHIPPED | OUTPATIENT
Start: 2024-11-27

## 2024-11-27 ASSESSMENT — PAIN SCALES - GENERAL: PAINLEVEL_OUTOF10: NO PAIN (0)

## 2024-11-27 ASSESSMENT — ENCOUNTER SYMPTOMS: COUGH: 1

## 2024-11-27 NOTE — PATIENT INSTRUCTIONS
It seems most likely that your symptoms are related to ongoing airway irritation from uncontrolled reflux.  I think we should get a chest x-ray today to make sure that we are not dealing with any infection in your lungs that would require the need for antibiotics.    I have also prescribed a medication called Tessalon Perels. This is a cough medication that can be useful to prevent coughing jags overnight, and help you get a good nights rest. This medication can be used during the day, but it can often make people a bit drowsy, so I would encourage you to take it in the evening for the first time to determine how your body reacts to the medication.    To reduce acid production and start healing the stomach, take Pepcid 20mg twice daily as needed. This medication must be taken 30 MINUTES BEFORE a meal.  Your symptoms should improve greatly over the next 48-72 hours.  For your ongoing symptoms, please use Maalox every 4 hours as needed.  I recommend you follow up with your provider in two weeks if symptoms have not completely resolved.      Please seek immediate medical attention with symptoms including severe worsening of pain, difficulty swallowing, vomiting, bloody vomit. Please follow up in clinic as I have recommended.  If your symptoms worsen prior to your follow up appointment, do not hesitate to return to clinic or go to an Emergency Department

## 2024-11-27 NOTE — TELEPHONE ENCOUNTER
"Care advice:  GO TO OFFICE OR VIDEO VISIT NOW    Disposition: No available provider appt at assigned clinic. Warm transfer patient to central scheduling for assistance.  Advised and patient agreed to go to the  urgent care if no appt available else where.    Reason for Disposition   Wheezing is present    Additional Information   Negative: Bluish (or gray) lips or face   Negative: SEVERE difficulty breathing (e.g., struggling for each breath, speaks in single words)   Negative: Rapid onset of cough and has hives   Negative: Coughing started suddenly after medicine, an allergic food or bee sting   Negative: Difficulty breathing after exposure to flames, smoke, or fumes   Negative: Sounds like a life-threatening emergency to the triager   Negative: Previous asthma attacks and this feels like asthma attack   Negative: Dry cough (non-productive; no sputum or minimal clear sputum) and within 14 days of COVID-19 Exposure   Negative: MODERATE difficulty breathing (e.g., speaks in phrases, SOB even at rest, pulse 100-120) and still present when not coughing   Negative: Chest pain present when not coughing   Negative: Passed out (e.g., fainted, lost consciousness, blacked out and was not responding)   Negative: Patient sounds very sick or weak to the triager    Answer Assessment - Initial Assessment Questions  1. ONSET: \"When did the cough begin?\"       Coughing about a week ago    2. SEVERITY: \"How bad is the cough today?\"       Coughing remains the same.  Hoarseness improved.    3. SPUTUM: \"Describe the color of your sputum\" (e.g., none, dry cough; clear, white, yellow, green)      Wetness in the lung but cannot cough anything up.    4. HEMOPTYSIS: \"Are you coughing up any blood?\" If Yes, ask: \"How much?\" (e.g., flecks, streaks, tablespoons, etc.)      Denied.    5. DIFFICULTY BREATHING: \"Are you having difficulty breathing?\" If Yes, ask: \"How bad is it?\" (e.g., mild, moderate, severe)       To some degree.  Affects the " "work of breathing more at night.  Have to use extra pillow to improve sleep.    6. FEVER: \"Do you have a fever?\" If Yes, ask: \"What is your temperature, how was it measured, and when did it start?\"      Claming skin with sweat but no chills.    7. CARDIAC HISTORY: \"Do you have any history of heart disease?\" (e.g., heart attack, congestive heart failure)       Mild murmur.    8. LUNG HISTORY: \"Do you have any history of lung disease?\"  (e.g., pulmonary embolus, asthma, emphysema)      Denied.    9. PE RISK FACTORS: \"Do you have a history of blood clots?\" (or: recent major surgery, recent prolonged travel, bedridden)      Denied other than gallbladder surgery, 2023    10. OTHER SYMPTOMS: \"Do you have any other symptoms?\" (e.g., runny nose, wheezing, chest pain)        Chest discomfort from coughing, acid reflux symptoms to start with.     11. PREGNANCY: \"Is there any chance you are pregnant?\" \"When was your last menstrual period?\"        N/a    12. TRAVEL: \"Have you traveled out of the country in the last month?\" (e.g., travel history, exposures)        Denied.    Protocols used: Cough-A-[place]    "

## 2024-11-27 NOTE — PROGRESS NOTES
Assessment & Plan     (R05.3) Persistent cough  (primary encounter diagnosis)  Comment: Acute and stable. No signs of respiratory distress, vital signs stable, and no red flag signs requiring immediate need for medical attention.  Patient describes that symptoms presented after eating a red sauce, and she is unsure whether or not aspiration occurred.  Considering ongoing nature of coughing, will update chest x-ray today to ensure that we are not dealing with any infectious process in the lungs that would require the need for antibiotic treatment.  More likely to believe that this is related to uncontrolled acid reflux.  Will begin treating cough with Tessalon Perles to ensure that patient can sleep at night, and manage the GERD with Pepcid.  Will add on an antibiotic course if x-ray indicates this is necessary.  Would likely utilize doxycycline twice daily for 10 days if x-ray noted acute infiltrate. Offered education on medications including appropriate dosing, possible side effects, and possible adverse effects.  Education given on return to clinic instructions as well as alarm signs that would require the need for immediate medical attention.  Patient attested to understanding.  Plan: XR Chest 2 Views, benzonatate (TESSALON) 100 MG        capsule    (K21.00) Gastroesophageal reflux disease with esophagitis without hemorrhage  Comment: Chronic with acute flare.  No concerns for acute airway, hemorrhage, or severe esophagitis that would warrant the need for immediate referral or medical attention.  Seems that ongoing reflux likely related to diet are leading to increased acid production and ongoing airway irritation that are causing ongoing cough.  Patient has not yet taken any omeprazole or Pepcid.  Will trial Pepcid twice daily until symptoms are more controlled, as patient would like to avoid omeprazole within when possible. Offered education on medications including appropriate dosing, possible side effects,  and possible adverse effects.  Education given on return to clinic instructions as well as alarm signs that would require the need for immediate medical attention.  Patient attested to understanding.  Plan: famotidine (PEPCID) 20 MG tablet      This progress note has been dictated, with use of voice recognition software. Any grammatical, typographical, or context errors are unintentional and inherent to use of voice recognition software.     Ordering of each unique test  Prescription drug management  I spent a total of 15 minutes on the day of the visit.   Time spent by me today doing chart review, history and exam, documentation and further activities per the note    FUTURE APPOINTMENTS:       - Follow-up visit in 1 to 2 weeks if symptoms worsen or do not improve       - Follow-up for annual visit or as needed  Patient Instructions   It seems most likely that your symptoms are related to ongoing airway irritation from uncontrolled reflux.  I think we should get a chest x-ray today to make sure that we are not dealing with any infection in your lungs that would require the need for antibiotics.    I have also prescribed a medication called Tessalon Perels. This is a cough medication that can be useful to prevent coughing jags overnight, and help you get a good nights rest. This medication can be used during the day, but it can often make people a bit drowsy, so I would encourage you to take it in the evening for the first time to determine how your body reacts to the medication.    To reduce acid production and start healing the stomach, take Pepcid 20mg twice daily as needed. This medication must be taken 30 MINUTES BEFORE a meal.  Your symptoms should improve greatly over the next 48-72 hours.  For your ongoing symptoms, please use Maalox every 4 hours as needed.  I recommend you follow up with your provider in two weeks if symptoms have not completely resolved.      Please seek immediate medical attention with  symptoms including severe worsening of pain, difficulty swallowing, vomiting, bloody vomit. Please follow up in clinic as I have recommended.  If your symptoms worsen prior to your follow up appointment, do not hesitate to return to clinic or go to an Emergency Department      Subjective   Radha is a 54 year old, presenting for the following health issues:  Chest Pain, Excessive Sweating, Cough, and Recheck Medication (Pt reports that she's here to discuss symptoms of a cough, chest pain, fever for 5-6 days.)        11/27/2024    10:20 AM   Additional Questions   Roomed by samantha   Accompanied by mike         11/27/2024    10:20 AM   Patient Reported Additional Medications   Patient reports taking the following new medications none     History of Present Illness       Reason for visit:  Persistent cough mostlikely from acid reflux  Symptom onset:  3-7 days ago  Symptom intensity:  Moderate  Symptom progression:  Staying the same  Had these symptoms before:  No  What makes it worse:  Acid irritating foods  What makes it better:  Cough drops but nothing controls the cough   She is taking medications regularly.  Radha is a 54-year-old female with a past medical history significant for vitamin D deficiency, neck sprain, dysfunctional uterine bleeding, and HSV who presents today with concerns for ongoing coughing.  Patient reports that approximately 1 week ago she ate some red sauce [place], and thinks that the sauce may have gone down the wrong way.  Since this time she has been having some mild burning in her chest initially that has since improved if not resolved, but what is mainly brought her in is a persistent ongoing cough.  She was initially having some headaches with mild night sweats without fever, and has continued to have a very hoarse voice.  She notes that she feels better, and would not necessarily describe her symptoms as illness, but the ongoing coughing is preventing her from sleeping, so she wants to  "make sure that everything is evaluated.  She does appreciate some mild end expiratory wheezing with deep breathing, and notes that the coughing is mainly enticed with ongoing coughing and deep breathing as well.  She has tried some over-the-counter remedies for both coughing and acid reflux that have been unhelpful.  She tested herself for COVID which was negative.  She declines any shortness of breath or persistent chest pain, and is not experiencing a sore throat, ear pain, sinus pressure, blurry or double vision, lightheadedness or dizziness, or GI upset.  She is not coughing anything up.  She did have an EGD done a number of years ago which did appreciate some erosive changes in her airway.  Did put her on 6 weeks of omeprazole, and she notes that it was extremely difficult to wean off of this after, so she has completely avoided any acid reducing medication since that time.      Review of Systems  Constitutional, HEENT, cardiovascular, pulmonary, gi and gu systems are negative, except as otherwise noted.      Objective    BP 91/63 (BP Location: Left arm, Patient Position: Sitting, Cuff Size: Adult Regular)   Pulse 86   Temp 98.3  F (36.8  C) (Axillary)   Resp 15   Ht 1.651 m (5' 5\")   Wt 73.8 kg (162 lb 11.2 oz)   LMP  (LMP Unknown)   SpO2 100%   Breastfeeding No   BMI 27.07 kg/m    Body mass index is 27.07 kg/m .  Physical Exam   GENERAL: alert and no distress  EYES: Eyes grossly normal to inspection, PERRL and conjunctivae and sclerae normal  HENT: ear canals and TM's normal, nose and mouth without ulcers or lesions  NECK: no adenopathy, no asymmetry, masses, or scars  RESP: Easy rate, depth, and work of breathing without use of accessory muscles.  Dry persistent cough.  Lungs clear to auscultation - no rales, rhonchi or wheezes  CV: regular rate and rhythm, normal S1 S2, no S3 or S4, no murmur, click or rub, no peripheral edema  ABDOMEN: soft, nontender, no hepatosplenomegaly, no masses and bowel " sounds normal  MS: no gross musculoskeletal defects noted, no edema    Martha Garrett DNP FNP-C  Family Nurse Practitioner - Same Day Provider  [facility]        Signed Electronically by: RONY Jaimes CNP

## 2024-11-28 ENCOUNTER — NURSE TRIAGE (OUTPATIENT)
Dept: NURSING | Facility: CLINIC | Age: 54
End: 2024-11-28
Payer: COMMERCIAL

## 2024-11-28 DIAGNOSIS — J18.9 PNEUMONIA: Primary | ICD-10-CM

## 2024-11-28 DIAGNOSIS — J18.9 PNEUMONIA OF RIGHT MIDDLE LOBE DUE TO INFECTIOUS ORGANISM: Primary | ICD-10-CM

## 2024-11-28 RX ORDER — DOXYCYCLINE 100 MG/1
100 CAPSULE ORAL 2 TIMES DAILY
Qty: 20 CAPSULE | Refills: 0 | Status: SHIPPED | OUTPATIENT
Start: 2024-11-28 | End: 2024-12-08

## 2024-11-28 NOTE — TELEPHONE ENCOUNTER
[place] [facility] yesterday: My Chart results showed positive for pneumonia. RX antibiotic sent to [facility] that is closed today. She is requesting it to be sent to: [facility] [address] that is open: [phone].   Ordered by Martha Garrett CNP: Doxycycline Hyclate 100 mg oral 2 times a day, x 10 days.    Retransmitted order to [facility] pharmacy @ 10:07am. Patient intends to  RX today.    Fabiola Calloway RN Triage Nurse Advisor 10:08 AM 11/28/2024  Reason for Disposition   [1] Prescription prescribed recently is not at pharmacy AND [2] triager has access to patient's EMR AND [3] prescription is recorded in the EMR    Additional Information   Negative: [1] Intentional drug overdose AND [2] suicidal thoughts or ideas   Negative: Drug overdose and triager unable to answer question   Negative: Caller requesting a renewal or refill of a medicine patient is currently taking   Negative: Caller requesting information unrelated to medicine   Negative: Caller requesting information about COVID-19 Vaccine   Negative: Caller requesting information about Emergency Contraception   Negative: Caller requesting information about Combined Birth Control Pills   Negative: Caller requesting information about Progestin Birth Control Pills   Negative: Caller requesting information about Post-Op pain or medicines   Negative: Caller requesting a prescription antibiotic (such as Penicillin) for Strep throat and has a positive culture result   Negative: Caller requesting a prescription anti-viral med (such as Tamiflu) and has influenza (flu) symptoms   Negative: Immunization reaction suspected   Negative: Rash while taking a medicine or within 3 days of stopping it   Negative: [1] Asthma and [2] having symptoms of asthma (cough, wheezing, etc.)   Negative: [1] Symptom of illness (e.g., headache, abdominal pain, earache, vomiting) AND [2] more than mild   Negative: Breastfeeding questions about mother's medicines and diet   Negative: MORE THAN  A DOUBLE DOSE of a prescription or over-the-counter (OTC) drug   Negative: [1] DOUBLE DOSE (an extra dose or lesser amount) of prescription drug AND [2] any symptoms (e.g., dizziness, nausea, pain, sleepiness)   Negative: [1] DOUBLE DOSE (an extra dose or lesser amount) of over-the-counter (OTC) drug AND [2] any symptoms (e.g., dizziness, nausea, pain, sleepiness)   Negative: Took another person's prescription drug   Negative: [1] DOUBLE DOSE (an extra dose or lesser amount) of prescription drug AND [2] NO symptoms  (Exception: A double dose of antibiotics.)   Negative: Diabetes drug error or overdose (e.g., took wrong type of insulin or took extra dose)   Negative: [1] Prescription not at pharmacy AND [2] was prescribed by PCP recently (Exception: Triager has access to EMR and prescription is recorded there. Go to Home Care and confirm for pharmacy.)   Negative: [1] Pharmacy calling with prescription question AND [2] triager unable to answer question   Negative: [1] Caller has URGENT medicine question about med that PCP or specialist prescribed AND [2] triager unable to answer question   Negative: Medicine patch causing local rash or itching   Negative: [1] Caller has medicine question about med NOT prescribed by PCP AND [2] triager unable to answer question (e.g., compatibility with other med, storage)   Negative: Prescription request for new medicine (not a refill)   Negative: [1] Caller has NON-URGENT medicine question about med that PCP prescribed AND [2] triager unable to answer question   Negative: Caller wants to use a complementary or alternative medicine    Protocols used: Medication Question Call-A-

## 2024-12-12 ENCOUNTER — IMMUNIZATION (OUTPATIENT)
Dept: INTERNAL MEDICINE | Facility: CLINIC | Age: 54
End: 2024-12-12
Payer: COMMERCIAL

## 2024-12-23 ENCOUNTER — OFFICE VISIT (OUTPATIENT)
Dept: FAMILY MEDICINE | Facility: CLINIC | Age: 54
End: 2024-12-23
Payer: COMMERCIAL

## 2024-12-23 ENCOUNTER — LAB (OUTPATIENT)
Dept: LAB | Facility: CLINIC | Age: 54
End: 2024-12-23
Payer: COMMERCIAL

## 2024-12-23 VITALS
RESPIRATION RATE: 18 BRPM | BODY MASS INDEX: 26.82 KG/M2 | DIASTOLIC BLOOD PRESSURE: 76 MMHG | WEIGHT: 161 LBS | HEART RATE: 72 BPM | HEIGHT: 65 IN | SYSTOLIC BLOOD PRESSURE: 128 MMHG | OXYGEN SATURATION: 100 % | TEMPERATURE: 97.1 F

## 2024-12-23 DIAGNOSIS — K21.00 GASTROESOPHAGEAL REFLUX DISEASE WITH ESOPHAGITIS WITHOUT HEMORRHAGE: ICD-10-CM

## 2024-12-23 DIAGNOSIS — M81.0 AGE-RELATED OSTEOPOROSIS WITHOUT CURRENT PATHOLOGICAL FRACTURE: ICD-10-CM

## 2024-12-23 LAB
ALBUMIN SERPL BCG-MCNC: 4.4 G/DL (ref 3.5–5.2)
ALP SERPL-CCNC: 80 U/L (ref 40–150)
ALT SERPL W P-5'-P-CCNC: 24 U/L (ref 0–50)
ANION GAP SERPL CALCULATED.3IONS-SCNC: 10 MMOL/L (ref 7–15)
AST SERPL W P-5'-P-CCNC: 27 U/L (ref 0–45)
BILIRUB SERPL-MCNC: 0.6 MG/DL
BUN SERPL-MCNC: 11.8 MG/DL (ref 6–20)
CALCIUM SERPL-MCNC: 9.6 MG/DL (ref 8.8–10.4)
CHLORIDE SERPL-SCNC: 105 MMOL/L (ref 98–107)
CREAT SERPL-MCNC: 0.66 MG/DL (ref 0.51–0.95)
EGFRCR SERPLBLD CKD-EPI 2021: >90 ML/MIN/1.73M2
GLUCOSE SERPL-MCNC: 102 MG/DL (ref 70–99)
HCO3 SERPL-SCNC: 24 MMOL/L (ref 22–29)
PHOSPHATE SERPL-MCNC: 3.6 MG/DL (ref 2.5–4.5)
POTASSIUM SERPL-SCNC: 4.2 MMOL/L (ref 3.4–5.3)
PROT SERPL-MCNC: 7.1 G/DL (ref 6.4–8.3)
PTH-INTACT SERPL-MCNC: 36 PG/ML (ref 15–65)
SODIUM SERPL-SCNC: 139 MMOL/L (ref 135–145)
TSH SERPL DL<=0.005 MIU/L-ACNC: 1.38 UIU/ML (ref 0.3–4.2)

## 2024-12-23 PROCEDURE — 83970 ASSAY OF PARATHORMONE: CPT

## 2024-12-23 PROCEDURE — 82784 ASSAY IGA/IGD/IGG/IGM EACH: CPT

## 2024-12-23 PROCEDURE — 84443 ASSAY THYROID STIM HORMONE: CPT

## 2024-12-23 PROCEDURE — 80053 COMPREHEN METABOLIC PANEL: CPT

## 2024-12-23 PROCEDURE — 86364 TISS TRNSGLTMNASE EA IG CLAS: CPT

## 2024-12-23 PROCEDURE — 99213 OFFICE O/P EST LOW 20 MIN: CPT | Performed by: STUDENT IN AN ORGANIZED HEALTH CARE EDUCATION/TRAINING PROGRAM

## 2024-12-23 PROCEDURE — 36415 COLL VENOUS BLD VENIPUNCTURE: CPT

## 2024-12-23 PROCEDURE — 82306 VITAMIN D 25 HYDROXY: CPT

## 2024-12-23 PROCEDURE — 84100 ASSAY OF PHOSPHORUS: CPT

## 2024-12-23 RX ORDER — PANTOPRAZOLE SODIUM 20 MG/1
20 TABLET, DELAYED RELEASE ORAL 2 TIMES DAILY
Qty: 60 TABLET | Refills: 1 | Status: SHIPPED | OUTPATIENT
Start: 2024-12-23

## 2024-12-23 RX ORDER — FAMOTIDINE 20 MG/1
20 TABLET, FILM COATED ORAL 2 TIMES DAILY
Qty: 180 TABLET | Refills: 0 | Status: SHIPPED | OUTPATIENT
Start: 2024-12-23

## 2024-12-23 ASSESSMENT — PAIN SCALES - GENERAL: PAINLEVEL_OUTOF10: NO PAIN (0)

## 2024-12-23 NOTE — PATIENT INSTRUCTIONS
Let's continue pepcid 20mg twice/day. Take before breakfast and dinner. Do this for 2-4 weeks.  If no improvement then, start pantoprazole.  Start with 20mg daily before breakfast.  If no improvement you can increase to 20mg twice daily before breakfast and dinner.  Try this for a 2-4 week trial  Can add on pepto, tums   If no improvement, let me or your PCP know and we can refer you to GI or order an endoscopy      Avoid:  Spicy  Fried foods  Citrus  Acidic foods  Tomato based foods  Chocolate/dark chocolate  Alcohol (wine)  Avoid eating a few hours before bed  Continue sleeping upright with the wedge      Dr. Ramos

## 2024-12-23 NOTE — PROGRESS NOTES
"  Assessment & Plan     Gastroesophageal reflux disease with esophagitis without hemorrhage  History of GERD and esophagitis noted on remote EGD. Started having worsening cough a month ago-found to have pneumonia. Was treated for this & improved. However, still having reflux symptoms.    Plan:  Continue pepcid 20mg twice/day. Do this for 2-4 weeks.  If no improvement then, start pantoprazole  Start with 20mg daily before breakfast.  If no improvement you can increase to 20mg twice daily before breakfast and dinner  Try this for a 2-4 week trial  Can add on pepto, tums PRN  Continue to reduce portion sizes  Counseled on foods to avoid  If no improvement, let me or your PCP know and we can refer you to GI or order an endoscopy    - famotidine (PEPCID) 20 MG tablet  Dispense: 180 tablet; Refill: 0  - pantoprazole (PROTONIX) 20 MG EC tablet  Dispense: 60 tablet; Refill: 1      Ovi Garcia is a 54 year old, presenting for the following health issues:  acid reflux issues      12/23/2024     4:24 PM   Additional Questions   Roomed by Casie dominguez   Accompanied by self     History of Present Illness       Reason for visit:  Acid reflux/GERD issues    She eats 4 or more servings of fruits and vegetables daily.She consumes 1 sweetened beverage(s) daily.She exercises with enough effort to increase her heart rate 30 to 60 minutes per day.  She exercises with enough effort to increase her heart rate 4 days per week.   She is taking medications regularly.     GERD  -pepcid 20mg BID    Had pneumonia-treated with doxycycline & tessalon  Improved  Now having GERD symptoms  Was given pepcid BID  Years ago, went to [facility] and had upper endoscopy had erosions (did for 6 weeks-hard to get off of)  Working on reducing portion sizes        Objective    /76 (BP Location: Right arm, Patient Position: Sitting, Cuff Size: Adult Regular)   Pulse 72   Temp 97.1  F (36.2  C) (Temporal)   Resp 18   Ht 1.641 m (5' 4.6\")   Wt 73 kg (161 " lb)   SpO2 100%   BMI 27.12 kg/m    Body mass index is 27.12 kg/m .    Physical Exam  Constitutional:       General: She is not in acute distress.     Appearance: She is not ill-appearing.   Pulmonary:      Effort: Pulmonary effort is normal.   Neurological:      General: No focal deficit present.      Mental Status: She is alert.            Signed Electronically by: Román Ramos DO

## 2024-12-24 LAB
CALCIUM 24H UR-MRATE: 0.39 G/SPEC (ref 0.1–0.3)
CALCIUM UR-MCNC: 9.8 MG/DL
COLLECT DURATION TIME UR: 24 H
IGA SERPL-MCNC: 132 MG/DL (ref 84–499)
SPECIMEN VOL UR: 4000 ML
TTG IGA SER-ACNC: 0.6 U/ML
TTG IGG SER-ACNC: <0.6 U/ML

## 2024-12-24 PROCEDURE — 81050 URINALYSIS VOLUME MEASURE: CPT

## 2024-12-24 PROCEDURE — 82340 ASSAY OF CALCIUM IN URINE: CPT

## 2024-12-25 LAB
DEPRECATED CALCIDIOL+CALCIFEROL SERPL-MC: <49 UG/L (ref 20–75)
VITAMIN D2 SERPL-MCNC: <5 UG/L
VITAMIN D3 SERPL-MCNC: 44 UG/L

## 2025-01-04 DIAGNOSIS — K21.00 GASTROESOPHAGEAL REFLUX DISEASE WITH ESOPHAGITIS WITHOUT HEMORRHAGE: ICD-10-CM

## 2025-01-04 RX ORDER — PANTOPRAZOLE SODIUM 20 MG/1
20 TABLET, DELAYED RELEASE ORAL 2 TIMES DAILY
Qty: 180 TABLET | Refills: 1 | OUTPATIENT
Start: 2025-01-04

## 2025-02-02 ENCOUNTER — HEALTH MAINTENANCE LETTER (OUTPATIENT)
Age: 55
End: 2025-02-02

## 2025-05-19 ENCOUNTER — ANCILLARY PROCEDURE (OUTPATIENT)
Dept: MAMMOGRAPHY | Facility: CLINIC | Age: 55
End: 2025-05-19
Attending: NURSE PRACTITIONER
Payer: COMMERCIAL

## 2025-05-19 DIAGNOSIS — Z12.31 VISIT FOR SCREENING MAMMOGRAM: ICD-10-CM

## 2025-05-19 PROCEDURE — 77067 SCR MAMMO BI INCL CAD: CPT | Mod: TC | Performed by: RADIOLOGY

## 2025-05-19 PROCEDURE — 77063 BREAST TOMOSYNTHESIS BI: CPT | Mod: TC | Performed by: RADIOLOGY

## 2025-05-30 ENCOUNTER — NURSE TRIAGE (OUTPATIENT)
Dept: FAMILY MEDICINE | Facility: CLINIC | Age: 55
End: 2025-05-30
Payer: COMMERCIAL

## 2025-05-30 DIAGNOSIS — B00.1 COLD SORE: ICD-10-CM

## 2025-05-30 RX ORDER — ACYCLOVIR 800 MG/1
800 TABLET ORAL EVERY 8 HOURS
Qty: 9 TABLET | Refills: 3 | Status: CANCELLED | OUTPATIENT
Start: 2025-05-30

## 2025-06-02 NOTE — TELEPHONE ENCOUNTER
Patient has cold sore at base of nose  Treated with three days of TID acyclovir and does not seem to be responding  Patient would like to be seen by PCP to assess in-person and discuss any updates to her current medication/tx plan given lack of efficacy.    Scheduled for 6/5 with PCP at 1105. Patient advised to call back to be seen sooner should she experience any new or worsening sx. The patient indicates understanding of these issues and agrees with the plan.    RAUL Fournier, BSN, PHN, AMB-BC (she/her)  [facility] RN    Reason for Disposition   Sores on the eye, eyelids or tip of nose    Protocols used: Cold Sores (Fever Blisters)-A-OH

## 2025-06-05 ENCOUNTER — OFFICE VISIT (OUTPATIENT)
Dept: FAMILY MEDICINE | Facility: CLINIC | Age: 55
End: 2025-06-05
Payer: COMMERCIAL

## 2025-06-05 VITALS
BODY MASS INDEX: 27.88 KG/M2 | SYSTOLIC BLOOD PRESSURE: 102 MMHG | OXYGEN SATURATION: 100 % | WEIGHT: 167.3 LBS | HEIGHT: 65 IN | TEMPERATURE: 97.3 F | HEART RATE: 72 BPM | RESPIRATION RATE: 16 BRPM | DIASTOLIC BLOOD PRESSURE: 67 MMHG

## 2025-06-05 DIAGNOSIS — J34.89 NOSE IRRITATION: Primary | ICD-10-CM

## 2025-06-05 DIAGNOSIS — T75.3XXA MOTION SICKNESS, INITIAL ENCOUNTER: ICD-10-CM

## 2025-06-05 DIAGNOSIS — B00.1 COLD SORE: ICD-10-CM

## 2025-06-05 RX ORDER — VALACYCLOVIR HYDROCHLORIDE 1 G/1
2000 TABLET, FILM COATED ORAL 2 TIMES DAILY
Qty: 4 TABLET | Refills: 3 | Status: SHIPPED | OUTPATIENT
Start: 2025-06-05 | End: 2025-06-06

## 2025-06-05 RX ORDER — SCOPOLAMINE 1 MG/3D
PATCH, EXTENDED RELEASE TRANSDERMAL
Qty: 5 PATCH | Refills: 3 | Status: SHIPPED | OUTPATIENT
Start: 2025-06-05

## 2025-06-05 ASSESSMENT — PAIN SCALES - GENERAL: PAINLEVEL_OUTOF10: NO PAIN (0)

## 2025-06-05 NOTE — NURSING NOTE
Prior to immunization administration, verified patients identity using patient s name and date of birth. Please see Immunization Activity for additional information.     Screening Questionnaire for Adult Immunization    Are you sick today?   No   Do you have allergies to medications, food, a vaccine component or latex?   Yes   Have you ever had a serious reaction after receiving a vaccination?   No   Do you have a long-term health problem with heart, lung, kidney, or metabolic disease (e.g., diabetes), asthma, a blood disorder, no spleen, complement component deficiency, a cochlear implant, or a spinal fluid leak?  Are you on long-term aspirin therapy?   No   Do you have cancer, leukemia, HIV/AIDS, or any other immune system problem?   No   Do you have a parent, brother, or sister with an immune system problem?   No   In the past 3 months, have you taken medications that affect  your immune system, such as prednisone, other steroids, or anticancer drugs; drugs for the treatment of rheumatoid arthritis, Crohn s disease, or psoriasis; or have you had radiation treatments?   No   Have you had a seizure, or a brain or other nervous system problem?   No   During the past year, have you received a transfusion of blood or blood    products, or been given immune (gamma) globulin or antiviral drug?   No   For women: Are you pregnant or is there a chance you could become       pregnant during the next month?   No   Have you received any vaccinations in the past 4 weeks?   No     Immunization questionnaire answers were all negative.      Patient instructed to remain in clinic for 15 minutes afterwards, and to report any adverse reactions.     Screening performed by Danielle Taylor MA on 6/5/2025 at 11:39 AM.

## 2025-06-05 NOTE — PROGRESS NOTES
"  Assessment & Plan     Nose irritation  Overall, improving.  Could have been a cold sore vs dermatitis from rhinorrhea.  She can trial hydrocortisone and continue antibiotic ointment.  Follow up if worsens or persists.      Cold sore  Switching to valcyclovir for easier dosing.  Continue PRN at the first symptoms of a cold sore.    - valACYclovir (VALTREX) 1000 mg tablet; Take 2 tablets (2,000 mg) by mouth 2 times daily for 1 day.    Motion sickness, initial encounter  Uses as needed for travel.    - scopolamine (TRANSDERM) 1 MG/3DAYS 72 hr patch; Apply 1 patch to hairless area behind one ear at least 4 hours before travel.  Remove old patch and change every 3 days (72 hours).    The longitudinal plan of care for the diagnosis(es)/condition(s) as documented were addressed during this visit. Due to the added complexity in care, I will continue to support Radha in the subsequent management and with ongoing continuity of care.      BMI  Estimated body mass index is 27.84 kg/m  as calculated from the following:    Height as of this encounter: 1.651 m (5' 5\").    Weight as of this encounter: 75.9 kg (167 lb 4.8 oz).             Subjective   Radha is a 55 year old, presenting for the following health issues:  Mouth Lesions (Base of nose/Acyclovir is not responding to it/Improved since TE on 6/30) and Recheck Medication        6/5/2025    11:00 AM   Additional Questions   Roomed by Yesenia MAGUIRE     Mouth Lesions    History of Present Illness       Reason for visit:  Skin irritation / cold sore?  Symptom onset:  1-2 weeks ago  Symptoms include:  Irritated skin by my nose-itching-nerve tinging  Symptom intensity:  Moderate  Symptom progression:  Improving  Had these symptoms before:  No   She is taking medications regularly.        Eyes irritated with air quality  Would like pneumonia vaccine    Stomach upset since being treated with doxycycline in November    Ongoing nasal drip over the winter.    Felt like she developed a " "cold sore under nose.  Was red, swollen, irritated.  Took acyclovir treatment.  Did a topical as well.  Still has the lesion.  Also tried neosporin.      Feels similar to when she had shingles, but overall better.              Objective    /67 (BP Location: Right arm, Patient Position: Sitting, Cuff Size: Adult Regular)   Pulse 72   Temp 97.3  F (36.3  C) (Temporal)   Resp 16   Ht 1.651 m (5' 5\")   Wt 75.9 kg (167 lb 4.8 oz)   SpO2 100%   BMI 27.84 kg/m    Body mass index is 27.84 kg/m .  Physical Exam   Small area of redness under right nare, no blistering or discharge.              Signed Electronically by: Alysha Marsh DNP    "

## 2025-06-29 ENCOUNTER — E-VISIT (OUTPATIENT)
Dept: FAMILY MEDICINE | Facility: CLINIC | Age: 55
End: 2025-06-29
Payer: COMMERCIAL

## 2025-06-29 ENCOUNTER — MYC MEDICAL ADVICE (OUTPATIENT)
Dept: FAMILY MEDICINE | Facility: CLINIC | Age: 55
End: 2025-06-29

## 2025-06-29 DIAGNOSIS — Z53.9 PROCEDURE NOT CARRIED OUT: Primary | ICD-10-CM

## 2025-06-29 PROCEDURE — 99207 PR NON-BILLABLE SERV PER CHARTING: CPT | Performed by: FAMILY MEDICINE

## 2025-07-01 NOTE — TELEPHONE ENCOUNTER
Writer replied to patient via [email or web address].  RAUL Fournier BSN, PHN, AMB-BC (she/her)  [facility] RN

## 2025-07-03 ENCOUNTER — TELEPHONE (OUTPATIENT)
Dept: FAMILY MEDICINE | Facility: CLINIC | Age: 55
End: 2025-07-03
Payer: COMMERCIAL

## 2025-07-03 NOTE — TELEPHONE ENCOUNTER
Writer called and left message on patient's voicemail to call back and speak with a triage nurse.    TRIAGE patient due to symptoms of UTI/back pain.   Message from Alysha Marsh NP:  Please triage. Otherwise, if she feels she can wait- we could offer first available next week to be evaluated.     Andie Maloney, LEIGH ANNN RN  [facility]

## 2025-07-03 NOTE — PATIENT INSTRUCTIONS
Dear Radha,    We are sorry you are not feeling well. Based on the responses you provided, it is recommended that you be seen in-person in clinic so we can better evaluate your symptoms. Please schedule this visit in [facility]. You will have a Schedule Now button in [employer] to help with scheduling this appointment. Otherwise, you can call [phone] to schedule an appointment.     You will not be charged for this eVisit. Thank you for trusting us with your care.     Alysha Marsh, DNP

## 2025-07-07 NOTE — TELEPHONE ENCOUNTER
"Pt returned call. Reported that it is not something urgent where she needs to go to urgent care for visit. Pt reported the following:    \"Random low back pain that is not frequent...sharp and sudden, but not constant.\" Pain has not bother her for 3 weeks now.  Right now pain is \"low dull pain.\"   Drink a fair amount of water  Mild pain 1/10 at the moment,  pain does not last long. It comes then quiet down.   No fever or other symptoms a the moment. Bladder and bowel are normal (no pain), so pt wanted to get some tests done to rule out kidney stones.     Went over red flag symptoms with pt and advised urgent care or call back if symptoms worsen. Pt verbalized understanding and said she is ok to wait till next week. Scheduled visit with pcp on 7/14/25.    Patricio Ennis, BSN, PHN, RN-[facility]       "

## 2025-07-14 ENCOUNTER — ANCILLARY PROCEDURE (OUTPATIENT)
Dept: GENERAL RADIOLOGY | Facility: CLINIC | Age: 55
End: 2025-07-14
Attending: NURSE PRACTITIONER
Payer: COMMERCIAL

## 2025-07-14 ENCOUNTER — OFFICE VISIT (OUTPATIENT)
Dept: FAMILY MEDICINE | Facility: CLINIC | Age: 55
End: 2025-07-14
Payer: COMMERCIAL

## 2025-07-14 VITALS
RESPIRATION RATE: 17 BRPM | HEART RATE: 69 BPM | SYSTOLIC BLOOD PRESSURE: 102 MMHG | TEMPERATURE: 97.3 F | OXYGEN SATURATION: 97 % | DIASTOLIC BLOOD PRESSURE: 74 MMHG | HEIGHT: 65 IN | WEIGHT: 165.2 LBS | BODY MASS INDEX: 27.52 KG/M2

## 2025-07-14 DIAGNOSIS — R31.29 MICROSCOPIC HEMATURIA: ICD-10-CM

## 2025-07-14 DIAGNOSIS — M54.50 ACUTE BILATERAL LOW BACK PAIN WITHOUT SCIATICA: Primary | ICD-10-CM

## 2025-07-14 DIAGNOSIS — M54.50 ACUTE BILATERAL LOW BACK PAIN WITHOUT SCIATICA: ICD-10-CM

## 2025-07-14 DIAGNOSIS — J34.89 NASAL OBSTRUCTION: ICD-10-CM

## 2025-07-14 LAB
ALBUMIN UR-MCNC: NEGATIVE MG/DL
APPEARANCE UR: CLEAR
BACTERIA #/AREA URNS HPF: ABNORMAL /HPF
BILIRUB UR QL STRIP: NEGATIVE
COLOR UR AUTO: YELLOW
GLUCOSE UR STRIP-MCNC: NEGATIVE MG/DL
HGB UR QL STRIP: ABNORMAL
KETONES UR STRIP-MCNC: NEGATIVE MG/DL
LEUKOCYTE ESTERASE UR QL STRIP: NEGATIVE
NITRATE UR QL: NEGATIVE
PH UR STRIP: 7 [PH] (ref 5–7)
RBC #/AREA URNS AUTO: ABNORMAL /HPF
SP GR UR STRIP: 1.02 (ref 1–1.03)
UROBILINOGEN UR STRIP-ACNC: 0.2 E.U./DL
WBC #/AREA URNS AUTO: ABNORMAL /HPF

## 2025-07-14 PROCEDURE — 99214 OFFICE O/P EST MOD 30 MIN: CPT | Performed by: NURSE PRACTITIONER

## 2025-07-14 PROCEDURE — 3078F DIAST BP <80 MM HG: CPT | Performed by: NURSE PRACTITIONER

## 2025-07-14 PROCEDURE — 74018 RADEX ABDOMEN 1 VIEW: CPT | Mod: TC | Performed by: RADIOLOGY

## 2025-07-14 PROCEDURE — 3074F SYST BP LT 130 MM HG: CPT | Performed by: NURSE PRACTITIONER

## 2025-07-14 PROCEDURE — 1125F AMNT PAIN NOTED PAIN PRSNT: CPT | Performed by: NURSE PRACTITIONER

## 2025-07-14 PROCEDURE — G2211 COMPLEX E/M VISIT ADD ON: HCPCS | Performed by: NURSE PRACTITIONER

## 2025-07-14 PROCEDURE — 81001 URINALYSIS AUTO W/SCOPE: CPT | Performed by: NURSE PRACTITIONER

## 2025-07-14 ASSESSMENT — PAIN SCALES - GENERAL: PAINLEVEL_OUTOF10: MILD PAIN (2)

## 2025-07-14 NOTE — PROGRESS NOTES
"  Assessment & Plan     Acute bilateral low back pain without sciatica  Symptoms could be consistent with non-obstructing kidney stone.  UA also with trace hematuria, negative for infection.  Will order KUB and additional imaging as needed.  Placing referral to urology due to longstanding history of microscopic hematuria, we can expedite this referral pending imaging results.  - UA Macroscopic with reflex to Microscopic and Culture - Lab Collect; Future  - UA Macroscopic with reflex to Microscopic and Culture - Lab Collect  - UA Microscopic with Reflex to Culture  - XR KUB; Future    Microscopic hematuria  See above.  Ongoing for many years and persistent despite treating atrophic vaginitis.  Referring to urology for further work up if indicated.  - Adult Urology  Referral; Future  - XR KUB; Future    Nasal obstruction  Continues to have symptoms, possibly nasal polyp.  Continue Flonase and nasal lavage.  Will refer to ENT for further investigation.    - Adult ENT  Referral; Future      The longitudinal plan of care for the diagnosis(es)/condition(s) as documented were addressed during this visit. Due to the added complexity in care, I will continue to support Radha in the subsequent management and with ongoing continuity of care.    BMI  Estimated body mass index is 27.49 kg/m  as calculated from the following:    Height as of this encounter: 1.651 m (5' 5\").    Weight as of this encounter: 74.9 kg (165 lb 3.2 oz).           Ovi Garcia is a 55 year old, presenting for the following health issues:  Office Visit (Pt states that she has a sore in her nose. Pt states she has kidney pain since last fall.)      7/14/2025    10:54 AM   Additional Questions   Roomed by Maria Elena BRIGGS CMA   Accompanied by Self         7/14/2025    10:54 AM   Patient Reported Additional Medications   Patient reports taking the following new medications No     History of Present Illness       Reason for visit:  Concern " regarding possiblekidney stones  Symptom onset:  More than a month  Symptoms include:  Rare intense pain in the areas of my kidneys, ongoing dull pain in similar areas  Symptom intensity:  Moderate  Symptom progression:  Staying the same  Had these symptoms before:  No  What makes it worse:  No  What makes it better:  No   She is taking medications regularly.        Dull pain on the right side, comes and goes.  Gone quickly.      Happening since last fall.  Sometimes on the left side.      Last fall did a 24 hour urine collection.  A lot of calcium- but was taking a lot of supplements.  Does drink a lot of water.  Not dark, but also not clear.  Massage can be uncomfortble.    Breathing constricted- feels swollen in the nose, felt like a blister.    Did Flonase, sinus rinse.  Has a congestion feeling.  But not that.  Feels like a raisin.                Objective    There were no vitals taken for this visit.  There is no height or weight on file to calculate BMI.  Physical Exam   GENERAL: alert and no distress  ABDOMEN: soft, nontender, no hepatosplenomegaly, no masses and bowel sounds normal  MS: no gross musculoskeletal defects noted, no edema  BACK: no CVA tenderness, no paralumbar tenderness            Signed Electronically by: Alysha Marsh DNP

## 2025-07-15 ENCOUNTER — PATIENT OUTREACH (OUTPATIENT)
Dept: CARE COORDINATION | Facility: CLINIC | Age: 55
End: 2025-07-15
Payer: COMMERCIAL

## 2025-07-17 ENCOUNTER — RESULTS FOLLOW-UP (OUTPATIENT)
Dept: FAMILY MEDICINE | Facility: CLINIC | Age: 55
End: 2025-07-17
Payer: COMMERCIAL

## 2025-07-17 ENCOUNTER — PATIENT OUTREACH (OUTPATIENT)
Dept: CARE COORDINATION | Facility: CLINIC | Age: 55
End: 2025-07-17
Payer: COMMERCIAL

## 2025-07-17 DIAGNOSIS — M54.50 ACUTE BILATERAL LOW BACK PAIN WITHOUT SCIATICA: ICD-10-CM

## 2025-07-17 DIAGNOSIS — R31.29 MICROSCOPIC HEMATURIA: Primary | ICD-10-CM

## 2025-07-21 NOTE — TELEPHONE ENCOUNTER
Writer relayed PCP's message to pt via [employer].    Patricio Ennis, BSN, PHN, RN-[facility]        no

## 2025-07-21 NOTE — TELEPHONE ENCOUNTER
Reason for Visit: J34.89 (ICD-10-CM) - Nasal obstruction, referral from Alysha Marsh, referral notes in [facility] pt made appt for [facility] location    Date of Appointment: 10/20/2025   Notes Status Description   Office Notes from Referring Provider [facility] FP/IM   7/14/25 OV: Alysha Marsh, DNP    Imaging     CT [facility]/ PACS [facility]  6/20/16 CT neck

## 2025-08-01 ENCOUNTER — ANCILLARY PROCEDURE (OUTPATIENT)
Dept: CT IMAGING | Facility: CLINIC | Age: 55
End: 2025-08-01
Attending: NURSE PRACTITIONER
Payer: COMMERCIAL

## 2025-08-01 DIAGNOSIS — M54.50 ACUTE BILATERAL LOW BACK PAIN WITHOUT SCIATICA: ICD-10-CM

## 2025-08-01 DIAGNOSIS — R31.29 MICROSCOPIC HEMATURIA: ICD-10-CM

## 2025-08-01 PROCEDURE — 74176 CT ABD & PELVIS W/O CONTRAST: CPT

## 2025-08-07 ENCOUNTER — OFFICE VISIT (OUTPATIENT)
Dept: UROLOGY | Facility: CLINIC | Age: 55
End: 2025-08-07
Payer: COMMERCIAL

## 2025-08-07 VITALS
SYSTOLIC BLOOD PRESSURE: 108 MMHG | OXYGEN SATURATION: 100 % | WEIGHT: 164.4 LBS | HEART RATE: 67 BPM | DIASTOLIC BLOOD PRESSURE: 67 MMHG | BODY MASS INDEX: 27.36 KG/M2

## 2025-08-07 DIAGNOSIS — N95.1 POST MENOPAUSAL SYNDROME: ICD-10-CM

## 2025-08-07 DIAGNOSIS — R31.29 MICROSCOPIC HEMATURIA: Primary | ICD-10-CM

## 2025-08-20 ENCOUNTER — TELEPHONE (OUTPATIENT)
Dept: OTOLARYNGOLOGY | Facility: CLINIC | Age: 55
End: 2025-08-20
Payer: COMMERCIAL

## 2025-10-20 ENCOUNTER — PRE VISIT (OUTPATIENT)
Dept: OTOLARYNGOLOGY | Facility: CLINIC | Age: 55
End: 2025-10-20

## (undated) DEVICE — SU PDS II 0 ENDOLOOP EZ10G

## (undated) DEVICE — GLOVE BIOGEL PI MICRO SZ 6.0 48560

## (undated) DEVICE — SOL WATER IRRIG 1000ML BOTTLE 2F7114

## (undated) DEVICE — ENDO TROCAR FIRST ENTRY KII FIOS Z-THRD 05X100MM CTF03

## (undated) DEVICE — ESU HOLDER LAP INST DISP PURPLE LONG 330MM H-PRO-330

## (undated) DEVICE — SU VICRYL 0 UR-6 27" J603H

## (undated) DEVICE — LINEN TOWEL PACK X5 5464

## (undated) DEVICE — ENDO TROCAR FIRST ENTRY KII FIOS Z-THRD 12X100MM CTF73

## (undated) DEVICE — SOL NACL 0.9% INJ 1000ML BAG 2B1324X

## (undated) DEVICE — PACK LAP CHOLE SLC15LCFSD

## (undated) DEVICE — GOWN IMPERVIOUS BREATHABLE SMART LG 89015

## (undated) DEVICE — EVAC SYSTEM CLEAR FLOW SC082500

## (undated) DEVICE — SU VICRYL 3-0 SH 27" J316H

## (undated) DEVICE — SU MONOCRYL 4-0 PS-2 18" UND Y496G

## (undated) DEVICE — ENDO SCOPE WARMER LF TM500

## (undated) DEVICE — GLOVE BIOGEL PI MICRO INDICATOR UNDERGLOVE SZ 6.5 48965

## (undated) DEVICE — DEVICE SUTURE GRASPER TROCAR CLOSURE 14GA PMITCSG

## (undated) DEVICE — SUCTION IRR STRYKERFLOW II W/TIP 250-070-520

## (undated) DEVICE — ENDO TROCAR SLEEVE KII Z-THREADED 05X100MM CTS02

## (undated) DEVICE — CLIP APPLIER ENDO 5MM M/L LIGAMAX EL5ML

## (undated) DEVICE — ENDO POUCH UNIV RETRIEVAL SYSTEM INZII 10MM CD001

## (undated) DEVICE — PREP CHLORAPREP 26ML TINTED HI-LITE ORANGE 930815

## (undated) DEVICE — ESU GROUND PAD UNIVERSAL W/O CORD

## (undated) DEVICE — DECANTER BAG 2002S

## (undated) RX ORDER — OXYCODONE HYDROCHLORIDE 5 MG/1
TABLET ORAL
Status: DISPENSED
Start: 2023-11-01

## (undated) RX ORDER — DEXAMETHASONE SODIUM PHOSPHATE 4 MG/ML
INJECTION, SOLUTION INTRA-ARTICULAR; INTRALESIONAL; INTRAMUSCULAR; INTRAVENOUS; SOFT TISSUE
Status: DISPENSED
Start: 2023-11-01

## (undated) RX ORDER — FENTANYL CITRATE 0.05 MG/ML
INJECTION, SOLUTION INTRAMUSCULAR; INTRAVENOUS
Status: DISPENSED
Start: 2023-11-01

## (undated) RX ORDER — HYDROMORPHONE HYDROCHLORIDE 1 MG/ML
INJECTION, SOLUTION INTRAMUSCULAR; INTRAVENOUS; SUBCUTANEOUS
Status: DISPENSED
Start: 2023-11-01

## (undated) RX ORDER — ONDANSETRON 2 MG/ML
INJECTION INTRAMUSCULAR; INTRAVENOUS
Status: DISPENSED
Start: 2023-11-01

## (undated) RX ORDER — KETOROLAC TROMETHAMINE 30 MG/ML
INJECTION, SOLUTION INTRAMUSCULAR; INTRAVENOUS
Status: DISPENSED
Start: 2023-11-01

## (undated) RX ORDER — FENTANYL CITRATE 50 UG/ML
INJECTION, SOLUTION INTRAMUSCULAR; INTRAVENOUS
Status: DISPENSED
Start: 2023-11-01

## (undated) RX ORDER — PROPOFOL 10 MG/ML
INJECTION, EMULSION INTRAVENOUS
Status: DISPENSED
Start: 2023-11-01